# Patient Record
Sex: FEMALE | Race: WHITE | NOT HISPANIC OR LATINO | Employment: OTHER | URBAN - METROPOLITAN AREA
[De-identification: names, ages, dates, MRNs, and addresses within clinical notes are randomized per-mention and may not be internally consistent; named-entity substitution may affect disease eponyms.]

---

## 2017-01-25 ENCOUNTER — OFFICE VISIT (OUTPATIENT)
Dept: NEPHROLOGY | Facility: MEDICAL CENTER | Age: 62
End: 2017-01-25
Payer: COMMERCIAL

## 2017-01-25 VITALS
WEIGHT: 219 LBS | TEMPERATURE: 98.4 F | RESPIRATION RATE: 14 BRPM | DIASTOLIC BLOOD PRESSURE: 80 MMHG | HEART RATE: 84 BPM | SYSTOLIC BLOOD PRESSURE: 126 MMHG | HEIGHT: 66 IN | BODY MASS INDEX: 35.2 KG/M2

## 2017-01-25 DIAGNOSIS — I10 ESSENTIAL HYPERTENSION: ICD-10-CM

## 2017-01-25 DIAGNOSIS — N28.1 RENAL CYST: ICD-10-CM

## 2017-01-25 DIAGNOSIS — N18.1 CKD (CHRONIC KIDNEY DISEASE), STAGE I: ICD-10-CM

## 2017-01-25 PROCEDURE — 99213 OFFICE O/P EST LOW 20 MIN: CPT | Performed by: INTERNAL MEDICINE

## 2017-01-25 NOTE — PROGRESS NOTES
"Subjective:      Jodi Lopez is a 61 y.o. female who presents with Follow-Up and Chronic Kidney Disease       Jodi is coming today for f/u of right kidney cyst  Last time seen here 3 years ago.  No complaints  No dysuria/hematuria.  BP well controlled.  Serum creatinine level stable -WNL  Review of Systems   Constitutional: Negative for fever, chills, weight loss and malaise/fatigue.   HENT: Negative for congestion, sore throat and neck pain.    Eyes: Negative for blurred vision, double vision and pain.   Respiratory: Negative for cough, sputum production and wheezing.    Cardiovascular: Negative for chest pain, palpitations and leg swelling.   Gastrointestinal: Negative for nausea, vomiting, abdominal pain and diarrhea.   Genitourinary: Positive for frequency. Negative for dysuria, urgency and hematuria.   Musculoskeletal: Positive for back pain. Negative for myalgias.   Neurological: Negative for dizziness, sensory change, focal weakness and headaches.          Objective:     /80 mmHg  Pulse 84  Temp(Src) 36.9 °C (98.4 °F) (Temporal)  Resp 14  Ht 1.676 m (5' 6\")  Wt 99.338 kg (219 lb)  BMI 35.36 kg/m2     Physical Exam   Constitutional: She is oriented to person, place, and time. She appears well-developed and well-nourished. No distress.   HENT:   Head: Normocephalic and atraumatic.   Nose: Nose normal.   Mouth/Throat: Oropharynx is clear and moist.   Eyes: Conjunctivae normal and EOM are normal. Pupils are equal, round, and reactive to light. No scleral icterus.   Neck: Normal range of motion. Neck supple. No thyromegaly present.   Cardiovascular: Normal rate and regular rhythm.  Exam reveals no gallop and no friction rub.    Pulmonary/Chest: No respiratory distress. She has no wheezes. She has no rales.   Abdominal: Soft. Bowel sounds are normal. She exhibits no distension. There is no tenderness.   Musculoskeletal: Normal range of motion. She exhibits no edema and no tenderness. "   Lymphadenopathy:     She has no cervical adenopathy.   Neurological: She is alert and oriented to person, place, and time. No cranial nerve deficit.   Skin: Skin is warm. No rash noted. She is not diaphoretic. No erythema.   Renal US results reviewed , d/w patient.  Lab Results   Component Value Date/Time    CREATININE 0.82 11/27/2012 12:00 AM    POTASSIUM 3.9 11/27/2012 12:00 AM       Lab Results   Component Value Date/Time    CREATININE 0.67 08/03/2016 02:43 PM    POTASSIUM 3.7 08/03/2016 02:43 PM                 Assessment:     1.CKD I -stable  2.HTN: BP well controlled Pt advised to monitor BP at home  3.Electrolytes: well controlled.  4.Anemia: Hb stable WNL  5.Right renal complex cyst -slightly bigger in size per US      .    Plan:     See medications and orders placed in encounter report.  To monitor renal cyst , f/u in Urology Clinic

## 2017-01-25 NOTE — MR AVS SNAPSHOT
"        Jodi Lopez   2017 1:15 PM   Office Visit   MRN: [id number]    Department:  [facility]   Dept Phone:  [phone]    Description:  Female : 1955   Provider:  Jaci Vazquez M.D.           Reason for Visit     Follow-Up           Allergies as of 2017     Allergen Noted Reactions    Ammonia 2016       woozy     Erythromycin 2012   Rash    Food 2016   Shortness of Breath    Blue cheese feel weird  Jalapeno    Lipitor [Atorvastatin Calcium] 2013   Rash    Lisinopril 2015   Rash    rash    Other Misc 2016   Rash    OTC herbal \"Thuja\"    Penicillins 2012       Tongue swelling    Tape 2016   Rash, Itching    Rash at site (usually sensitive areas of skin)  Tape, EKG pads    Vancomycin 2012       Unsure of reaction      Vital Signs     Blood Pressure Pulse Temperature Respirations Height Weight    126/80 mmHg 84 36.9 °C (98.4 °F) (Temporal) 14 1.676 m (5' 6\") 99.338 kg (219 lb)    Body Mass Index Smoking Status                35.36 kg/m2 Passive Smoke Exposure - Never Smoker          Basic Information     Date Of Birth Sex Race Ethnicity Preferred Language    1955 Female White Non- English      Problem List              ICD-10-CM Priority Class Noted - Resolved    Left knee injury S89.92XA   2012 - Present    Internal derangement of knee    2012 - Present    Renal cyst N28.1   2013 - Present    CKD (chronic kidney disease), stage I N18.1   2013 - Present    HTN (hypertension) I10   2013 - Present    Varicose vein I86.8   2015 - Present    Hemorrhoids K64.9   2016 - Present      Health Maintenance        Date Due Completion Dates    IMM DTaP/Tdap/Td Vaccine (1 - Tdap) 1973 ---    PAP SMEAR 1975 ---    COLONOSCOPY 2004 ---    MAMMOGRAM 3/6/2007 3/6/2006, 3/1/2005    IMM ZOSTER VACCINE 2014 ---    IMM INFLUENZA (1) 2016 ---            Current Immunizations     No " "immunizations on file.      Below and/or attached are the medications your provider expects you to take. Review all of your home medications and newly ordered medications with your provider and/or pharmacist. Follow medication instructions as directed by your provider and/or pharmacist. Please keep your medication list with you and share with your provider. Update the information when medications are discontinued, doses are changed, or new medications (including over-the-counter products) are added; and carry medication information at all times in the event of emergency situations     Allergies:  AMMONIA - (reactions not documented)     ERYTHROMYCIN - Rash     FOOD - Shortness of Breath     LIPITOR - Rash     LISINOPRIL - Rash     OTHER MISC - Rash     PENICILLINS - (reactions not documented)     TAPE - Rash,Itching     VANCOMYCIN - (reactions not documented)               Medications  Valid as of: January 25, 2017 -  1:22 PM    Generic Name Brand Name Tablet Size Instructions for use    Aspirin (Tab) aspirin 81 MG Take 81 mg by mouth 1 time daily as needed.        Biotin (Cap) Biotin 5000 MCG Take 1 Cap by mouth every day.        Calcium-Magnesium-Vitamin D   Take 1 Tab by mouth as needed.        Cholecalciferol (Tab) cholecalciferol 1000 UNIT Take 1,000-2,000 Units by mouth every day.        Diclofenac Sodium (Gel) Diclofenac Sodium 1 % Apply 1 Application to skin as directed as needed.        Glucosamine HCl-MSM   Take 1 Tab by mouth as needed.        Magnesium (Tab) Magnesium 400 MG Take 1 Tab by mouth as needed.        Multiple Vitamin   Take 1 Tab by mouth every day.        NON SPECIFIED   Take 1 Tab by mouth every day. OTC  \"Nutri-Calm\"        Turmeric   Take 1 Tab by mouth every day.        .                 Medicines prescribed today were sent to:     [id number] - [place] - [address] [address]    Phone: [phone] Fax: [phone]    Open 24 Hours?: No      Medication " refill instructions:       If your prescription bottle indicates you have medication refills left, it is not necessary to call your provider’s office. Please contact your pharmacy and they will refill your medication.    If your prescription bottle indicates you do not have any refills left, you may request refills at any time through one of the following ways: The online [employer] system (except Urgent Care), by calling your provider’s office, or by asking your pharmacy to contact your provider’s office with a refill request. Medication refills are processed only during regular business hours and may not be available until the next business day. Your provider may request additional information or to have a follow-up visit with you prior to refilling your medication.   *Please Note: Medication refills are assigned a new Rx number when refilled electronically. Your pharmacy may indicate that no refills were authorized even though a new prescription for the same medication is available at the pharmacy. Please request the medicine by name with the pharmacy before contacting your provider for a refill.           Smarter Remarketer Access Code: Activation code not generated  Current Smarter Remarketer Status: Active

## 2017-03-23 ENCOUNTER — OCCUPATIONAL MEDICINE (OUTPATIENT)
Dept: URGENT CARE | Facility: CLINIC | Age: 62
End: 2017-03-23
Payer: COMMERCIAL

## 2017-03-23 VITALS
HEART RATE: 71 BPM | WEIGHT: 219 LBS | SYSTOLIC BLOOD PRESSURE: 160 MMHG | TEMPERATURE: 98.6 F | BODY MASS INDEX: 35.2 KG/M2 | HEIGHT: 66 IN | OXYGEN SATURATION: 99 % | RESPIRATION RATE: 16 BRPM | DIASTOLIC BLOOD PRESSURE: 80 MMHG

## 2017-03-23 DIAGNOSIS — S46.211A BICEPS STRAIN, RIGHT, INITIAL ENCOUNTER: ICD-10-CM

## 2017-03-23 PROCEDURE — 99214 OFFICE O/P EST MOD 30 MIN: CPT | Performed by: NURSE PRACTITIONER

## 2017-03-23 RX ORDER — NAPROXEN 500 MG/1
500 TABLET ORAL 2 TIMES DAILY WITH MEALS
Qty: 10 TAB | Refills: 0 | Status: SHIPPED | OUTPATIENT
Start: 2017-03-23 | End: 2017-03-27

## 2017-03-23 NOTE — Clinical Note
"EMPLOYEE’S CLAIM FOR COMPENSATION/ REPORT OF INITIAL TREATMENT  FORM C-4    EMPLOYEE’S CLAIM - PROVIDE ALL INFORMATION REQUESTED   First Name  Jodi Last Name  John Birthdate                    1955                Sex  female Claim Number   Home Address  [address] Age  61 y.o. Height  1.676 m (5' 5.98\") Weight  99.338 kg (219 lb) [facility]     [place] Zip  [address] Telephone  [phone] (home)    Mailing Address  [address] [place] Zip  [address] Primary Language Spoken  English    Insurer   Third Party   Kiki   Employee's Occupation (Job Title) When Injury or Occupational Disease Occurred       Employer's Name    [employer] Telephone      Employer Address   [address] [place] Zip   [address]   Date of Injury  2/23/2017               Hour of Injury  9:00 AM Date Employer Notified  2/23/2017 Last Day of Work after Injury or Occupational Disease  3/23/2017 Supervisor to Whom Injury Reported  Jodi Lopez    Address or Location of Accident (if applicable)  [[address] [place] [address] ]   What were you doing at the time of accident? (if applicable)  Walking across driveway     How did this injury or occupational disease occur? (Be specific an answer in detail. Use additional sheet if necessary)  Slipped on ice as I was walking from my car to the house    If you believe that you have an occupational disease, when did you first have knowledge of the disability and it relationship to your employment?  NA Witnesses to the Accident  Jayjay Villatoro      Nature of Injury or Occupational Disease  Sprain  Part(s) of Body Injured or Affected  Lower Arm (L), Upper Arm (L), N/A    I certify that the above is true and correct to the best of my knowledge and that I have provided this information in order to obtain the benefits of [place] " [place]).  I hereby authorize any physician, chiropractor, surgeon, practitioner, or other person, any hospital, including [facility] or [facility] hospital, any medical service organization, any insurance company, or other institution or organization to release to each other, any medical or other information, including benefits paid or payable, pertinent to this injury or disease, except information relative to diagnosis, treatment and/or counseling for AIDS, psychological conditions, alcohol or controlled substances, for which I must give specific authorization.  A Photostat of this authorization shall be as valid as the original.     Date   Place   Employee’s Signature   THIS REPORT MUST BE COMPLETED AND MAILED WITHIN 3 WORKING DAYS OF TREATMENT   Place  [facility]  Name of [facility]   Date  3/23/2017 Diagnosis  No diagnosis found. Is there evidence the injured employee was under the influence of alcohol and/or another controlled substance at the time of accident?   Hour  2:38 PM Description of Injury or Disease  There were no encounter diagnoses. No   Treatment  Naprosyn as prescribed prn pain.    Have you advised the patient to remain off work five days or more? No   X-Ray Findings      If Yes   From Date  To Date      From information given by the employee, together with medical evidence, can you directly connect this injury or occupational disease as job incurred?    Comments:?  While fall did not occur while performing job duty, she was carrying supplies in to work, which may have impeded her ability to see ice hazard or maintain balance.   If No Full Duty  No Modified Duty      Is additional medical care by a physician indicated?  Yes  Comments:Follow up in 5 days. If Modified Duty, Specify any Limitations / Restrictions  See work restrictions per D-39: limit pushing/pulling/lifting and  "carrying to 10 pounds; limit reaching above the right shoulder to less than 1 hour per day.  Restrictions apply to right arm only.   Do you know of any previous injury or disease contributing to this condition or occupational disease?                            No   Date  3/23/2017 Print Doctor’s Name ROGERIO Park certify the employer’s copy of  this form was mailed on:   Address  [address] Insurer’s Use Only     [place] Zip  [address]    Provider’s Tax ID Number  [id number]  Telephone  Dept: [phone]        clyde-MILLIE [place]   e-Signature: Dr. Juvenal Johnson, Medical Director Degree  APRN        ORIGINAL-TREATING PHYSICIAN OR CHIROPRACTOR    PAGE 2-INSURER/TPA    PAGE 3-EMPLOYER    PAGE 4-EMPLOYEE             Form C-4 (rev10/07)              BRIEF DESCRIPTION OF RIGHTS AND BENEFITS  (Pursuant to [place]RS 616C.050)    Notice of Injury or Occupational Disease (Incident Report Form C-1): If an injury or occupational disease (OD) arises out of and in the  course of employment, you must provide written notice to your employer as soon as practicable, but no later than 7 days after the accident or  OD. Your employer shall maintain a sufficient supply of the required forms.    Claim for Compensation (Form C-4): If medical treatment is sought, the form C-4 is available at the place of initial treatment. A completed  \"Claim for Compensation\" (Form C-4) must be filed within 90 days after an accident or OD. The treating physician or chiropractor must,  within 3 working days after treatment, complete and mail to the employer, the employer's insurer and third-party , the Claim for  Compensation.    Medical Treatment: If you require medical treatment for your on-the-job injury or OD, you may be required to select a physician or  chiropractor from a list provided by your workers’ compensation insurer, if it has contracted with an Organization for Managed Care (O) " or  Preferred Provider Organization (PPO) or providers of health care. If your employer has not entered into a contract with an MCO or PPO, you  may select a physician or chiropractor from the Panel of Physicians and Chiropractors. Any medical costs related to your industrial injury or  OD will be paid by your insurer.    Temporary Total Disability (TTD): If your doctor has certified that you are unable to work for a period of at least 5 consecutive days, or 5  cumulative days in a 20-day period, or places restrictions on you that your employer does not accommodate, you may be entitled to TTD  compensation.    Temporary Partial Disability (TPD): If the wage you receive upon reemployment is less than the compensation for TTD to which you are  entitled, the insurer may be required to pay you TPD compensation to make up the difference. TPD can only be paid for a maximum of 24  months.    Permanent Partial Disability (PPD): When your medical condition is stable and there is an indication of a PPD as a result of your injury or  OD, within 30 days, your insurer must arrange for an evaluation by a rating physician or chiropractor to determine the degree of your PPD. The  amount of your PPD award depends on the date of injury, the results of the PPD evaluation and your age and wage.    Permanent Total Disability (PTD): If you are medically certified by a treating physician or chiropractor as permanently and totally disabled  and have been granted a PTD status by your insurer, you are entitled to receive monthly benefits not to exceed 66 2/3% of your average  monthly wage. The amount of your PTD payments is subject to reduction if you previously received a PPD award.    Vocational Rehabilitation Services: You may be eligible for vocational rehabilitation services if you are unable to return to the job due to a  permanent physical impairment or permanent restrictions as a result of your injury or occupational  disease.    Transportation and Per Jewels Reimbursement: You may be eligible for travel expenses and per jewels associated with medical treatment.    Reopening: You may be able to reopen your claim if your condition worsens after claim closure.    Appeal Process: If you disagree with a written determination issued by the insurer or the insurer does not respond to your request, you may  appeal to the Department of Administration, , by following the instructions contained in your determination letter. You must  appeal the determination within 70 days from the date of the determination letter at [address], [address], [place], [place]  [address], or [address], [address], [place], [place] [address]. If you disagree with the  decision, you may appeal to the  Department of Administration, . You must file your appeal within 30 days from the date of the  decision  letter at [address], [address], [place], [place] [address], or [address], [place], [place] [address]. If you  disagree with a decision of an , you may file a petition for judicial review with the District Court. You must do so within 30  days of the Appeal Officer’s decision. You may be represented by an  at your own expense or you may contact the [facility] for possible  representation.    [place]  for Injured Workers (NAIW): If you disagree with a  decision, you may request that NAIW represent you  without charge at an  Hearing. For information regarding denial of benefits, you may contact the [facility] at: [address], [address], [place] [address], [phone], or [address] [facility] [address], [place] [address], [phone]    To File a Complaint with the Division: If you wish to file a complaint with the  of the Division of Industrial Relations (DIR),  please contact  the Workers’ Compensation Section, [address], [address], [place], [place] [address], telephone ([phone]) [phone], or  [address], [address], [place], [place] [address], telephone [phone]) [phone].    For assistance with Workers’ Compensation Issues: you may contact the Office of the [facility] Consumer Health Assistance, [address] [place] [address], [address], [place], [place] [address], Toll Free 1-210.548.6788, Web site: http://govcha.[facility].nv., E-mail  Trina@[facility].nv.                                                                                                                                                                                                                                   __________________________________________________________________                                                                   _________________                Employee Name / Signature                                                                                                                                                       Date                                                                                                                                                                                                     D-2 (rev. 10/07)

## 2017-03-23 NOTE — Clinical Note
[facility] [place] [id number] [address]  Phone: [phone] - Fax: [phone]        [facility] Progress Report and Disability Certifica @ 1:20PMtion  Date of Service: 3/23/2017   No Show:  No  Date / Time of Next Visit: 3/27/2017   Claim Information   Patient Name: Jodi Lopez  Claim Number:     Employer:   [employer] Date of Injury: 2/23/2017     Insurer / TPA: Kiki  ID / SSN:     Occupation:    Diagnosis: There were no encounter diagnoses.    Medical Information   Related to Industrial Injury?   Comments:Not injured while performing job duty, but carrying of work supplies may have impeded patient's ability to visualize ice risk or keep her balance as she slipped.    Subjective Complaints:  DOI 2/23/17  Patient works as a .  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  She fell to the side, sustaining a blow to the right elbow and upper arm.  She noted pain at the time of fall.  While she has had improvement in pain, she reports a persistent aching pain in the right biceps region, worse with movement.  She has been taking NSAIDs at OTC dose intermittently with minimal relief.  She also had some left over Voltaren gel that she has applied with minimal relief.  She denies any numbness, tingling, or weakness.  She denies any prior injury.  She denies any second job or recreational activity as contributing factor.     Objective Findings: Patient is alert, oriented, and in no acute distress.  Heart RRR, S1, S2.  Lungs clear to auscultation.  Right arm is warm, dry, and intact with no bruising, swelling, erythema, rash or lesion.  No shoulder joint pain on palpation.  No clavicle or scapula pain.  No palpable muscle spasms.  Right Biceps is tender to palpation.  No elbow joint tenderness.  No forearm, wrist, or hand tenderness.   Shoulder ROM intact.  Elbow ROM intact but certain movements increase biceps pain.  Pronation/supination against opposition aggravates biceps pain.  Radial pulse brisk.  Cap refill < 2 seconds.   Pre-Existing Condition(s):     Assessment:   Initial Visit    Status: Additional Care Required  Comments:Follow up in 5 days.  Permanent Disability:     Plan: Medication  Comments:Naprosyn as prescribed.    Diagnostics:      Comments:       Disability Information   Status: Released to Restricted Duty    From:  3/23/2017  Through: 3/27/2017 Restrictions are: Temporary   Physical Restrictions   Sitting:    Standing:    Stooping:    Bending:      Squatting:    Walking:    Climbing:    Pushing:    Comments:Less than 10 pounds   Pulling:    Comments:Less than 10 pounds Other:    Reaching Above Shoulder (L):   Reaching Above Shoulder (R): < or = 1 hrs/day     Reaching Below Shoulder (L):    Reaching Below Shoulder (R):      Not to exceed Weight Limits   Carrying(hrs):   Weight Limit(lb): < or = to 10 pounds Lifting(hrs):   Weight  Limit(lb): < or = to 10 pounds   Comments: Restrictions apply to right arm only.    Repetitive Actions   Hands: i.e. Fine Manipulations from Grasping:     Feet: i.e. Operating Foot Controls:     Driving / Operate Machinery:     Physician Name: ROGERIO Park Physician Signature: MILLIE Daly e-Signature: Dr. Juvenal Johnson, Medical Director   Clinic Name / Location: [address] [address] Clinic Phone Number: Dept: [phone]   Appointment Time: 2:00 Pm Visit Start Time: 2:38 PM   Check-In Time:  2:20 Pm Visit Discharge Time:  3:22 PM   Original-Treating Physician or Chiropractor    Page 2-Insurer/TPA    Page 3-Employer    Page 4-Employee

## 2017-03-23 NOTE — MR AVS SNAPSHOT
"        Jodi Jonesrington   3/23/2017 2:00 PM   Occupational Medicine   MRN: [id number]    Department:  [facility]   Dept Phone:  [phone]    Description:  Female : 1955   Provider:  ROGERIO Park           Reason for Visit     Arm Pain right forearm pain s/p GLF x 1 month ago      Allergies as of 3/23/2017     Allergen Noted Reactions    Ammonia 2016       woozy     Erythromycin 2012   Rash    Food 2016   Shortness of Breath    Blue cheese feel weird  Jalapeno    Lipitor [Atorvastatin Calcium] 2013   Rash    Lisinopril 2015   Rash    rash    Other Misc 2016   Rash    OTC herbal \"Thuja\"    Penicillins 2012       Tongue swelling    Tape 2016   Rash, Itching    Rash at site (usually sensitive areas of skin)  Tape, EKG pads    Vancomycin 2012       Unsure of reaction      Vital Signs     Blood Pressure Pulse Temperature Respirations Height Weight    160/80 mmHg 71 37 °C (98.6 °F) 16 1.676 m (5' 5.98\") 99.338 kg (219 lb)    Body Mass Index Oxygen Saturation Breastfeeding? Smoking Status          35.36 kg/m2 99% No Passive Smoke Exposure - Never Smoker        Basic Information     Date Of Birth Sex Race Ethnicity Preferred Language    1955 Female White Non- English      Problem List              ICD-10-CM Priority Class Noted - Resolved    Left knee injury S89.92XA   2012 - Present    Internal derangement of knee    2012 - Present    Renal cyst N28.1   2013 - Present    CKD (chronic kidney disease), stage I N18.1   2013 - Present    HTN (hypertension) I10   2013 - Present    Varicose vein I86.8   2015 - Present    Hemorrhoids K64.9   2016 - Present      Health Maintenance        Date Due Completion Dates    IMM DTaP/Tdap/Td Vaccine (1 - Tdap) 1973 ---    PAP SMEAR 1975 ---    COLONOSCOPY 2004 ---    MAMMOGRAM 3/6/2007 3/6/2006, 3/1/2005    IMM ZOSTER VACCINE 2014 ---   " "IMM INFLUENZA (1) 9/1/2016 ---            Current Immunizations     No immunizations on file.      Below and/or attached are the medications your provider expects you to take. Review all of your home medications and newly ordered medications with your provider and/or pharmacist. Follow medication instructions as directed by your provider and/or pharmacist. Please keep your medication list with you and share with your provider. Update the information when medications are discontinued, doses are changed, or new medications (including over-the-counter products) are added; and carry medication information at all times in the event of emergency situations     Allergies:  AMMONIA - (reactions not documented)     ERYTHROMYCIN - Rash     FOOD - Shortness of Breath     LIPITOR - Rash     LISINOPRIL - Rash     OTHER MISC - Rash     PENICILLINS - (reactions not documented)     TAPE - Rash,Itching     VANCOMYCIN - (reactions not documented)               Medications  Valid as of: March 23, 2017 -  3:25 PM    Generic Name Brand Name Tablet Size Instructions for use    Aspirin (Tab) aspirin 81 MG Take 81 mg by mouth 1 time daily as needed.        Biotin (Cap) Biotin 5000 MCG Take 1 Cap by mouth every day.        Calcium-Magnesium-Vitamin D   Take 1 Tab by mouth as needed.        Cholecalciferol (Tab) cholecalciferol 1000 UNIT Take 1,000-2,000 Units by mouth every day.        Diclofenac Sodium (Gel) Diclofenac Sodium 1 % Apply 1 Application to skin as directed as needed.        Glucosamine HCl-MSM   Take 1 Tab by mouth as needed.        Magnesium (Tab) Magnesium 400 MG Take 1 Tab by mouth as needed.        Multiple Vitamin   Take 1 Tab by mouth every day.        NON SPECIFIED   Take 1 Tab by mouth every day. OTC  \"Nutri-Calm\"        Turmeric   Take 1 Tab by mouth every day.        .                 Medicines prescribed today were sent to:     [id number] - [place], [place] - [address]    [address] [place] [address]    Phone: " [phone] Fax: [phone]    Open 24 Hours?: No      Medication refill instructions:       If your prescription bottle indicates you have medication refills left, it is not necessary to call your provider’s office. Please contact your pharmacy and they will refill your medication.    If your prescription bottle indicates you do not have any refills left, you may request refills at any time through one of the following ways: The online Gone! system (except Urgent Care), by calling your provider’s office, or by asking your pharmacy to contact your provider’s office with a refill request. Medication refills are processed only during regular business hours and may not be available until the next business day. Your provider may request additional information or to have a follow-up visit with you prior to refilling your medication.   *Please Note: Medication refills are assigned a new Rx number when refilled electronically. Your pharmacy may indicate that no refills were authorized even though a new prescription for the same medication is available at the pharmacy. Please request the medicine by name with the pharmacy before contacting your provider for a refill.           Gone! Access Code: Activation code not generated  Current Gone! Status: Active

## 2017-03-24 ASSESSMENT — ENCOUNTER SYMPTOMS
CHILLS: 0
TINGLING: 0
SENSORY CHANGE: 0
WEAKNESS: 0
FALLS: 1
DIAPHORESIS: 0
FOCAL WEAKNESS: 0
FEVER: 0

## 2017-03-24 NOTE — PROGRESS NOTES
"Subjective:      Jodi Lopez is a 61 y.o. female who presents with Arm Pain      DOI 2/23/17  Patient works as a .  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  She fell to the side, sustaining a blow to the right elbow and upper arm.  She noted pain at the time of fall.  While she has had improvement in pain, she reports a persistent aching pain in the right biceps region, worse with movement.  She has been taking NSAIDs at OTC dose intermittently with minimal relief.  She also had some left over Voltaren gel that she has applied with minimal relief.  She denies any numbness, tingling, or weakness.  She denies any prior injury.  She denies any second job or recreational activity as contributing factor.       Arm Pain   Pertinent negatives include no tingling.       Review of Systems   Constitutional: Negative for fever, chills, malaise/fatigue and diaphoresis.   Musculoskeletal: Positive for falls. Negative for joint pain.   Neurological: Negative for tingling, sensory change, focal weakness and weakness.     Medications, Allergies, and current problem list reviewed today in Epic     Objective:     /80 mmHg  Pulse 71  Temp(Src) 37 °C (98.6 °F)  Resp 16  Ht 1.676 m (5' 5.98\")  Wt 99.338 kg (219 lb)  BMI 35.36 kg/m2  SpO2 99%  Breastfeeding? No     Physical Exam    Patient is alert, oriented, and in no acute distress.  Heart RRR, S1, S2.  Lungs clear to auscultation.  Right arm is warm, dry, and intact with no bruising, swelling, erythema, rash or lesion.  No shoulder joint pain on palpation.  No clavicle or scapula pain.  No palpable muscle spasms.  Right Biceps is tender to palpation.  No elbow joint tenderness.  No forearm, wrist, or hand tenderness.  Shoulder ROM intact.  Elbow ROM intact but certain movements increase biceps pain.  Pronation/supination against opposition aggravates biceps pain.  " Radial pulse brisk.  Cap refill < 2 seconds.       Assessment/Plan:     1. Biceps strain, right, initial encounter  - naproxen (NAPROSYN) 500 MG Tab; Take 1 Tab by mouth 2 times a day, with meals for 5 days.  Dispense: 10 Tab; Refill: 0    Discussed exam findings with patient.  Naprosyn as prescribed.  Work restrictions per D-39.  Follow up in 5 days for further evaluation and care.

## 2017-03-27 ENCOUNTER — OCCUPATIONAL MEDICINE (OUTPATIENT)
Dept: OCCUPATIONAL MEDICINE | Facility: CLINIC | Age: 62
End: 2017-03-27
Payer: COMMERCIAL

## 2017-03-27 VITALS
SYSTOLIC BLOOD PRESSURE: 128 MMHG | WEIGHT: 220 LBS | BODY MASS INDEX: 35.36 KG/M2 | HEART RATE: 78 BPM | TEMPERATURE: 98.9 F | HEIGHT: 66 IN | RESPIRATION RATE: 16 BRPM | OXYGEN SATURATION: 94 % | DIASTOLIC BLOOD PRESSURE: 72 MMHG

## 2017-03-27 DIAGNOSIS — S50.01XD CONTUSION OF RIGHT ELBOW, SUBSEQUENT ENCOUNTER: ICD-10-CM

## 2017-03-27 DIAGNOSIS — S46.211D STRAIN OF RIGHT BICEPS, SUBSEQUENT ENCOUNTER: ICD-10-CM

## 2017-03-27 PROCEDURE — 99214 OFFICE O/P EST MOD 30 MIN: CPT | Performed by: PREVENTIVE MEDICINE

## 2017-03-27 RX ORDER — NAPROXEN 500 MG/1
500 TABLET ORAL 2 TIMES DAILY WITH MEALS
Qty: 60 TAB | Refills: 0 | Status: SHIPPED | OUTPATIENT
Start: 2017-03-27 | End: 2018-08-17

## 2017-03-27 ASSESSMENT — ENCOUNTER SYMPTOMS
GASTROINTESTINAL NEGATIVE: 1
NEUROLOGICAL NEGATIVE: 1

## 2017-03-27 ASSESSMENT — PAIN SCALES - GENERAL: PAINLEVEL: 7=MODERATE-SEVERE PAIN

## 2017-03-27 NOTE — Clinical Note
[address],   [address] [place] [address]  Phone: [phone] - Fax: [phone]        [facility] Progress Report and Disability Certification  Date of Service: 3/27/2017   No Show:  No  Date / Time of Next Visit: 4/11/2017   Claim Information   Patient Name: Jodi Lopez  Claim Number:     Employer:   *** Date of Injury: 2/23/2017     Insurer / TPA: Kiki *** ID / SSN:     Occupation:   *** Diagnosis: There were no encounter diagnoses.    Medical Information   Related to Industrial Injury?   Comments:indeterminate ***   Subjective Complaints:  DOI 2/23/17  Patient works as a .  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  She fell to the side, sustaining a blow to the right elbow. Patient now does not have pain at the elbow but has pain on the bicep since the injury. She states the pain is in the body of the bicep and then has minimal pain at the  forearm. She states the pain is worse with lifting and flexing. Denies any numbness or tingling or radiating pain. She has an taking naproxen for relief which has been helping.    Objective Findings: Right arm: No bruising or gross deformity. Moderate tenderness over body of bicep. Minimal tenderness over the musculature of her arm. No defect in the biceps tendon felt. Full range of motion with pain with arm flexion. Strength intact but pain with resisted flexion.   Pre-Existing Condition(s):     Assessment:   Condition Same    Status: Additional Care Required  Permanent Disability:No    Plan:      Diagnostics:      Comments:  Referral physical therapy  Prescribed more naproxen  Restricted duty  Follow-up 2 weeks    Disability Information   Status: Released to Restricted Duty    From:  3/27/2017  Through: 4/11/2017 Restrictions are: Temporary   Physical Restrictions   Sitting:   Standing:    Stooping:    Bending:      Squatting:    Walking:    Climbing:    Pushing:      Pulling:    Other:    Reaching Above Shoulder (L):   Reaching Above Shoulder (R):       Reaching Below Shoulder (L):    Reaching Below Shoulder (R):      Not to exceed Weight Limits   Carrying(hrs):   Weight Limit(lb):   Lifting(hrs):   Weight  Limit(lb):     Comments: Limit right arm to less than 10 lbs push/pull/lift    Repetitive Actions   Hands: i.e. Fine Manipulations from Grasping:     Feet: i.e. Operating Foot Controls:     Driving / Operate Machinery:     Physician Name: Kaiden Yeh D.O. Physician Signature: KAIDEN French D.O. e-Signature: Dr. Juvenal Johnson, Medical Director   Clinic Name / Location: [address],   [address] [address] Clinic Phone Number: Dept: [phone]   Appointment Time: 1:20 Pm Visit Start Time: 1:15 PM   Check-In Time:  1:07 Pm Visit Discharge Time:  ***   Original-Treating Physician or Chiropractor    Page 2-Insurer/TPA    Page 3-Employer    Page 4-Employee

## 2017-03-27 NOTE — Clinical Note
"EMPLOYEE’S CLAIM FOR COMPENSATION/ REPORT OF INITIAL TREATMENT  FORM C-4    EMPLOYEE’S CLAIM - PROVIDE ALL INFORMATION REQUESTED   First Name  Jodi Last Name  John Birthdate                    1955                Sex  female Claim Number   Home Address  [address] Age  61 y.o. Height  1.676 m (5' 6\") Weight  99.791 kg (220 lb) [facility]     [facility] [place] [facility] Zip  [address] Telephone  [phone] (home)    Mailing Address  [address] [facility] Zip  [address] Primary Language Spoken  English    Insurer  unknown Third Party   [place]   Employee's Occupation (Job Title) When Injury or Occupational Disease Occurred       Employer's Name    [employer] Telephone   [phone]   Employer Address   [address] [facility] Zip   [address]   Date of Injury  2/23/2017               Hour of Injury  9:00 AM Date Employer Notified  2/23/2017 Last Day of Work after Injury or Occupational Disease  3/23/2017 Supervisor to Whom Injury Reported  Jodi Lopez    Address or Location of Accident (if applicable)  [[address] ]   What were you doing at the time of accident? (if applicable)  Walking across driveway     How did this injury or occupational disease occur? (Be specific an answer in detail. Use additional sheet if necessary)  Slipped on ice as I was walking from my car to the house    If you believe that you have an occupational disease, when did you first have knowledge of the disability and it relationship to your employment?  NA Witnesses to the Accident  Jayjay Villatoro      Nature of Injury or Occupational Disease  Sprain  Part(s) of Body Injured or Affected  Lower Arm (R), Upper Arm (R), N/A    I certify that the above is true and correct to the best of my knowledge and that I have provided this information in order to obtain the benefits of " [place]  I hereby authorize any physician, chiropractor, surgeon, practitioner, or other person, any hospital, including [facility] or [facility], any medical service organization, any insurance company, or other institution or organization to release to each other, any medical or other information, including benefits paid or payable, pertinent to this injury or disease, except information relative to diagnosis, treatment and/or counseling for AIDS, psychological conditions, alcohol or controlled substances, for which I must give specific authorization.  A Photostat of this authorization shall be as valid as the original.     Date   Place   Employee’s Signature   THIS REPORT MUST BE COMPLETED AND MAILED WITHIN 3 WORKING DAYS OF TREATMENT   Place  [facility] [place] [place]  Name of [facility]   Date  3/27/2017 Diagnosis  (S46.111D) Strain of right biceps, subsequent encounter  (S50.01XD) Contusion of right elbow, subsequent encounter Is there evidence the injured employee was under the influence of alcohol and/or another controlled substance at the time of accident?   Hour  1:15 PM Description of Injury or Disease  Diagnoses of Strain of right biceps, subsequent encounter and Contusion of right elbow, subsequent encounter were pertinent to this visit. No   Treatment  Naprosyn as prescribed prn pain.   Have you advised the patient to remain off work five days or more? No   X-Ray Findings      If Yes   From Date  To Date      From information given by the employee, together with medical evidence, can you directly connect this injury or occupational disease as job incurred?    Comments:While fall did not occur while performing job duty, she was carrying supplies in to work, which may have impeded her ability to see ice hazard or maintain balance.    If No Full Duty  No Modified  "Duty  Yes   Is additional medical care by a physician indicated?  Yes If Modified Duty, Specify any Limitations / Restrictions      Do you know of any previous injury or disease contributing to this condition or occupational disease?                            No   Date  4/5/2017 Print Doctor’s Name Kaiden Yeh D.O. I certify the employer’s copy of  this form was mailed on:   Address  [address] [place] [address],   [address] Insurer’s Use Only     [facility]  [address]    Provider’s Tax ID Number  [id number]  Telephone  Dept: [phone]        e-SignTAYLOR, KAIDEN DELACRUZ D.O.   e-Signature: Dr. Juvenal Johnson, Medical Director Degree  DO        ORIGINAL-TREATING PHYSICIAN OR CHIROPRACTOR    PAGE 2-INSURER/TPA    PAGE 3-EMPLOYER    PAGE 4-EMPLOYEE             Form C-4 (rev10/07)              BRIEF DESCRIPTION OF RIGHTS AND BENEFITS  (Pursuant to [place]RS 616C.050)    Notice of Injury or Occupational Disease (Incident Report Form C-1): If an injury or occupational disease (OD) arises out of and in the  course of employment, you must provide written notice to your employer as soon as practicable, but no later than 7 days after the accident or  OD. Your employer shall maintain a sufficient supply of the required forms.    Claim for Compensation (Form C-4): If medical treatment is sought, the form C-4 is available at the place of initial treatment. A completed  \"Claim for Compensation\" (Form C-4) must be filed within 90 days after an accident or OD. The treating physician or chiropractor must,  within 3 working days after treatment, complete and mail to the employer, the employer's insurer and third-party , the Claim for  Compensation.    Medical Treatment: If you require medical treatment for your on-the-job injury or OD, you may be required to select a physician or  chiropractor from a list provided by your workers’ compensation insurer, if it has contracted with an Organization for Managed Care (MCO) " or  Preferred Provider Organization (PPO) or providers of health care. If your employer has not entered into a contract with an MCO or PPO, you  may select a physician or chiropractor from the Panel of Physicians and Chiropractors. Any medical costs related to your industrial injury or  OD will be paid by your insurer.    Temporary Total Disability (TTD): If your doctor has certified that you are unable to work for a period of at least 5 consecutive days, or 5  cumulative days in a 20-day period, or places restrictions on you that your employer does not accommodate, you may be entitled to TTD  compensation.    Temporary Partial Disability (TPD): If the wage you receive upon reemployment is less than the compensation for TTD to which you are  entitled, the insurer may be required to pay you TPD compensation to make up the difference. TPD can only be paid for a maximum of 24  months.    Permanent Partial Disability (PPD): When your medical condition is stable and there is an indication of a PPD as a result of your injury or  OD, within 30 days, your insurer must arrange for an evaluation by a rating physician or chiropractor to determine the degree of your PPD. The  amount of your PPD award depends on the date of injury, the results of the PPD evaluation and your age and wage.    Permanent Total Disability (PTD): If you are medically certified by a treating physician or chiropractor as permanently and totally disabled  and have been granted a PTD status by your insurer, you are entitled to receive monthly benefits not to exceed 66 2/3% of your average  monthly wage. The amount of your PTD payments is subject to reduction if you previously received a PPD award.    Vocational Rehabilitation Services: You may be eligible for vocational rehabilitation services if you are unable to return to the job due to a  permanent physical impairment or permanent restrictions as a result of your injury or occupational  disease.    Transportation and Per Jewels Reimbursement: You may be eligible for travel expenses and per jewels associated with medical treatment.    Reopening: You may be able to reopen your claim if your condition worsens after claim closure.    Appeal Process: If you disagree with a written determination issued by the insurer or the insurer does not respond to your request, you may  appeal to the Department of Administration, , by following the instructions contained in your determination letter. You must  appeal the determination within 70 days from the date of the determination letter at [address], [address], [place], [place]  [address], or [address], [address], [place], [place] [address]. If you disagree with the  decision, you may appeal to the  Department of Administration, . You must file your appeal within 30 days from the date of the  decision  letter at [address], [address], [place], [place] [address], or [address], [place], [place] [address]. If you  disagree with a decision of an , you may file a petition for judicial review with the District Court. You must do so within 30  days of the Appeal Officer’s decision. You may be represented by an  at your own expense or you may contact the [facility] for possible  representation.    [place]  for Injured Workers (NAIW): If you disagree with a  decision, you may request that NAIW represent you  without charge at an  Hearing. For information regarding denial of benefits, you may contact the [facility] at: [address], [address], [place] [address], [phone], or [address] [facility] [address], [place] [address], [phone]    To File a Complaint with the Division: If you wish to file a complaint with the  of the Division of Industrial Relations (DIR),  please contact  the Workers’ Compensation Section, [address], [address], [place], [place] [address], telephone ([phone]) [phone], or  [address], [address], [place], [place] [address], telephone [phone].    For assistance with Workers’ Compensation Issues: you may contact the Office of the [facility] Consumer Health Assistance, [address] [place] [address], [address], [place], [place] [address], Toll Free 1-934.160.2994, Web site: http://govcha.[facility].nv., E-mail  [email or web address].                                                                                                                                                                                                                                   __________________________________________________________________                                                                   _________________                Employee Name / Signature                                                                                                                                                       Date                                                                                                                                                                                                     D-2 (rev. 10/07)

## 2017-03-27 NOTE — MR AVS SNAPSHOT
"        Jodi Lopez   3/27/2017 1:20 PM   Occupational Medicine   MRN: [id number]    Department:  [facility]   Dept Phone:  [phone]    Description:  Female : 1955   Provider:  Kaiden Yeh D.O.           Reason for Visit     Work-Related Injury DOI 17 R arm same      Allergies as of 3/27/2017     Allergen Noted Reactions    Ammonia 2016       woozy     Erythromycin 2012   Rash    Food 2016   Shortness of Breath    Blue cheese feel weird  Jalapeno    Lipitor [Atorvastatin Calcium] 2013   Rash    Lisinopril 2015   Rash    rash    Other Misc 2016   Rash    OTC herbal \"Thuja\"    Penicillins 2012       Tongue swelling    Tape 2016   Rash, Itching    Rash at site (usually sensitive areas of skin)  Tape, EKG pads    Vancomycin 2012       Unsure of reaction      You were diagnosed with     Strain of right biceps, subsequent encounter   [1612990]       Contusion of right elbow, subsequent encounter   [[id number]]         Vital Signs     Blood Pressure Pulse Temperature Respirations Height Weight    128/72 mmHg 78 37.2 °C (98.9 °F) 16 1.676 m (5' 6\") 99.791 kg (220 lb)    Body Mass Index Oxygen Saturation Smoking Status             35.53 kg/m2 94% Passive Smoke Exposure - Never Smoker         Basic Information     Date Of Birth Sex Race Ethnicity Preferred Language    1955 Female White Non- English      Your appointments     2017  1:00 PM   Workers Compensation with Kaiden Yeh D.O.   [address] [address] [phone]              Problem List              ICD-10-CM Priority Class Noted - Resolved    Left knee injury S89.92XA   2012 - Present    Internal derangement of knee    2012 - Present    Renal cyst N28.1   2013 - Present    CKD (chronic kidney disease), stage I N18.1   2013 - Present    HTN (hypertension) I10   2013 - " Present    Varicose vein I86.8   6/5/2015 - Present    Hemorrhoids K64.9   8/5/2016 - Present      Health Maintenance        Date Due Completion Dates    IMM DTaP/Tdap/Td Vaccine (1 - Tdap) 4/28/1973 ---    PAP SMEAR 4/28/1975 ---    COLONOSCOPY 4/28/2004 ---    MAMMOGRAM 3/6/2007 3/6/2006, 3/1/2005    IMM ZOSTER VACCINE 4/28/2014 ---    IMM INFLUENZA (1) 9/1/2016 ---            Current Immunizations     No immunizations on file.      Below and/or attached are the medications your provider expects you to take. Review all of your home medications and newly ordered medications with your provider and/or pharmacist. Follow medication instructions as directed by your provider and/or pharmacist. Please keep your medication list with you and share with your provider. Update the information when medications are discontinued, doses are changed, or new medications (including over-the-counter products) are added; and carry medication information at all times in the event of emergency situations     Allergies:  AMMONIA - (reactions not documented)     ERYTHROMYCIN - Rash     FOOD - Shortness of Breath     LIPITOR - Rash     LISINOPRIL - Rash     OTHER MISC - Rash     PENICILLINS - (reactions not documented)     TAPE - Rash,Itching     VANCOMYCIN - (reactions not documented)               Medications  Valid as of: March 27, 2017 -  7:25 PM    Generic Name Brand Name Tablet Size Instructions for use    Aspirin (Tab) aspirin 81 MG Take 81 mg by mouth 1 time daily as needed.        Biotin (Cap) Biotin 5000 MCG Take 1 Cap by mouth every day.        Calcium-Magnesium-Vitamin D   Take 1 Tab by mouth as needed.        Cholecalciferol (Tab) cholecalciferol 1000 UNIT Take 1,000-2,000 Units by mouth every day.        Diclofenac Sodium (Gel) Diclofenac Sodium 1 % Apply 1 Application to skin as directed as needed.        Glucosamine HCl-MSM   Take 1 Tab by mouth as needed.        Magnesium (Tab) Magnesium 400 MG Take 1 Tab by mouth as  "needed.        Multiple Vitamin   Take 1 Tab by mouth every day.        Naproxen (Tab) NAPROSYN 500 MG Take 1 Tab by mouth 2 times a day, with meals.        NON SPECIFIED   Take 1 Tab by mouth every day. OTC  \"Nutri-Calm\"        Turmeric   Take 1 Tab by mouth every day.        .                 Medicines prescribed today were sent to:     [id number] - [place] - [address] [place] [address]    Phone: [phone] Fax: [phone]    Open 24 Hours?: No      Medication refill instructions:       If your prescription bottle indicates you have medication refills left, it is not necessary to call your provider’s office. Please contact your pharmacy and they will refill your medication.    If your prescription bottle indicates you do not have any refills left, you may request refills at any time through one of the following ways: The online ZenDay system (except Urgent Care), by calling your provider’s office, or by asking your pharmacy to contact your provider’s office with a refill request. Medication refills are processed only during regular business hours and may not be available until the next business day. Your provider may request additional information or to have a follow-up visit with you prior to refilling your medication.   *Please Note: Medication refills are assigned a new Rx number when refilled electronically. Your pharmacy may indicate that no refills were authorized even though a new prescription for the same medication is available at the pharmacy. Please request the medicine by name with the pharmacy before contacting your provider for a refill.        Referral     A referral request has been sent to our patient care coordination department. Please allow 3-5 business days for us to process this request and contact you either by phone or mail. If you do not hear from us by the 5th business day, please call us at [phone].           ZenDay Access Code: Activation code not " generated  Current MyChart Status: Active

## 2017-03-27 NOTE — Clinical Note
[address],   [address] [place] [address]  Phone: [phone] - Fax: [phone]        [facility] Progress Report and Disability Certification  Date of Service: 3/27/2017   No Show:  No  Date / Time of Next Visit: 4/11/2017@1:00PM   Claim Information   Patient Name: Jodi Lopez  Claim Number:     Employer:   [employer]    Date of Injury: 2/23/2017     Insurer / TPA: [employer]iki  ID / SSN:     Occupation:    Diagnosis: Diagnoses of Strain of right biceps, subsequent encounter and Contusion of right elbow, subsequent encounter were pertinent to this visit.    Medical Information   Related to Industrial Injury?   Comments:indeterminate    Subjective Complaints:  DOI 2/23/17  Patient works as a .  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  She fell to the side, sustaining a blow to the right elbow. Patient now does not have pain at the elbow but has pain on the bicep since the injury. She states the pain is in the body of the bicep and then has minimal pain at the  forearm. She states the pain is worse with lifting and flexing. Denies any numbness or tingling or radiating pain. She has an taking naproxen for relief which has been helping.    Objective Findings: Right arm: No bruising or gross deformity. Moderate tenderness over body of bicep. Minimal tenderness over the musculature of her arm. No defect in the biceps tendon felt. Full range of motion with pain with arm flexion. Strength intact but pain with resisted flexion.   Pre-Existing Condition(s):     Assessment:   Condition Same    Status: Additional Care Required  Permanent Disability:No    Plan:      Diagnostics:      Comments:  Referral physical therapy  Prescribed more naproxen  Restricted duty  Follow-up 2 weeks    Disability Information   Status: Released to  Restricted Duty    From:  3/27/2017  Through: 4/11/2017 Restrictions are: Temporary   Physical Restrictions   Sitting:    Standing:    Stooping:    Bending:      Squatting:    Walking:    Climbing:    Pushing:      Pulling:    Other:    Reaching Above Shoulder (L):   Reaching Above Shoulder (R):       Reaching Below Shoulder (L):    Reaching Below Shoulder (R):      Not to exceed Weight Limits   Carrying(hrs):   Weight Limit(lb):   Lifting(hrs):   Weight  Limit(lb):     Comments: Limit right arm to less than 10 lbs push/pull/lift    Repetitive Actions   Hands: i.e. Fine Manipulations from Grasping:     Feet: i.e. Operating Foot Controls:     Driving / Operate Machinery:     Physician Name: Kaiden Yeh D.O. Physician Signature: KAIDEN French D.O. e-Signature: Dr. Juvenal Johnson, Medical Director   Clinic Name / Location: [address],   [address] [place] [address] Clinic Phone Number: Dept: [phone]   Appointment Time: 1:20 Pm Visit Start Time: 1:15 PM   Check-In Time:  1:07 Pm Visit Discharge Time:  @2:03PM    Original-Treating Physician or Chiropractor    Page 2-Insurer/TPA    Page 3-Employer    Page 4-Employee

## 2017-03-27 NOTE — PROGRESS NOTES
"Subjective:      Jodi Lopez is a 61 y.o. female who presents with Work-Related Injury      DOI 2/23/17  Patient works as a .  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  She fell to the side, sustaining a blow to the right elbow. Patient now does not have pain at the elbow but has pain on the bicep since the injury. She states the pain is in the body of the bicep and then has minimal pain at the  forearm. She states the pain is worse with lifting and flexing. Denies any numbness or tingling or radiating pain. She has an taking naproxen for relief which has been helping.      HPI    Review of Systems   HENT: Negative.    Gastrointestinal: Negative.    Musculoskeletal:        Bicep pain, right     Skin: Negative.    Neurological: Negative.      PMH:  has a past medical history of NEGATIVE HISTORY OF; Arthritis; Arrhythmia; Cataract; Heart burn; Anesthesia; Blood clotting disorder (CMS-HCC) (1981); Snoring; TIA (transient ischemic attack) (10/2015); Cold sore (8/1/2016); Listeria meningitis (1985); and Renal disorder (2016).  MEDS:   Current outpatient prescriptions:   •  naproxen (NAPROSYN) 500 MG Tab, Take 1 Tab by mouth 2 times a day, with meals., Disp: 60 Tab, Rfl: 0  •  Glucosamine HCl-MSM (GLUCOSAMINE-MSM PO), Take 1 Tab by mouth as needed., Disp: , Rfl:   •  Biotin 5000 MCG Cap, Take 1 Cap by mouth every day., Disp: , Rfl:   •  Diclofenac Sodium 1 % Gel, Apply 1 Application to skin as directed as needed., Disp: , Rfl:   •  NON SPECIFIED, Take 1 Tab by mouth every day. OTC  \"Nutri-Calm\", Disp: , Rfl:   •  Magnesium 400 MG Tab, Take 1 Tab by mouth as needed., Disp: , Rfl:   •  aspirin 81 MG tablet, Take 81 mg by mouth 1 time daily as needed., Disp: , Rfl:   •  TURMERIC PO, Take 1 Tab by mouth every day., Disp: , Rfl:   •  Calcium-Magnesium-Vitamin D (CALCIUM MAGNESIUM PO), Take 1 Tab by mouth as needed., Disp: , " "Rfl:   •  vitamin D (CHOLECALCIFEROL) 1000 UNIT TABS, Take 1,000-2,000 Units by mouth every day., Disp: , Rfl:   •  Multiple Vitamin (MULTI-VITAMIN DAILY PO), Take 1 Tab by mouth every day., Disp: , Rfl:   ALLERGIES:   Allergies   Allergen Reactions   • Ammonia      woozy    • Erythromycin Rash   • Food Shortness of Breath     Blue cheese feel weird  Jalapeno   • Lipitor [Atorvastatin Calcium] Rash   • Lisinopril Rash     rash   • Other Misc Rash     OTC herbal \"Thuja\"   • Penicillins      Tongue swelling   • Tape Rash and Itching     Rash at site (usually sensitive areas of skin)  Tape, EKG pads   • Vancomycin      Unsure of reaction     SURGHX:   Past Surgical History   Procedure Laterality Date   • Tubal ligation     • Tonsillectomy     • Breast biopsy     • Other  2015     varicose vein right leg   • Other  2015     cataracts bilateral   • Hemorrhoidectomy  8/5/2016     Procedure: HEMORRHOIDECTOMY SINGLE QUADRANT;  Surgeon: Alpesh Gregory M.D.;  Location: SURGERY [facility];  Service:      SOCHX:  reports that she has been passively smoking.  She does not have any smokeless tobacco history on file. She reports that she drinks alcohol. She reports that she does not use illicit drugs.  FH: In accordance with [place] Act of 2008, family history is not collected for Occupational Health visits.         Objective:     /72 mmHg  Pulse 78  Temp(Src) 37.2 °C (98.9 °F)  Resp 16  Ht 1.676 m (5' 6\")  Wt 99.791 kg (220 lb)  BMI 35.53 kg/m2  SpO2 94%     Physical Exam   Constitutional: She is oriented to person, place, and time. She appears well-developed and well-nourished.   Cardiovascular: Normal rate.    Pulmonary/Chest: Effort normal.   Neurological: She is alert and oriented to person, place, and time.   Skin: Skin is warm and dry.   Psychiatric: She has a normal mood and affect. Her behavior is normal.       Right arm: No bruising or gross deformity. Moderate tenderness over body of bicep. Minimal " tenderness over the musculature of her arm. No defect in the biceps tendon felt. Full range of motion with pain with arm flexion. Strength intact but pain with resisted flexion.       Assessment/Plan:     1. Strain of right biceps, subsequent encounter  - REFERRAL TO PHYSICAL THERAPY Reason for Therapy: Eval/Treat/Report  - naproxen (NAPROSYN) 500 MG Tab; Take 1 Tab by mouth 2 times a day, with meals.  Dispense: 60 Tab; Refill: 0    2. Contusion of right elbow, subsequent encounter  - REFERRAL TO PHYSICAL THERAPY Reason for Therapy: Eval/Treat/Report  - naproxen (NAPROSYN) 500 MG Tab; Take 1 Tab by mouth 2 times a day, with meals.  Dispense: 60 Tab; Refill: 0    Referral physical therapy  Prescribed more naproxen  Restricted duty  Follow-up 2 weeks

## 2017-05-02 ENCOUNTER — OCCUPATIONAL MEDICINE (OUTPATIENT)
Dept: OCCUPATIONAL MEDICINE | Facility: CLINIC | Age: 62
End: 2017-05-02
Payer: COMMERCIAL

## 2017-05-02 VITALS
OXYGEN SATURATION: 95 % | DIASTOLIC BLOOD PRESSURE: 82 MMHG | SYSTOLIC BLOOD PRESSURE: 140 MMHG | TEMPERATURE: 98.2 F | HEART RATE: 90 BPM | WEIGHT: 219 LBS | RESPIRATION RATE: 16 BRPM | HEIGHT: 66 IN | BODY MASS INDEX: 35.2 KG/M2

## 2017-05-02 DIAGNOSIS — S46.211D STRAIN OF RIGHT BICEPS, SUBSEQUENT ENCOUNTER: ICD-10-CM

## 2017-05-02 DIAGNOSIS — S50.01XD CONTUSION OF RIGHT ELBOW, SUBSEQUENT ENCOUNTER: ICD-10-CM

## 2017-05-02 PROCEDURE — 99212 OFFICE O/P EST SF 10 MIN: CPT | Performed by: PREVENTIVE MEDICINE

## 2017-05-02 NOTE — Clinical Note
[address],   [address] [place] [address]  Phone: [phone] - Fax: [phone]        [facility] Progress Report and Disability Certification  Date of Service: 5/2/2017   No Show:  No  Date / Time of Next Visit: 6/2/2017 @ 1:30pm   Claim Information   Patient Name: Jodi Lopez  Claim Number:     Employer:   [employer] Date of Injury: 2/23/2017     Insurer / TPA: Kiki  ID / SSN:     Occupation:    Diagnosis: Diagnoses of Strain of right biceps, subsequent encounter and Contusion of right elbow, subsequent encounter were pertinent to this visit.    Medical Information   Related to Industrial Injury? Yes    Subjective Complaints:  DOI 2/23/17  Patient works as a .  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  She fell to the side, sustaining a blow to the right elbow. Patient states that elbow pain continues to be improved. She states that her right bicep pain is improving as well she states she'll has pain when trying to reach and left something behind her. She states she has completed 5 visits of physical therapy and has 5 more left. She states is helping. She states just doing normal activities does not seem to bother her arm. She takes naproxen occasionally for relief when needed. She states she's been working full duty without difficulty.   Objective Findings: Right arm: No bruising or gross deformity. Minimal tenderness over body of bicep. No defect in the biceps tendon felt. Full range of motion with minimal pain with flexion. Strength intact. Sensation intact   Pre-Existing Condition(s):     Assessment:   Condition Improved    Status: Additional Care Required  Permanent Disability:No    Plan:      Diagnostics:      Comments:  Continue physical therapy  Continue naproxen as needed  Follow-up one month  Full duty       Disability Information   Status: Released to Full Duty    From:  5/2/2017  Through: 6/2/2017 Restrictions are:     Physical Restrictions   Sitting:    Standing:    Stooping:    Bending:      Squatting:    Walking:    Climbing:    Pushing:      Pulling:    Other:    Reaching Above Shoulder (L):   Reaching Above Shoulder (R):       Reaching Below Shoulder (L):    Reaching Below Shoulder (R):      Not to exceed Weight Limits   Carrying(hrs):   Weight Limit(lb):   Lifting(hrs):   Weight  Limit(lb):     Comments:      Repetitive Actions   Hands: i.e. Fine Manipulations from Grasping:     Feet: i.e. Operating Foot Controls:     Driving / Operate Machinery:     Physician Name: Kaiden Yeh D.O. Physician Signature: KAIDEN French D.O. e-Signature: Dr. Juvenal Johnson, Medical Director   Clinic Name / Location: [address],   [address] [place] [address] Clinic Phone Number: Dept: [phone]   Appointment Time: 1:40 Pm Visit Start Time: 1:46 PM   Check-In Time:  1:39 Pm Visit Discharge Time:  2:10pm   Original-Treating Physician or Chiropractor    Page 2-Insurer/TPA    Page 3-Employer    Page 4-Employee

## 2017-05-02 NOTE — PROGRESS NOTES
"Subjective:      Jodi Lopez is a 62 y.o. female who presents with Other      DOI 2/23/17  Patient works as a .  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  She fell to the side, sustaining a blow to the right elbow. Patient states that elbow pain continues to be improved. She states that her right bicep pain is improving as well she states she'll has pain when trying to reach and left something behind her. She states she has completed 5 visits of physical therapy and has 5 more left. She states is helping. She states just doing normal activities does not seem to bother her arm. She takes naproxen occasionally for relief when needed. She states she's been working full duty without difficulty.     Other        ROS       Objective:     /82 mmHg  Pulse 90  Temp(Src) 36.8 °C (98.2 °F)  Resp 16  Ht 1.676 m (5' 5.98\")  Wt 99.338 kg (219 lb)  BMI 35.36 kg/m2  SpO2 95%     Physical Exam    Right arm: No bruising or gross deformity. Minimal tenderness over body of bicep. No defect in the biceps tendon felt. Full range of motion with minimal pain with flexion. Strength intact. Sensation intact       Assessment/Plan:     1. Strain of right biceps, subsequent encounte    2. Contusion of right elbow, subsequent encounter    ontinue physical therapy  Continue naproxen as needed  Follow-up one month  Full duty      "

## 2017-05-02 NOTE — MR AVS SNAPSHOT
"        Jodi Lopez   2017 1:40 PM   Occupational Medicine   MRN: [id number]    Department:  [facility]   Dept Phone:  [phone]    Description:  Female : 1955   Provider:  Kaiden Yeh D.O.           Reason for Visit     Other WC [facility] DOI 17 (R) arm, better, room 2      Allergies as of 2017     Allergen Noted Reactions    Ammonia 2016       woozy     Erythromycin 2012   Rash    Food 2016   Shortness of Breath    Blue cheese feel weird  Jalapeno    Lipitor [Atorvastatin Calcium] 2013   Rash    Lisinopril 2015   Rash    rash    Other Misc 2016   Rash    OTC herbal \"Thuja\"    Penicillins 2012       Tongue swelling    Tape 2016   Rash, Itching    Rash at site (usually sensitive areas of skin)  Tape, EKG pads    Vancomycin 2012       Unsure of reaction      You were diagnosed with     Strain of right biceps, subsequent encounter   [[id number]]       Contusion of right elbow, subsequent encounter   [[id number]]         Vital Signs     Blood Pressure Pulse Temperature Respirations Height Weight    140/82 mmHg 90 36.8 °C (98.2 °F) 16 1.676 m (5' 5.98\") 99.338 kg (219 lb)    Body Mass Index Oxygen Saturation Smoking Status             35.36 kg/m2 95% Passive Smoke Exposure - Never Smoker         Basic Information     Date Of Birth Sex Race Ethnicity Preferred Language    1955 Female White Non- English      Your appointments     2017  1:30 PM   Workers Compensation with Kaiden Yeh D.O.   [address] [address] [phone]              Problem List              ICD-10-CM Priority Class Noted - Resolved    Left knee injury S89.92XA   2012 - Present    Internal derangement of knee    2012 - Present    Renal cyst N28.1   2013 - Present    CKD (chronic kidney disease), stage I N18.1   2013 - Present    HTN (hypertension) I10   " 9/13/2013 - Present    Varicose vein I86.8   6/5/2015 - Present    Hemorrhoids K64.9   8/5/2016 - Present      Health Maintenance        Date Due Completion Dates    IMM DTaP/Tdap/Td Vaccine (1 - Tdap) 4/28/1973 ---    PAP SMEAR 4/28/1975 ---    COLONOSCOPY 4/28/2004 ---    MAMMOGRAM 3/6/2007 3/6/2006, 3/1/2005    IMM ZOSTER VACCINE 4/28/2014 ---            Current Immunizations     No immunizations on file.      Below and/or attached are the medications your provider expects you to take. Review all of your home medications and newly ordered medications with your provider and/or pharmacist. Follow medication instructions as directed by your provider and/or pharmacist. Please keep your medication list with you and share with your provider. Update the information when medications are discontinued, doses are changed, or new medications (including over-the-counter products) are added; and carry medication information at all times in the event of emergency situations     Allergies:  AMMONIA - (reactions not documented)     ERYTHROMYCIN - Rash     FOOD - Shortness of Breath     LIPITOR - Rash     LISINOPRIL - Rash     OTHER MISC - Rash     PENICILLINS - (reactions not documented)     TAPE - Rash,Itching     VANCOMYCIN - (reactions not documented)               Medications  Valid as of: May 02, 2017 -  2:15 PM    Generic Name Brand Name Tablet Size Instructions for use    Aspirin (Tab) aspirin 81 MG Take 81 mg by mouth 1 time daily as needed.        Biotin (Cap) Biotin 5000 MCG Take 1 Cap by mouth every day.        Calcium-Magnesium-Vitamin D   Take 1 Tab by mouth as needed.        Cholecalciferol (Tab) cholecalciferol 1000 UNIT Take 1,000-2,000 Units by mouth every day.        Diclofenac Sodium (Gel) Diclofenac Sodium 1 % Apply 1 Application to skin as directed as needed.        Glucosamine HCl-MSM   Take 1 Tab by mouth as needed.        Magnesium (Tab) Magnesium 400 MG Take 1 Tab by mouth as needed.        Multiple  "Vitamin   Take 1 Tab by mouth every day.        Naproxen (Tab) NAPROSYN 500 MG Take 1 Tab by mouth 2 times a day, with meals.        NON SPECIFIED   Take 1 Tab by mouth every day. OTC  \"Nutri-Calm\"        Turmeric   Take 1 Tab by mouth every day.        .                 Medicines prescribed today were sent to:     [facility][id number] - [place], [place] - [address]    [address] [place] [address]    Phone: [phone] Fax: [phone]    Open 24 Hours?: No      Medication refill instructions:       If your prescription bottle indicates you have medication refills left, it is not necessary to call your provider’s office. Please contact your pharmacy and they will refill your medication.    If your prescription bottle indicates you do not have any refills left, you may request refills at any time through one of the following ways: The online [employer]ility system (except Urgent Care), by calling your provider’s office, or by asking your pharmacy to contact your provider’s office with a refill request. Medication refills are processed only during regular business hours and may not be available until the next business day. Your provider may request additional information or to have a follow-up visit with you prior to refilling your medication.   *Please Note: Medication refills are assigned a new Rx number when refilled electronically. Your pharmacy may indicate that no refills were authorized even though a new prescription for the same medication is available at the pharmacy. Please request the medicine by name with the pharmacy before contacting your provider for a refill.           Adility Access Code: Activation code not generated  Current Adility Status: Active          "

## 2018-08-17 ENCOUNTER — OFFICE VISIT (OUTPATIENT)
Dept: CARDIOLOGY | Facility: MEDICAL CENTER | Age: 63
End: 2018-08-17
Payer: COMMERCIAL

## 2018-08-17 VITALS
WEIGHT: 213 LBS | DIASTOLIC BLOOD PRESSURE: 75 MMHG | HEART RATE: 62 BPM | HEIGHT: 66 IN | OXYGEN SATURATION: 96 % | SYSTOLIC BLOOD PRESSURE: 149 MMHG | BODY MASS INDEX: 34.23 KG/M2

## 2018-08-17 DIAGNOSIS — R09.89 BRUIT OF LEFT CAROTID ARTERY: ICD-10-CM

## 2018-08-17 DIAGNOSIS — I10 ESSENTIAL HYPERTENSION: ICD-10-CM

## 2018-08-17 PROCEDURE — 99214 OFFICE O/P EST MOD 30 MIN: CPT | Performed by: INTERNAL MEDICINE

## 2018-08-17 RX ORDER — IBUPROFEN 200 MG
400 TABLET ORAL EVERY 6 HOURS PRN
COMMUNITY
End: 2023-04-25

## 2018-08-17 ASSESSMENT — ENCOUNTER SYMPTOMS
PALPITATIONS: 0
INSOMNIA: 0
SHORTNESS OF BREATH: 0
DEPRESSION: 0
HEARTBURN: 0
MYALGIAS: 0
NERVOUS/ANXIOUS: 0
WEIGHT LOSS: 1
DIZZINESS: 0
BRUISES/BLEEDS EASILY: 0
COUGH: 0
EYES NEGATIVE: 1
PND: 0

## 2018-08-17 NOTE — PROGRESS NOTES
Chief Complaint   Patient presents with   • HTN (Controlled)     follow up       Subjective:   Jodi Lopez is a 63 y.o. female who presents today in follow-up in regards to her concern for arrhythmia and hypertension    Many questions about thyroid and night sweats  Nervous that her blood pressure is different in different arms  No medications, active no chest pains or limitations    Past Medical History:   Diagnosis Date   • Anesthesia     slow to wake, takes several days to clear   • Arrhythmia    • Arthritis    • Blood clotting disorder (HCC) 1981    leg   • Cataract     bilateral IOL   • Cold sore 8/1/2016   • Heart burn     back and legs   • Listeria meningitis 1985   • NEGATIVE HISTORY OF    • Renal disorder 2016    large left kidney cyst   • Snoring    • TIA (transient ischemic attack) 10/2015    possible     Past Surgical History:   Procedure Laterality Date   • HEMORRHOIDECTOMY  8/5/2016    Procedure: HEMORRHOIDECTOMY SINGLE QUADRANT;  Surgeon: Alpesh Gregory M.D.;  Location: SURGERY [facility];  Service:    • OTHER  2015    varicose vein right leg   • OTHER  2015    cataracts bilateral   • BREAST BIOPSY     • TONSILLECTOMY     • TUBAL LIGATION       Family History   Problem Relation Age of Onset   • Heart Disease Unknown    • Arthritis Neg Hx    • Cancer Neg Hx      Social History     Social History   • Marital status:      Spouse name: N/A   • Number of children: N/A   • Years of education: N/A     Occupational History   •       Social History Main Topics   • Smoking status: Passive Smoke Exposure - Never Smoker   • Smokeless tobacco: Never Used   • Alcohol use Yes      Comment: 0-1 per day   • Drug use: No   • Sexual activity: Not on file     Other Topics Concern   • Not on file     Social History Narrative   • No narrative on file     Allergies   Allergen Reactions   • Ammonia      woozy    • Erythromycin Rash   • Food Shortness of Breath     Blue cheese feel  "ange Mora   • Lipitor [Atorvastatin Calcium] Rash   • Lisinopril Rash     rash   • Other Misc Rash     OTC herbal \"Thuja\"   • Penicillins      Tongue swelling   • Tape Rash and Itching     Rash at site (usually sensitive areas of skin)  Tape, EKG pads   • Vancomycin      Unsure of reaction     Outpatient Encounter Prescriptions as of 8/17/2018   Medication Sig Dispense Refill   • ibuprofen (MOTRIN) 200 MG Tab Take 200 mg by mouth every 6 hours as needed.     • Glucosamine HCl-MSM (GLUCOSAMINE-MSM PO) Take 1 Tab by mouth as needed.     • Diclofenac Sodium 1 % Gel Apply 1 Application to skin as directed as needed.     • NON SPECIFIED Take 1 Tab by mouth every day. OTC  \"Nutri-Calm\"     • Magnesium 400 MG Tab Take 1 Tab by mouth as needed.     • aspirin 81 MG tablet Take 81 mg by mouth every 48 hours.     • TURMERIC PO Take 1 Tab by mouth every day.     • Calcium-Magnesium-Vitamin D (CALCIUM MAGNESIUM PO) Take 1 Tab by mouth as needed.     • vitamin D (CHOLECALCIFEROL) 1000 UNIT TABS Take 1,000-2,000 Units by mouth every day.     • Multiple Vitamin (MULTI-VITAMIN DAILY PO) Take 1 Tab by mouth every day.     • [DISCONTINUED] naproxen (NAPROSYN) 500 MG Tab Take 1 Tab by mouth 2 times a day, with meals. (Patient not taking: Reported on 8/17/2018) 60 Tab 0   • [DISCONTINUED] Biotin 5000 MCG Cap Take 1 Cap by mouth every day.       No facility-administered encounter medications on file as of 8/17/2018.      Review of Systems   Constitutional: Positive for weight loss. Negative for malaise/fatigue.   HENT: Negative for hearing loss.    Eyes: Negative.    Respiratory: Negative for cough and shortness of breath.    Cardiovascular: Negative for chest pain, palpitations and PND.   Gastrointestinal: Negative for heartburn.   Genitourinary: Negative for dysuria.   Musculoskeletal: Negative for joint pain and myalgias.   Neurological: Negative for dizziness.   Endo/Heme/Allergies: Negative.  Does not bruise/bleed easily. " "  Psychiatric/Behavioral: Negative for depression and suicidal ideas. The patient is not nervous/anxious and does not have insomnia.    All other systems reviewed and are negative.       Objective:   /75   Pulse 62   Ht 1.676 m (5' 6\")   Wt 96.6 kg (213 lb)   SpO2 96%   BMI 34.38 kg/m²     Physical Exam   Constitutional: She is oriented to person, place, and time. She appears well-developed.   Overweight   HENT:   Head: Normocephalic and atraumatic.   Eyes: Pupils are equal, round, and reactive to light. EOM are normal.   Neck: No JVD present. No thyromegaly present.   Cardiovascular: Normal rate, regular rhythm and intact distal pulses.  Exam reveals no gallop.    No murmur heard.  Normal carotid upstrokes, left-sided bruit   Pulmonary/Chest: Breath sounds normal. No respiratory distress. She exhibits no tenderness.   Abdominal: Bowel sounds are normal. She exhibits no distension.   Musculoskeletal: She exhibits no edema or tenderness.   Lymphadenopathy:     She has no cervical adenopathy.   Neurological: She is alert and oriented to person, place, and time. She exhibits normal muscle tone. Coordination normal.   Skin: Skin is warm and dry. No rash noted.   Psychiatric: She has a normal mood and affect. Her behavior is normal.       Assessment:     1. Essential hypertension  Carotid Duplex   2. Bruit of left carotid artery         Medical Decision Making:  Today's Assessment / Status / Plan:       Elevated blood pressure  As last time we spent more than 25 minutes going over national guidelines talking about weight and diet.  I suggested she follow with her primary care and's focus on her weight loss.  I congratulated her on losing 4 pounds  Talked about medication and home monitoring.  She voices understanding.  I requested her blood work from earlier this year    Differential blood pressure  Likely normative, I do her left-sided bruit and we will check a carotid and subclavian ultrasound    Talked " about hyperlipidemia and cardiac risk.  Again voices understanding and will follow with primary  Follow-up here as needed based on testing

## 2018-08-30 ENCOUNTER — TELEPHONE (OUTPATIENT)
Dept: CARDIOLOGY | Facility: MEDICAL CENTER | Age: 63
End: 2018-08-30

## 2018-08-30 NOTE — TELEPHONE ENCOUNTER
----- Message -----   From: Shelia Corrales M.D.   Sent: 8/30/2018   1:11 PM   To: Naa Edge R.N.     Good news   Only mild plaque noted   Follow up in 2-3y, will keep an eye on cholesterol      Called pt and notified, pt verbalizes understanding

## 2019-10-07 RX ORDER — DIPHENHYDRAMINE HCL 25 MG
25 TABLET ORAL EVERY 6 HOURS PRN
Status: ON HOLD | COMMUNITY
End: 2020-06-04

## 2019-10-07 RX ORDER — TRAMADOL HYDROCHLORIDE 50 MG/1
50 TABLET ORAL EVERY 4 HOURS PRN
Status: ON HOLD | COMMUNITY
End: 2020-06-04

## 2019-10-07 RX ORDER — FAMOTIDINE 20 MG/1
20 TABLET, FILM COATED ORAL 2 TIMES DAILY
COMMUNITY
End: 2020-06-02

## 2019-10-07 RX ORDER — CALCIUM CARBONATE 500 MG/1
500 TABLET, CHEWABLE ORAL 3 TIMES DAILY PRN
COMMUNITY

## 2019-10-07 SDOH — HEALTH STABILITY: MENTAL HEALTH: HOW OFTEN DO YOU HAVE A DRINK CONTAINING ALCOHOL?: 2-4 TIMES A MONTH

## 2019-10-09 ENCOUNTER — HOSPITAL ENCOUNTER (OUTPATIENT)
Facility: MEDICAL CENTER | Age: 64
End: 2019-10-09
Attending: SURGERY | Admitting: SURGERY
Payer: MEDICARE

## 2019-10-09 ENCOUNTER — APPOINTMENT (OUTPATIENT)
Dept: RADIOLOGY | Facility: MEDICAL CENTER | Age: 64
End: 2019-10-09
Attending: SURGERY
Payer: MEDICARE

## 2019-10-09 ENCOUNTER — ANESTHESIA EVENT (OUTPATIENT)
Dept: SURGERY | Facility: MEDICAL CENTER | Age: 64
End: 2019-10-09
Payer: MEDICARE

## 2019-10-09 ENCOUNTER — ANESTHESIA (OUTPATIENT)
Dept: SURGERY | Facility: MEDICAL CENTER | Age: 64
End: 2019-10-09
Payer: MEDICARE

## 2019-10-09 VITALS
BODY MASS INDEX: 36.07 KG/M2 | RESPIRATION RATE: 18 BRPM | OXYGEN SATURATION: 93 % | WEIGHT: 224.43 LBS | DIASTOLIC BLOOD PRESSURE: 69 MMHG | SYSTOLIC BLOOD PRESSURE: 131 MMHG | HEART RATE: 77 BPM | TEMPERATURE: 97.9 F | HEIGHT: 66 IN

## 2019-10-09 DIAGNOSIS — S52.571A OTHER CLOSED INTRA-ARTICULAR FRACTURE OF DISTAL END OF RIGHT RADIUS, INITIAL ENCOUNTER: ICD-10-CM

## 2019-10-09 PROBLEM — E66.9 OBESITY (BMI 30-39.9): Status: ACTIVE | Noted: 2019-10-09

## 2019-10-09 PROBLEM — T88.59XA DELAYED EMERGENCE FROM ANESTHESIA: Status: ACTIVE | Noted: 2019-10-09

## 2019-10-09 PROCEDURE — 160025 RECOVERY II MINUTES (STATS): Performed by: SURGERY

## 2019-10-09 PROCEDURE — 160046 HCHG PACU - 1ST 60 MINS PHASE II: Performed by: SURGERY

## 2019-10-09 PROCEDURE — 500312 HCHG CONNECTOR, BLAKE DRAIN 1-WAY: Performed by: SURGERY

## 2019-10-09 PROCEDURE — 500881 HCHG PACK, EXTREMITY: Performed by: SURGERY

## 2019-10-09 PROCEDURE — 73100 X-RAY EXAM OF WRIST: CPT | Mod: RT

## 2019-10-09 PROCEDURE — 700105 HCHG RX REV CODE 258: Performed by: SURGERY

## 2019-10-09 PROCEDURE — 160035 HCHG PACU - 1ST 60 MINS PHASE I: Performed by: SURGERY

## 2019-10-09 PROCEDURE — A9270 NON-COVERED ITEM OR SERVICE: HCPCS | Performed by: ANESTHESIOLOGY

## 2019-10-09 PROCEDURE — C1713 ANCHOR/SCREW BN/BN,TIS/BN: HCPCS | Performed by: SURGERY

## 2019-10-09 PROCEDURE — 160028 HCHG SURGERY MINUTES - 1ST 30 MINS LEVEL 3: Performed by: SURGERY

## 2019-10-09 PROCEDURE — 160009 HCHG ANES TIME/MIN: Performed by: SURGERY

## 2019-10-09 PROCEDURE — 700102 HCHG RX REV CODE 250 W/ 637 OVERRIDE(OP): Performed by: ANESTHESIOLOGY

## 2019-10-09 PROCEDURE — 501838 HCHG SUTURE GENERAL: Performed by: SURGERY

## 2019-10-09 PROCEDURE — 160039 HCHG SURGERY MINUTES - EA ADDL 1 MIN LEVEL 3: Performed by: SURGERY

## 2019-10-09 PROCEDURE — 700101 HCHG RX REV CODE 250: Performed by: ANESTHESIOLOGY

## 2019-10-09 PROCEDURE — 160036 HCHG PACU - EA ADDL 30 MINS PHASE I: Performed by: SURGERY

## 2019-10-09 PROCEDURE — 700111 HCHG RX REV CODE 636 W/ 250 OVERRIDE (IP): Performed by: ANESTHESIOLOGY

## 2019-10-09 PROCEDURE — 160048 HCHG OR STATISTICAL LEVEL 1-5: Performed by: SURGERY

## 2019-10-09 PROCEDURE — 160002 HCHG RECOVERY MINUTES (STAT): Performed by: SURGERY

## 2019-10-09 PROCEDURE — 700105 HCHG RX REV CODE 258: Performed by: ANESTHESIOLOGY

## 2019-10-09 DEVICE — SCREW HEX CORTICAL 3.2 12MM (1TCONX5=5): Type: IMPLANTABLE DEVICE | Site: WRIST | Status: FUNCTIONAL

## 2019-10-09 DEVICE — PLATE BRIDGE (1TCONX1=1): Type: IMPLANTABLE DEVICE | Site: WRIST | Status: FUNCTIONAL

## 2019-10-09 DEVICE — WIRE K- SMTH .045 4 (6TX6=36) (6EA/PK): Type: IMPLANTABLE DEVICE | Site: WRIST | Status: FUNCTIONAL

## 2019-10-09 DEVICE — WIRE K- SMTH .062 4 - (6TX6=36): Type: IMPLANTABLE DEVICE | Site: WRIST | Status: FUNCTIONAL

## 2019-10-09 DEVICE — SUTURE ANCHOR TWINFIX 2.8 WITH NEEDLES SMALL JOINT: Type: IMPLANTABLE DEVICE | Site: WRIST | Status: FUNCTIONAL

## 2019-10-09 DEVICE — SCREW HEX CORTICAL 3.2 14MM (1TCONX5=5): Type: IMPLANTABLE DEVICE | Site: WRIST | Status: FUNCTIONAL

## 2019-10-09 DEVICE — SCREW HEX CORTICAL 3.2 16MM (1TCONX4=4): Type: IMPLANTABLE DEVICE | Site: WRIST | Status: FUNCTIONAL

## 2019-10-09 RX ORDER — OXYCODONE HCL 5 MG/5 ML
SOLUTION, ORAL ORAL
Status: DISCONTINUED
Start: 2019-10-09 | End: 2019-10-09 | Stop reason: HOSPADM

## 2019-10-09 RX ORDER — HYDRALAZINE HYDROCHLORIDE 20 MG/ML
5 INJECTION INTRAMUSCULAR; INTRAVENOUS
Status: DISCONTINUED | OUTPATIENT
Start: 2019-10-09 | End: 2019-10-09 | Stop reason: HOSPADM

## 2019-10-09 RX ORDER — CEFAZOLIN SODIUM 1 G/3ML
INJECTION, POWDER, FOR SOLUTION INTRAMUSCULAR; INTRAVENOUS PRN
Status: DISCONTINUED | OUTPATIENT
Start: 2019-10-09 | End: 2019-10-09 | Stop reason: HOSPADM

## 2019-10-09 RX ORDER — CELECOXIB 200 MG/1
200 CAPSULE ORAL ONCE
Status: COMPLETED | OUTPATIENT
Start: 2019-10-09 | End: 2019-10-09

## 2019-10-09 RX ORDER — ACETAMINOPHEN 500 MG
1000 TABLET ORAL ONCE
Status: COMPLETED | OUTPATIENT
Start: 2019-10-09 | End: 2019-10-09

## 2019-10-09 RX ORDER — LIDOCAINE HYDROCHLORIDE 10 MG/ML
INJECTION, SOLUTION EPIDURAL; INFILTRATION; INTRACAUDAL; PERINEURAL
Status: COMPLETED
Start: 2019-10-09 | End: 2019-10-09

## 2019-10-09 RX ORDER — SODIUM CHLORIDE, SODIUM LACTATE, POTASSIUM CHLORIDE, CALCIUM CHLORIDE 600; 310; 30; 20 MG/100ML; MG/100ML; MG/100ML; MG/100ML
INJECTION, SOLUTION INTRAVENOUS CONTINUOUS
Status: DISCONTINUED | OUTPATIENT
Start: 2019-10-09 | End: 2019-10-09 | Stop reason: HOSPADM

## 2019-10-09 RX ORDER — LABETALOL HYDROCHLORIDE 5 MG/ML
5 INJECTION, SOLUTION INTRAVENOUS
Status: DISCONTINUED | OUTPATIENT
Start: 2019-10-09 | End: 2019-10-09 | Stop reason: HOSPADM

## 2019-10-09 RX ORDER — OXYCODONE HCL 5 MG/5 ML
5 SOLUTION, ORAL ORAL ONCE
Status: COMPLETED | OUTPATIENT
Start: 2019-10-09 | End: 2019-10-09

## 2019-10-09 RX ORDER — MEPERIDINE HYDROCHLORIDE 25 MG/ML
12.5 INJECTION INTRAMUSCULAR; INTRAVENOUS; SUBCUTANEOUS
Status: DISCONTINUED | OUTPATIENT
Start: 2019-10-09 | End: 2019-10-09 | Stop reason: HOSPADM

## 2019-10-09 RX ORDER — OXYCODONE HYDROCHLORIDE 5 MG/1
5 TABLET ORAL EVERY 4 HOURS PRN
Qty: 30 TAB | Refills: 0 | Status: SHIPPED | OUTPATIENT
Start: 2019-10-09 | End: 2019-10-16

## 2019-10-09 RX ORDER — OXYCODONE HCL 5 MG/5 ML
5 SOLUTION, ORAL ORAL
Status: COMPLETED | OUTPATIENT
Start: 2019-10-09 | End: 2019-10-09

## 2019-10-09 RX ORDER — DEXAMETHASONE SODIUM PHOSPHATE 4 MG/ML
INJECTION, SOLUTION INTRA-ARTICULAR; INTRALESIONAL; INTRAMUSCULAR; INTRAVENOUS; SOFT TISSUE PRN
Status: DISCONTINUED | OUTPATIENT
Start: 2019-10-09 | End: 2019-10-09 | Stop reason: SURG

## 2019-10-09 RX ORDER — ONDANSETRON 2 MG/ML
4 INJECTION INTRAMUSCULAR; INTRAVENOUS
Status: DISCONTINUED | OUTPATIENT
Start: 2019-10-09 | End: 2019-10-09 | Stop reason: HOSPADM

## 2019-10-09 RX ORDER — OXYCODONE HCL 5 MG/5 ML
10 SOLUTION, ORAL ORAL
Status: COMPLETED | OUTPATIENT
Start: 2019-10-09 | End: 2019-10-09

## 2019-10-09 RX ORDER — BUPIVACAINE HYDROCHLORIDE 5 MG/ML
INJECTION, SOLUTION EPIDURAL; INTRACAUDAL PRN
Status: DISCONTINUED | OUTPATIENT
Start: 2019-10-09 | End: 2019-10-09 | Stop reason: SURG

## 2019-10-09 RX ORDER — HALOPERIDOL 5 MG/ML
1 INJECTION INTRAMUSCULAR
Status: DISCONTINUED | OUTPATIENT
Start: 2019-10-09 | End: 2019-10-09 | Stop reason: HOSPADM

## 2019-10-09 RX ADMIN — FENTANYL CITRATE 25 MCG: 50 INJECTION, SOLUTION INTRAMUSCULAR; INTRAVENOUS at 11:07

## 2019-10-09 RX ADMIN — BUPIVACAINE HYDROCHLORIDE 20 ML: 5 INJECTION, SOLUTION EPIDURAL; INTRACAUDAL; PERINEURAL at 10:38

## 2019-10-09 RX ADMIN — CEFAZOLIN 2 G: 330 INJECTION, POWDER, FOR SOLUTION INTRAMUSCULAR; INTRAVENOUS at 10:48

## 2019-10-09 RX ADMIN — OXYCODONE HYDROCHLORIDE 5 MG: 5 SOLUTION ORAL at 12:50

## 2019-10-09 RX ADMIN — LIDOCAINE HYDROCHLORIDE 100 MG: 20 INJECTION, SOLUTION INTRAVENOUS at 10:48

## 2019-10-09 RX ADMIN — DEXAMETHASONE SODIUM PHOSPHATE 8 MG: 4 INJECTION, SOLUTION INTRA-ARTICULAR; INTRALESIONAL; INTRAMUSCULAR; INTRAVENOUS; SOFT TISSUE at 10:51

## 2019-10-09 RX ADMIN — MIDAZOLAM HYDROCHLORIDE 0.5 MG: 1 INJECTION, SOLUTION INTRAMUSCULAR; INTRAVENOUS at 10:42

## 2019-10-09 RX ADMIN — OXYCODONE HYDROCHLORIDE 5 MG: 5 SOLUTION ORAL at 13:26

## 2019-10-09 RX ADMIN — SODIUM CHLORIDE, POTASSIUM CHLORIDE, SODIUM LACTATE AND CALCIUM CHLORIDE: 600; 310; 30; 20 INJECTION, SOLUTION INTRAVENOUS at 09:15

## 2019-10-09 RX ADMIN — CELECOXIB 200 MG: 200 CAPSULE ORAL at 09:04

## 2019-10-09 RX ADMIN — ACETAMINOPHEN 1000 MG: 500 TABLET ORAL at 09:04

## 2019-10-09 RX ADMIN — SODIUM CHLORIDE, POTASSIUM CHLORIDE, SODIUM LACTATE AND CALCIUM CHLORIDE: 600; 310; 30; 20 INJECTION, SOLUTION INTRAVENOUS at 13:57

## 2019-10-09 RX ADMIN — PROPOFOL 150 MG: 10 INJECTION, EMULSION INTRAVENOUS at 10:48

## 2019-10-09 RX ADMIN — FENTANYL CITRATE 50 MCG: 50 INJECTION, SOLUTION INTRAMUSCULAR; INTRAVENOUS at 10:48

## 2019-10-09 RX ADMIN — MIDAZOLAM HYDROCHLORIDE 0.5 MG: 1 INJECTION, SOLUTION INTRAMUSCULAR; INTRAVENOUS at 10:35

## 2019-10-09 RX ADMIN — FENTANYL CITRATE 25 MCG: 50 INJECTION, SOLUTION INTRAMUSCULAR; INTRAVENOUS at 11:37

## 2019-10-09 ASSESSMENT — PAIN SCALES - GENERAL: PAIN_LEVEL: 5

## 2019-10-09 NOTE — ANESTHESIA PROCEDURE NOTES
Peripheral Block  Date/Time: 10/9/2019 10:35 AM  Performed by: Beulah Garrido M.D.  Authorized by: Beulah Garrido M.D.     Start Time:  10/9/2019 10:35 AM  End Time:  10/9/2019 10:39 AM  Reason for Block: at surgeon's request and post-op pain management    patient identified, IV checked, site marked, risks and benefits discussed, surgical consent, monitors and equipment checked, pre-op evaluation and timeout performed    Patient Position:  Supine  Prep: ChloraPrep    Monitoring:  Heart rate, continuous pulse ox and cardiac monitor  Block Region:  Upper Extremity  Upper Extremity - Block Type:  BRACHIAL PLEXUS block, Supraclavicular approach    Laterality:  Right  Procedures: ultrasound guided  Image captured, interpreted and electronically stored.  Local Infiltration:  Lidocaine  Strength:  2 %  Dose:  3 ml  Block Type:  Single-shot  Needle Length:  100mm  Needle Gauge:  21 G  Needle Localization:  Ultrasound guidance  Injection Assessment:  Negative aspiration for heme, no paresthesia on injection, incremental injection and local visualized surrounding nerve on ultrasound  Evidence of intravascular injection: No     US Guided Supraclavicular Brachial Plexus Block    US transducer placed cephalad and parallel to clavicle in angle to view the subclavian artery with the brachial plexus lateral and superficial to the artery. Needle inserted lateral to the transducer in-plane and advanced with the needle tip visualized continually into the perineural position. After negative aspiration, LA injected without resistance.

## 2019-10-09 NOTE — OR NURSING
Patient complaining of 8 out of 10 pain to right hand.  I spoke to Dr. Garrido (anesthesia) about severity of pain and the lack of any iv analgesics to administer.  Dr. Garrido only wants me to give 5mg of oxycodone at this time.  Dr. Garrido at bedside to see patient in pacu bay 5B when she recommended the 5mg of oxycodone.  History of prolonged recovery/slow to wake up from anesthesia.  Patient is currently awake, alert and answering questions appropriately.  She is also verbalizing that her arm feels heavy and numb while at the same time her hand and fingers are aching.  I educated patient on the common sensations and purpose of nerve blocks.  Will continue to monitor patient's pain level and sensations that she reports to the affected extremity.

## 2019-10-09 NOTE — OP REPORT
DATE OF SERVICE:  10/09/2019    SURGEON:  Kwabena Michelle MD    ASSISTANT:  None.    PREOPERATIVE DIAGNOSES:  1.  Right comminuted intraarticular unstable distal radius fracture.  2.  Right wrist scapholunate ligament disruption.    POSTOPERATIVE DIAGNOSES:  1.  Right comminuted intraarticular unstable distal radius fracture.  2.  Right wrist scapholunate ligament disruption.    PROCEDURES:  1.  Open reduction and internal fixation, right distal radius (usage of bridge   plate for high comminution unstable pattern as well as a distal radius K   wire).  2.  Right wrist brachioradialis tenotomy.  3.  Right wrist posterior interosseous neurectomy.  4.  Right wrist scapholunate ligament repair and pinning.  5.  TFCC repair.    ANESTHESIOLOGIST:  Beulah Garrido MD    ANESTHESIA:  General with upper extremity nerve block for pain control.    COMPLICATIONS:  None noted.    DRAINS:  None.    SPECIMENS:  None.    ESTIMATED BLOOD LOSS:  Minimal.    TOURNIQUET TIME:  Less than 90 minutes at 250 mmHg.    INDICATIONS:  The patient is a 64-year-old female well known to me through my   outpatient clinic for the above-mentioned diagnosis.  She had significant   amount of comminution and displacement.  The carpus was dorsally translated   over the radius and ulna by over 100% with significant radial collapse and   gross deformity as well as scapholunate widening on imaging.  We discussed the   nature of injury.  We discussed the displacement.  We discussed conservative   versus operative treatment.  Discussed advantages and disadvantages of each,   patient elected to proceed with the above-mentioned procedure.  Prior to the   procedure, she understood the risks, benefits, and alternatives to surgery.    She understood the risks to include, but not be limited to the risk of   infection requiring repeat surgery, bleeding, blood transfusion, nerve, blood   vessel, tendon injury requiring repair, chronic pain, nonunion,  malunion,   hardware failure, requiring revision surgery, posttraumatic arthritis   requiring salvage procedure, DVT, pulmonary embolism, heart attack, stroke,   and even death.  The patient states despite these risks, she wished to proceed   with surgery.    DESCRIPTION OF PROCEDURE:  The patient was met in the preoperative holding   area.  Her right wrist was initialed as the correct operative site.  She had   an opportunity to ask questions, all questions were answered and informed   consent was obtained.  The patient was brought to the operating room and laid   supine on the operating room table.  All bony and dependent prominences were   well padded.  Upper extremity nerve block and general anesthesia were induced   without any complication.  Ancef was administered for infection prophylaxis.    Right upper extremity was prepped and draped in the usual sterile fashion.    Formal time-out was performed, in which all parties agreed upon the correct   patient, procedure, and operative site.  I began the procedure by using   Esmarch to exsanguinate the extremity and inflated the tourniquet to 250 mmHg.    I then made a longitudinal extensile approach to the dorsal aspect of the   wrist, dissected down to subcutaneous tissues, identified and protected dorsal   sensory nerves.  There was significant amount of fracture hematoma emanating   from the small rent in the extensor retinaculum.  I then incised through the   third compartment, made retinacular flaps radially into the second   compartment, ulnarly into the fifth compartment.  The posterior interosseous   nerve looked traumatized from laceration of the capsule.  For pain control, I   therefore performed a posterior interosseous neurectomy and a ligament-sparing   capsulotomy in the capsule to reveal distal radius fracture site, which was   highly comminuted with significant amount of intraarticular dorsal   comminution.  I then also noted complete disruption of  the scapholunate   ligament off the scaphoid.  I reduced the scapholunate interval with a pointed   bone reduction clamp and then placed with one 0.062 Raghav wire from the   scaphoid to the lunate.  I then placed one 2.8 mm titanium Smith and Nephew   anchor with two 2.0 FiberWire sutures, used both sutures to repair the   scapholunate ligament back to its footprint on the scaphoid.  I then brought   the sutures out through the dorsal capsule for later imbrication of the   capsule through the repair.  I then copiously irrigated the joint with normal   saline.  I then visualized the rest of the wrist.  She had also of note a TFCC   avulsion from the dorsal aspect sigmoid notch.  I used 0.062 Raghav wire   to make a bone tunnel on the dorsal aspect of the sigmoid notch exiting   dorsally.  I used a 2.0 FiberWire suture to repair the TFCC in the dorsal   radial ulnar ligaments back to the footprint here tying the knot   extraarticular.     I then used a dental pick to reduce the articular comminution and held it with   a subchondral K-wire, 0.062 Raghav wire supporting the joint line and   reduced the comminution.  I then pulled longitudinal traction on the wrist was   filled.  I was able to reduce the carpus over the radius, reduced the dorsal   displacement, reduced the radial collapse.  Therefore, I elected to use a   dorsal bridge plate.  Made a small incision over the index metacarpal.  I   split the bridge plate along the second compartment on the radius where I   exposed the bone with a Simpson elevator subperiosteally elevating periosteum and   musculature off of the bone.  I then fixed it with 4 distal screws into the   index metacarpal for longitudinal traction and ulnar and volar translation of   the distal comminuted fragments, able to reduce into 20 degrees inclination,   neutral tilt, and a 2- variance with bone reduction clamp on the plate   proximally and neutralized this with multiple nonlocking  screws, which had   excellent purchase.  I removed bone reduction clamp, cut the K wires deep to   the skin and confirmed significantly overall improved alignment without   evidence of hardware complication and copiously irrigated the joint with   normal saline.  Repaired the capsule to the scapholunate ligament with the   previously used 2.0 FiberWire sutures, repaired the capsule with 2-0 Vicryl   suture, copiously irrigated the wound with normal saline, deflated the   tourniquet, used Bovie cautery to achieve hemostasis and closed the extensor   retinaculum with all tendons in their compartments with the exception of EPL   left in subcutaneous tissue.  I then closed the skin with 2-0 Monocryl suture,   skin stapler, all covered with Xeroform, 4x4s, and a well-padded splint.  The   patient awoke from anesthesia without complication and was transferred in   stable condition to PACU where there was no immediate postoperative complaint.    POSTOPERATIVE PLAN:  Patient will be discharged home per same day criteria,   sedentary use of the fingers only, no use of the hand and follow up in 10-14   days for staple removal, wound check, and cast.  Plan is for bridge plate and   K-wire removal in approximately 3 months.       ____________________________________     MD CUBA Pedroza / SVEN    DD:  10/09/2019 15:07:29  DT:  10/09/2019 16:52:29    D#:  [id number]  Job#:  [id number]

## 2019-10-09 NOTE — ANESTHESIA PREPROCEDURE EVALUATION
63 yo w/distal radius fracture and scapholunate ligament injury    Relevant Problems   ANESTHESIA   (+) Delayed emergence from anesthesia      CARDIAC   (+) Essential hypertension         (+) CKD (chronic kidney disease), stage I   (+) Renal cyst      Other   (+) Obesity (BMI 30-39.9)     Denies CAD, CP/SOB, CVA, DM, LUNG/LIVER/KIDNEY DZ, GERD, URI    Physical Exam    Airway   Mallampati: II  TM distance: >3 FB  Neck ROM: full       Cardiovascular   Rhythm: regular  Rate: normal  (-) murmur     Dental - normal exam         Pulmonary   Breath sounds clear to auscultation     Abdominal    Neurological - normal exam                 Anesthesia Plan    ASA 2       Plan - general and peripheral nerve block     Peripheral nerve block will be post-op pain control  Airway plan will be LMA        Induction: intravenous    Postoperative Plan: Postoperative administration of opioids is intended.    Pertinent diagnostic labs and testing reviewed    Informed Consent:    Anesthetic plan and risks discussed with patient.

## 2019-10-09 NOTE — OR NURSING
Assumed care of patient from PACU, see flow sheets for vital signs. Patient alert and oriented times four. Assessment completed. Surgical incision assessed, dressing clean, dry and intact. Pain is controlled and tolerable for patient. Patient updated of plan of care for discharge. Family/friend at bedside with patient. Discharge instructions reviewed and all questions answered. All needs met, patient dressed and safe to discharge home.   Patient tolerating fluids and food with no nausea.    1540: Patient up and ambulated to bathroom to void.     1606: Patient ready to go, last vital signs in flow sheet. PIV removed. Patient taken to care in wheelchair. Patient safe to discharge.

## 2019-10-09 NOTE — ANESTHESIA PROCEDURE NOTES
Airway  Date/Time: 10/9/2019 10:49 AM  Performed by: Beulah Garrido M.D.  Authorized by: Beulah Garrido M.D.     Location:  OR  Urgency:  Elective  Indications for Airway Management:  Anesthesia  Spontaneous Ventilation: absent    Sedation Level:  Deep  Preoxygenated: Yes    Mask Difficulty Assessment:  0 - not attempted  Final Airway Type:  Supraglottic airway  Final Supraglottic Airway:  Standard LMA  SGA Size:  4  Number of Attempts at Approach:  1

## 2019-10-09 NOTE — DISCHARGE INSTRUCTIONS
ACTIVITY: Rest and take it easy for the first 24 hours.  A responsible adult is recommended to remain with you during that time.  It is normal to feel sleepy.  We encourage you to not do anything that requires balance, judgment or coordination.    MILD FLU-LIKE SYMPTOMS ARE NORMAL. YOU MAY EXPERIENCE GENERALIZED MUSCLE ACHES, THROAT IRRITATION, HEADACHE AND/OR SOME NAUSEA.    FOR 24 HOURS DO NOT:  Drive, operate machinery or run household appliances.  Drink beer or alcoholic beverages.   Make important decisions or sign legal documents.    SPECIAL INSTRUCTIONS:     Keep splint on, clean, and dry until clinic follow-up.   Elevate above heart and ice frequently for at least 2 weeks.   No heavy lifting or grasping for at least 6 weeks.   No driving while on narcotic pain medications. Contact auto-insurance carrier for clearance to begin driving again.   Call MD or come to ER for any major concerns.    DIET: To avoid nausea, slowly advance diet as tolerated, avoiding spicy or greasy foods for the first day.  Add more substantial food to your diet according to your physician's instructions. INCREASE FLUIDS AND FIBER TO AVOID CONSTIPATION.    SURGICAL DRESSING/BATHING: Keep clean and dry until follow up appointment.    FOLLOW-UP APPOINTMENT:  A follow-up appointment should be arranged with your doctor; call to schedule.    You should CALL YOUR PHYSICIAN if you develop:  Fever greater than 101 degrees F.  Pain not relieved by medication, or persistent nausea or vomiting.  Excessive bleeding (blood soaking through dressing) or unexpected drainage from the wound.  Extreme redness or swelling around the incision site, drainage of pus or foul smelling drainage.  Inability to urinate or empty your bladder within 8 hours.  Problems with breathing or chest pain.    You should call 911 if you develop problems with breathing or chest pain.  If you are unable to contact your doctor or surgical center, you should go to the nearest  emergency room or urgent care center.  Physician's telephone #: [phone]    If any questions arise, call your doctor.  If your doctor is not available, please feel free to call the Surgical Center at [phone].  The Center is open Monday through Friday from 7AM to 7PM.  You can also call the HEALTH HOTLINE open 24 hours/day, 7 days/week and speak to a nurse at [phone], or toll free at [phone].    A registered nurse may call you a few days after your surgery to see how you are doing after your procedure.    MEDICATIONS: Resume taking daily medication.  Take prescribed pain medication with food.  If no medication is prescribed, you may take non-aspirin pain medication if needed.  PAIN MEDICATION CAN BE VERY CONSTIPATING.  Take a stool softener or laxative such as senokot, pericolace, or milk of magnesia if needed.    Prescription given for Roxicodone (pain).  Last pain medication given at 1:26 pm.    If your physician has prescribed pain medication that includes Acetaminophen (Tylenol), do not take additional Acetaminophen (Tylenol) while taking the prescribed medication.    Depression / Suicide Risk    As you are discharged from this [facility] facility, it is important to learn how to keep safe from harming yourself.    Recognize the warning signs:  · Abrupt changes in personality, positive or negative- including increase in energy   · Giving away possessions  · Change in eating patterns- significant weight changes-  positive or negative  · Change in sleeping patterns- unable to sleep or sleeping all the time   · Unwillingness or inability to communicate  · Depression  · Unusual sadness, discouragement and loneliness  · Talk of wanting to die  · Neglect of personal appearance   · Rebelliousness- reckless behavior  · Withdrawal from people/activities they love  · Confusion- inability to concentrate     If you or a loved one observes any of these behaviors or has concerns about self-harm,  here's what you can do:  · Talk about it- your feelings and reasons for harming yourself  · Remove any means that you might use to hurt yourself (examples: pills, rope, extension cords, firearm)  · Get professional help from the community (Mental Health, Substance Abuse, psychological counseling)  · Do not be alone:Call your Safe Contact- someone whom you trust who will be there for you.  · Call your local CRISIS HOTLINE 650-1931 or 129-110-7359  · Call your local Children's Mobile Crisis Response Team [place] (892) 483-3430 or www.[employer] Connect  · Call the toll free National Suicide Prevention Hotlines   · National Suicide Prevention Lifeline 417-332-HHGT (5833)  · National Hope Line Network 800-SUICIDE (130-1492)

## 2019-10-09 NOTE — OR NURSING
"Patient complaining that the severity of her aching pain to her right hand/wrist/fingers have increased and is now radiating up her arm just past the level of her elbow.  Patient's right hand is mildly swollen with a purple discoloration.  Her capillary refill to both hand is brisk and less than 3 seconds.  Her fingers on her right hand(operative extremity) are actually warmer than the fingers on her left hand.  She is able to wiggle her fingers slightly to command on her right hand.    She also complaining that her hand feels ice hold cold, \"as if her hand were sitting in a bucket of ice water.\"  I have wrapped her right hand and lower arm in warm blanket.  I also have her right arm elevated with 2 pillows which I had done when she arrived from the OR at 1215.    Patient verbalized that her right hand was swollen and purple prior to the her procedure today.  The purple discoloration and swelling is not new.  Patient is awake, alert and oriented with normal speech.    I called Dr. Garrido (anesthesia) to inform her of all the details above.  New orders received for an additional 5mg of oxycodone by mouth to be given in pacu.  "

## 2019-10-09 NOTE — ANESTHESIA TIME REPORT
Anesthesia Start and Stop Event Times     Date Time Event    10/9/2019 1040 Ready for Procedure     1042 Anesthesia Start     1219 Anesthesia Stop        Responsible Staff  10/09/19    Name Role Begin End    Beulah Garrido M.D. Anesth 1042 1219        Preop Diagnosis (Free Text):  Pre-op Diagnosis     OTHER FRACTURES OF LOWER RIGHT RADIUS        Preop Diagnosis (Codes):    Post op Diagnosis  Closed fracture of right distal radius  right scapholunate ligament injury    Premium Reason  Non-Premium    Comments:

## 2019-10-09 NOTE — ANESTHESIA POSTPROCEDURE EVALUATION
Patient: Jodi Lopez    Procedure Summary     Date:  10/09/19 Room / Location:  [place] [address] / SURGERY [facility]    Anesthesia Start:  1042 Anesthesia Stop:  1219    Procedures:       ORIF, WRIST- DISTAL RADIUS (Right Wrist)      REPAIR, LIGAMENT- SCAPHOLUNATE (Right Hand) Diagnosis:  (OTHER FRACTURES OF LOWER RIGHT RADIUS)    Surgeon:  Kwabena Michelle M.D. Responsible Provider:  Beulah Garrido M.D.    Anesthesia Type:  general, peripheral nerve block ASA Status:  2          Final Anesthesia Type: general, peripheral nerve block  Last vitals  BP   Blood Pressure : 117/64    Temp   36.6 °C (97.8 °F)    Pulse   Pulse: 69   Resp   18    SpO2   94 %      Anesthesia Post Evaluation    Patient location during evaluation: PACU  Patient participation: complete - patient participated  Level of consciousness: awake  Pain score: 5 (patient rates pain 5/10 but resting comfortably.  Describes pain as heaviness which is likely a feeling secondary to block but could also be a little breakthrough pain )    Airway patency: patent  Anesthetic complications: no  Cardiovascular status: adequate  Respiratory status: acceptable  Hydration status: acceptable    PONV: none           Nurse Pain Score: 6 (NPRS)

## 2019-10-25 ENCOUNTER — HOSPITAL ENCOUNTER (EMERGENCY)
Dept: HOSPITAL 8 - ED | Age: 64
Discharge: HOME | End: 2019-10-25
Payer: MEDICARE

## 2019-10-25 VITALS — SYSTOLIC BLOOD PRESSURE: 150 MMHG | DIASTOLIC BLOOD PRESSURE: 66 MMHG

## 2019-10-25 VITALS — WEIGHT: 211.64 LBS | BODY MASS INDEX: 34.01 KG/M2 | HEIGHT: 66 IN
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
## 2019-10-25 DIAGNOSIS — S39.012A: Primary | ICD-10-CM
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
## 2019-10-25 PROCEDURE — 96374 THER/PROPH/DIAG INJ IV PUSH: CPT

## 2019-10-25 PROCEDURE — 99283 EMERGENCY DEPT VISIT LOW MDM: CPT

## 2019-10-25 NOTE — NUR
pt able to ambulate steadily to bathroom, however with severe pain.  orders for 
meds placed, med given.  pt spouse here to take pt home.

## 2020-02-17 ENCOUNTER — HOSPITAL ENCOUNTER (OUTPATIENT)
Dept: HOSPITAL 8 - RAD | Age: 65
Discharge: HOME | End: 2020-02-17
Attending: FAMILY MEDICINE
Payer: MEDICARE

## 2020-02-17 DIAGNOSIS — N39.0: ICD-10-CM

## 2020-02-17 DIAGNOSIS — R31.9: ICD-10-CM

## 2020-02-17 DIAGNOSIS — N28.1: Primary | ICD-10-CM

## 2020-02-17 DIAGNOSIS — R10.9: ICD-10-CM

## 2020-02-17 PROCEDURE — 76770 US EXAM ABDO BACK WALL COMP: CPT

## 2020-05-21 ENCOUNTER — HOSPITAL ENCOUNTER (OUTPATIENT)
Dept: HOSPITAL 8 - STAR | Age: 65
Discharge: HOME | End: 2020-05-21
Attending: ORTHOPAEDIC SURGERY
Payer: MEDICARE

## 2020-05-21 DIAGNOSIS — M17.11: ICD-10-CM

## 2020-05-21 DIAGNOSIS — M25.561: ICD-10-CM

## 2020-05-21 DIAGNOSIS — I44.4: ICD-10-CM

## 2020-05-21 DIAGNOSIS — Z01.818: Primary | ICD-10-CM

## 2020-05-21 DIAGNOSIS — I45.19: ICD-10-CM

## 2020-05-21 PROCEDURE — 93005 ELECTROCARDIOGRAM TRACING: CPT

## 2020-06-01 ENCOUNTER — OFFICE VISIT (OUTPATIENT)
Dept: ADMISSIONS | Facility: MEDICAL CENTER | Age: 65
End: 2020-06-01
Attending: ORTHOPAEDIC SURGERY
Payer: MEDICARE

## 2020-06-01 DIAGNOSIS — Z01.812 PRE-OPERATIVE LABORATORY EXAMINATION: ICD-10-CM

## 2020-06-01 LAB — COVID ORDER STATUS COVID19: NORMAL

## 2020-06-01 PROCEDURE — C9803 HOPD COVID-19 SPEC COLLECT: HCPCS

## 2020-06-02 DIAGNOSIS — Z01.812 PRE-OPERATIVE LABORATORY EXAMINATION: ICD-10-CM

## 2020-06-02 LAB
ANION GAP SERPL CALC-SCNC: 13 MMOL/L (ref 7–16)
BUN SERPL-MCNC: 18 MG/DL (ref 8–22)
CALCIUM SERPL-MCNC: 9.2 MG/DL (ref 8.5–10.5)
CHLORIDE SERPL-SCNC: 103 MMOL/L (ref 96–112)
CO2 SERPL-SCNC: 24 MMOL/L (ref 20–33)
CREAT SERPL-MCNC: 0.65 MG/DL (ref 0.5–1.4)
ERYTHROCYTE [DISTWIDTH] IN BLOOD BY AUTOMATED COUNT: 45.1 FL (ref 35.9–50)
GLUCOSE SERPL-MCNC: 85 MG/DL (ref 65–99)
HCT VFR BLD AUTO: 44.4 % (ref 37–47)
HGB BLD-MCNC: 14.5 G/DL (ref 12–16)
MCH RBC QN AUTO: 30.4 PG (ref 27–33)
MCHC RBC AUTO-ENTMCNC: 32.7 G/DL (ref 33.6–35)
MCV RBC AUTO: 93.1 FL (ref 81.4–97.8)
PLATELET # BLD AUTO: 299 K/UL (ref 164–446)
PMV BLD AUTO: 11.7 FL (ref 9–12.9)
POTASSIUM SERPL-SCNC: 4 MMOL/L (ref 3.6–5.5)
RBC # BLD AUTO: 4.77 M/UL (ref 4.2–5.4)
SODIUM SERPL-SCNC: 140 MMOL/L (ref 135–145)
WBC # BLD AUTO: 7.5 K/UL (ref 4.8–10.8)

## 2020-06-02 PROCEDURE — 80048 BASIC METABOLIC PNL TOTAL CA: CPT

## 2020-06-02 PROCEDURE — 85027 COMPLETE CBC AUTOMATED: CPT

## 2020-06-02 PROCEDURE — 36415 COLL VENOUS BLD VENIPUNCTURE: CPT

## 2020-06-02 RX ORDER — KETOCONAZOLE 20 MG/ML
SHAMPOO TOPICAL
COMMUNITY

## 2020-06-02 RX ORDER — CYANOCOBALAMIN (VITAMIN B-12) 5000 MCG
TABLET,DISINTEGRATING ORAL
COMMUNITY
End: 2023-04-25

## 2020-06-02 RX ORDER — ASCORBIC ACID 500 MG
500 TABLET ORAL DAILY
COMMUNITY

## 2020-06-02 RX ORDER — UBIDECARENONE 100 MG
CAPSULE ORAL DAILY
COMMUNITY

## 2020-06-02 RX ORDER — BIOTIN 10 MG
1 TABLET ORAL DAILY
COMMUNITY

## 2020-06-04 ENCOUNTER — ANESTHESIA EVENT (OUTPATIENT)
Dept: SURGERY | Facility: MEDICAL CENTER | Age: 65
End: 2020-06-04
Payer: MEDICARE

## 2020-06-04 ENCOUNTER — HOSPITAL ENCOUNTER (OUTPATIENT)
Facility: MEDICAL CENTER | Age: 65
End: 2020-06-04
Attending: ORTHOPAEDIC SURGERY | Admitting: ORTHOPAEDIC SURGERY
Payer: MEDICARE

## 2020-06-04 ENCOUNTER — ANESTHESIA (OUTPATIENT)
Dept: SURGERY | Facility: MEDICAL CENTER | Age: 65
End: 2020-06-04
Payer: MEDICARE

## 2020-06-04 VITALS
WEIGHT: 224.65 LBS | TEMPERATURE: 96.6 F | HEART RATE: 59 BPM | SYSTOLIC BLOOD PRESSURE: 131 MMHG | BODY MASS INDEX: 36.1 KG/M2 | RESPIRATION RATE: 16 BRPM | OXYGEN SATURATION: 89 % | HEIGHT: 66 IN | DIASTOLIC BLOOD PRESSURE: 60 MMHG

## 2020-06-04 DIAGNOSIS — G89.18 POSTOPERATIVE PAIN: ICD-10-CM

## 2020-06-04 LAB
SARS-COV-2 RNA RESP QL NAA+PROBE: NOT DETECTED
SPECIMEN SOURCE: NORMAL

## 2020-06-04 PROCEDURE — 160041 HCHG SURGERY MINUTES - EA ADDL 1 MIN LEVEL 4: Performed by: ORTHOPAEDIC SURGERY

## 2020-06-04 PROCEDURE — 500878 HCHG PACK, ARTHROSCOPY: Performed by: ORTHOPAEDIC SURGERY

## 2020-06-04 PROCEDURE — 160046 HCHG PACU - 1ST 60 MINS PHASE II: Performed by: ORTHOPAEDIC SURGERY

## 2020-06-04 PROCEDURE — 160025 RECOVERY II MINUTES (STATS): Performed by: ORTHOPAEDIC SURGERY

## 2020-06-04 PROCEDURE — A9270 NON-COVERED ITEM OR SERVICE: HCPCS | Performed by: ORTHOPAEDIC SURGERY

## 2020-06-04 PROCEDURE — 160002 HCHG RECOVERY MINUTES (STAT): Performed by: ORTHOPAEDIC SURGERY

## 2020-06-04 PROCEDURE — 160048 HCHG OR STATISTICAL LEVEL 1-5: Performed by: ORTHOPAEDIC SURGERY

## 2020-06-04 PROCEDURE — 160035 HCHG PACU - 1ST 60 MINS PHASE I: Performed by: ORTHOPAEDIC SURGERY

## 2020-06-04 PROCEDURE — 700105 HCHG RX REV CODE 258: Performed by: ORTHOPAEDIC SURGERY

## 2020-06-04 PROCEDURE — 160029 HCHG SURGERY MINUTES - 1ST 30 MINS LEVEL 4: Performed by: ORTHOPAEDIC SURGERY

## 2020-06-04 PROCEDURE — 700111 HCHG RX REV CODE 636 W/ 250 OVERRIDE (IP): Performed by: ANESTHESIOLOGY

## 2020-06-04 PROCEDURE — 700111 HCHG RX REV CODE 636 W/ 250 OVERRIDE (IP): Performed by: ORTHOPAEDIC SURGERY

## 2020-06-04 PROCEDURE — 700102 HCHG RX REV CODE 250 W/ 637 OVERRIDE(OP): Performed by: ANESTHESIOLOGY

## 2020-06-04 PROCEDURE — 700102 HCHG RX REV CODE 250 W/ 637 OVERRIDE(OP): Performed by: ORTHOPAEDIC SURGERY

## 2020-06-04 PROCEDURE — A6454 SELF-ADHER BAND W>=3" <5"/YD: HCPCS | Performed by: ORTHOPAEDIC SURGERY

## 2020-06-04 PROCEDURE — A9270 NON-COVERED ITEM OR SERVICE: HCPCS | Performed by: ANESTHESIOLOGY

## 2020-06-04 PROCEDURE — 160009 HCHG ANES TIME/MIN: Performed by: ORTHOPAEDIC SURGERY

## 2020-06-04 PROCEDURE — 160036 HCHG PACU - EA ADDL 30 MINS PHASE I: Performed by: ORTHOPAEDIC SURGERY

## 2020-06-04 PROCEDURE — 501838 HCHG SUTURE GENERAL: Performed by: ORTHOPAEDIC SURGERY

## 2020-06-04 RX ORDER — METOPROLOL TARTRATE 1 MG/ML
1 INJECTION, SOLUTION INTRAVENOUS
Status: DISCONTINUED | OUTPATIENT
Start: 2020-06-04 | End: 2020-06-04 | Stop reason: HOSPADM

## 2020-06-04 RX ORDER — ROPIVACAINE HYDROCHLORIDE 5 MG/ML
INJECTION, SOLUTION EPIDURAL; INFILTRATION; PERINEURAL
Status: DISCONTINUED | OUTPATIENT
Start: 2020-06-04 | End: 2020-06-04 | Stop reason: HOSPADM

## 2020-06-04 RX ORDER — ONDANSETRON 2 MG/ML
4 INJECTION INTRAMUSCULAR; INTRAVENOUS
Status: DISCONTINUED | OUTPATIENT
Start: 2020-06-04 | End: 2020-06-04 | Stop reason: HOSPADM

## 2020-06-04 RX ORDER — OXYCODONE HCL 5 MG/5 ML
10 SOLUTION, ORAL ORAL
Status: COMPLETED | OUTPATIENT
Start: 2020-06-04 | End: 2020-06-04

## 2020-06-04 RX ORDER — OXYCODONE HCL 5 MG/5 ML
5 SOLUTION, ORAL ORAL
Status: COMPLETED | OUTPATIENT
Start: 2020-06-04 | End: 2020-06-04

## 2020-06-04 RX ORDER — HYDROMORPHONE HYDROCHLORIDE 1 MG/ML
0.6 INJECTION, SOLUTION INTRAMUSCULAR; INTRAVENOUS; SUBCUTANEOUS
Status: DISCONTINUED | OUTPATIENT
Start: 2020-06-04 | End: 2020-06-04 | Stop reason: HOSPADM

## 2020-06-04 RX ORDER — HYDRALAZINE HYDROCHLORIDE 20 MG/ML
5 INJECTION INTRAMUSCULAR; INTRAVENOUS
Status: DISCONTINUED | OUTPATIENT
Start: 2020-06-04 | End: 2020-06-04 | Stop reason: HOSPADM

## 2020-06-04 RX ORDER — MEPERIDINE HYDROCHLORIDE 25 MG/ML
6.25 INJECTION INTRAMUSCULAR; INTRAVENOUS; SUBCUTANEOUS
Status: DISCONTINUED | OUTPATIENT
Start: 2020-06-04 | End: 2020-06-04 | Stop reason: HOSPADM

## 2020-06-04 RX ORDER — DEXAMETHASONE SODIUM PHOSPHATE 4 MG/ML
INJECTION, SOLUTION INTRA-ARTICULAR; INTRALESIONAL; INTRAMUSCULAR; INTRAVENOUS; SOFT TISSUE PRN
Status: DISCONTINUED | OUTPATIENT
Start: 2020-06-04 | End: 2020-06-04 | Stop reason: SURG

## 2020-06-04 RX ORDER — HALOPERIDOL 5 MG/ML
1 INJECTION INTRAMUSCULAR
Status: DISCONTINUED | OUTPATIENT
Start: 2020-06-04 | End: 2020-06-04 | Stop reason: HOSPADM

## 2020-06-04 RX ORDER — OXYCODONE AND ACETAMINOPHEN 10; 325 MG/1; MG/1
1 TABLET ORAL EVERY 4 HOURS PRN
Qty: 15 TAB | Refills: 0 | Status: SHIPPED | OUTPATIENT
Start: 2020-06-04 | End: 2020-06-07

## 2020-06-04 RX ORDER — SODIUM CHLORIDE, SODIUM LACTATE, POTASSIUM CHLORIDE, CALCIUM CHLORIDE 600; 310; 30; 20 MG/100ML; MG/100ML; MG/100ML; MG/100ML
INJECTION, SOLUTION INTRAVENOUS CONTINUOUS
Status: DISCONTINUED | OUTPATIENT
Start: 2020-06-04 | End: 2020-06-04 | Stop reason: HOSPADM

## 2020-06-04 RX ORDER — HYDROMORPHONE HYDROCHLORIDE 1 MG/ML
0.4 INJECTION, SOLUTION INTRAMUSCULAR; INTRAVENOUS; SUBCUTANEOUS
Status: DISCONTINUED | OUTPATIENT
Start: 2020-06-04 | End: 2020-06-04 | Stop reason: HOSPADM

## 2020-06-04 RX ORDER — ESTRADIOL 0.1 MG/G
CREAM VAGINAL
COMMUNITY
Start: 2020-05-07 | End: 2023-04-25

## 2020-06-04 RX ORDER — CEFAZOLIN SODIUM 1 G/3ML
INJECTION, POWDER, FOR SOLUTION INTRAMUSCULAR; INTRAVENOUS PRN
Status: DISCONTINUED | OUTPATIENT
Start: 2020-06-04 | End: 2020-06-04 | Stop reason: SURG

## 2020-06-04 RX ORDER — DIPHENHYDRAMINE HYDROCHLORIDE 50 MG/ML
12.5 INJECTION INTRAMUSCULAR; INTRAVENOUS
Status: DISCONTINUED | OUTPATIENT
Start: 2020-06-04 | End: 2020-06-04 | Stop reason: HOSPADM

## 2020-06-04 RX ORDER — HYDROMORPHONE HYDROCHLORIDE 1 MG/ML
0.2 INJECTION, SOLUTION INTRAMUSCULAR; INTRAVENOUS; SUBCUTANEOUS
Status: DISCONTINUED | OUTPATIENT
Start: 2020-06-04 | End: 2020-06-04 | Stop reason: HOSPADM

## 2020-06-04 RX ORDER — ONDANSETRON 2 MG/ML
INJECTION INTRAMUSCULAR; INTRAVENOUS PRN
Status: DISCONTINUED | OUTPATIENT
Start: 2020-06-04 | End: 2020-06-04 | Stop reason: SURG

## 2020-06-04 RX ADMIN — POVIDONE-IODINE 15 ML: 10 SOLUTION TOPICAL at 08:50

## 2020-06-04 RX ADMIN — OXYCODONE HYDROCHLORIDE 5 MG: 5 SOLUTION ORAL at 12:01

## 2020-06-04 RX ADMIN — FENTANYL CITRATE 25 MCG: 50 INJECTION INTRAMUSCULAR; INTRAVENOUS at 12:17

## 2020-06-04 RX ADMIN — FENTANYL CITRATE 100 MCG: 50 INJECTION INTRAMUSCULAR; INTRAVENOUS at 10:41

## 2020-06-04 RX ADMIN — FENTANYL CITRATE 25 MCG: 50 INJECTION INTRAMUSCULAR; INTRAVENOUS at 12:09

## 2020-06-04 RX ADMIN — DEXAMETHASONE SODIUM PHOSPHATE 4 MG: 4 INJECTION, SOLUTION INTRA-ARTICULAR; INTRALESIONAL; INTRAMUSCULAR; INTRAVENOUS; SOFT TISSUE at 11:16

## 2020-06-04 RX ADMIN — FENTANYL CITRATE 50 MCG: 50 INJECTION INTRAMUSCULAR; INTRAVENOUS at 11:23

## 2020-06-04 RX ADMIN — FENTANYL CITRATE 50 MCG: 50 INJECTION INTRAMUSCULAR; INTRAVENOUS at 11:15

## 2020-06-04 RX ADMIN — PROPOFOL 200 MG: 10 INJECTION, EMULSION INTRAVENOUS at 10:41

## 2020-06-04 RX ADMIN — SODIUM CHLORIDE, POTASSIUM CHLORIDE, SODIUM LACTATE AND CALCIUM CHLORIDE: 600; 310; 30; 20 INJECTION, SOLUTION INTRAVENOUS at 08:38

## 2020-06-04 RX ADMIN — CEFAZOLIN 2 G: 330 INJECTION, POWDER, FOR SOLUTION INTRAMUSCULAR; INTRAVENOUS at 10:41

## 2020-06-04 RX ADMIN — ONDANSETRON 4 MG: 2 INJECTION INTRAMUSCULAR; INTRAVENOUS at 11:16

## 2020-06-04 ASSESSMENT — PAIN SCALES - GENERAL: PAIN_LEVEL: 2

## 2020-06-04 NOTE — ANESTHESIA TIME REPORT
Anesthesia Start and Stop Event Times     Date Time Event    6/4/2020 1024 Ready for Procedure     1041 Anesthesia Start     1137 Anesthesia Stop        Responsible Staff  06/04/20    Name Role Begin End    Kyle Dyson M.D. Anesth 1041 1137        Preop Diagnosis (Free Text):  Pre-op Diagnosis     OA OF RIGHT KNEE        Preop Diagnosis (Codes):    Post op Diagnosis  Medial meniscus tear  medial and lateral meniscal tear    Premium Reason  Non-Premium    Comments: Kiel

## 2020-06-04 NOTE — OR NURSING
Pt arrived from PACU at 1255. Incision site noted clean dry and intact. Pt mobilized from gurney to chair. Pt vomited once during mobilization, Pt states nausea resolved after vomiting. Pt states pain is at 5/10 which is tolerable. Vital signs stable. Pts Sunita Monte called and updated on patients situation.

## 2020-06-04 NOTE — ANESTHESIA POSTPROCEDURE EVALUATION
Patient: Jodi Lopez    Procedure Summary     Date:  06/04/20 Room / Location:  [facility] 12 / SURGERY [facility]    Anesthesia Start:  1041 Anesthesia Stop:  1137    Procedures:       ARTHROSCOPY, KNEE (Right Knee)      MENISCECTOMY, KNEE, MEDIAL,LATERAL (Right Knee) Diagnosis:  (OA OF RIGHT KNEE)    Surgeon:  Valentín Draper M.D. Responsible Provider:  Kyle Dyson M.D.    Anesthesia Type:  general ASA Status:  2          Final Anesthesia Type: general  Last vitals  BP   Blood Pressure : 137/87    Temp   36.7 °C (98.1 °F)    Pulse   Pulse: 79   Resp   15    SpO2   94 %      Anesthesia Post Evaluation    Patient location during evaluation: PACU  Patient participation: complete - patient participated  Level of consciousness: awake and alert  Pain score: 2    Airway patency: patent  Anesthetic complications: no  Cardiovascular status: hemodynamically stable  Respiratory status: acceptable  Hydration status: euvolemic    PONV: none           Nurse Pain Score: 0 (NPRS)

## 2020-06-04 NOTE — ANESTHESIA PROCEDURE NOTES
Airway    Date/Time: 6/4/2020 10:43 AM  Performed by: Kyle Dyson M.D.  Authorized by: Kyle Dyson M.D.     Location:  OR  Urgency:  Elective  Indications for Airway Management:  Anesthesia      Spontaneous Ventilation: absent    Sedation Level:  Deep  Preoxygenated: Yes    Final Airway Type:  Supraglottic airway  Final Supraglottic Airway:  Standard LMA    SGA Size:  4  Number of Attempts at Approach:  1

## 2020-06-04 NOTE — DISCHARGE INSTRUCTIONS
ACTIVITY: Rest and take it easy for the first 24 hours.  A responsible adult is recommended to remain with you during that time.  It is normal to feel sleepy.  We encourage you to not do anything that requires balance, judgment or coordination.    MILD FLU-LIKE SYMPTOMS ARE NORMAL. YOU MAY EXPERIENCE GENERALIZED MUSCLE ACHES, THROAT IRRITATION, HEADACHE AND/OR SOME NAUSEA.    FOR 24 HOURS DO NOT:  Drive, operate machinery or run household appliances.  Drink beer or alcoholic beverages.   Make important decisions or sign legal documents.    SPECIAL INSTRUCTIONS:  Ice elevation next 24 hours.    DIET: To avoid nausea, slowly advance diet as tolerated, avoiding spicy or greasy foods for the first day.  Add more substantial food to your diet according to your physician's instructions.  Babies can be fed formula or breast milk as soon as they are hungry.  INCREASE FLUIDS AND FIBER TO AVOID CONSTIPATION.    SURGICAL DRESSING/BATHING:   Keep dressing clean/dry/intact.  Ice and elevate for the next 24 hours.    FOLLOW-UP APPOINTMENT:  A follow-up appointment should be arranged with your doctor in 1-2 weeks; call to schedule.    You should CALL YOUR PHYSICIAN if you develop:  Fever greater than 101 degrees F.  Pain not relieved by medication, or persistent nausea or vomiting.  Excessive bleeding (blood soaking through dressing) or unexpected drainage from the wound.  Extreme redness or swelling around the incision site, drainage of pus or foul smelling drainage.  Inability to urinate or empty your bladder within 8 hours.  Problems with breathing or chest pain.    You should call 911 if you develop problems with breathing or chest pain.  If you are unable to contact your doctor or surgical center, you should go to the nearest emergency room or urgent care center.  Physician's telephone #: Dr. Draper [phone]    If any questions arise, call your doctor.  If your doctor is not available, please feel free to call the Surgical  Center at [phone].  The Center is open Monday through Friday from 7AM to 7PM.  You can also call the HEALTH HOTLINE open 24 hours/day, 7 days/week and speak to a nurse at [phone], or toll free at [phone].    A registered nurse may call you a few days after your surgery to see how you are doing after your procedure.    MEDICATIONS: Resume taking daily medication.  Take prescribed pain medication with food.  If no medication is prescribed, you may take non-aspirin pain medication if needed.  PAIN MEDICATION CAN BE VERY CONSTIPATING.  Take a stool softener or laxative such as senokot, pericolace, or milk of magnesia if needed.    Prescription given for Percocet.  Last pain medication given at 12:01 PM, you may take your next dose at 4:01 PM.    If your physician has prescribed pain medication that includes Acetaminophen (Tylenol), do not take additional Acetaminophen (Tylenol) while taking the prescribed medication.    Depression / Suicide Risk    As you are discharged from this [facility] facility, it is important to learn how to keep safe from harming yourself.    Recognize the warning signs:  · Abrupt changes in personality, positive or negative- including increase in energy   · Giving away possessions  · Change in eating patterns- significant weight changes-  positive or negative  · Change in sleeping patterns- unable to sleep or sleeping all the time   · Unwillingness or inability to communicate  · Depression  · Unusual sadness, discouragement and loneliness  · Talk of wanting to die  · Neglect of personal appearance   · Rebelliousness- reckless behavior  · Withdrawal from people/activities they love  · Confusion- inability to concentrate     If you or a loved one observes any of these behaviors or has concerns about self-harm, here's what you can do:  · Talk about it- your feelings and reasons for harming yourself  · Remove any means that you might use to hurt yourself (examples: pills,  rope, extension cords, firearm)  · Get professional help from the community (Mental Health, Substance Abuse, psychological counseling)  · Do not be alone:Call your Safe Contact- someone whom you trust who will be there for you.  · Call your local CRISIS HOTLINE 983-5206 or 345-567-9515  · Call your local Children's Mobile Crisis Response Team [place] (740) 901-1906 or www.[employer]  · Call the toll free National Suicide Prevention Hotlines   · National Suicide Prevention Lifeline 337-033-LQGX (5018)  · National Hope Line Network 800-SUICIDE (914-1873)

## 2020-06-04 NOTE — OR NURSING
Discharge instructions discussed with patient at bedside. Pt verbalizes understanding. Pt changed back into clothes, all belongings given to patient. Pt taken to bathroom via wheelchair. Pt able to void without issue. Pt discharged via wheelchair to home with .

## 2020-06-04 NOTE — ANESTHESIA QCDR
2019 [facility] Clinical Data Registry (for Quality Improvement)     Postoperative nausea/vomiting risk protocol (Adult = 18 yrs and Pediatric 3-17 yrs)- (430 and 463)  General inhalation anesthetic (NOT TIVA) with PONV risk factors: Yes  Provision of anti-emetic therapy with at least 2 different classes of agents: Yes   Patient DID NOT receive anti-emetic therapy and reason is documented in Medical Record:  N/A    Multimodal Pain Management- (477)  Non-emergent surgery AND patient age >= 18:   Use of Multimodal Pain Management, two or more drugs and/or interventions, NOT including systemic opioids:   Exception: Documented allergy to multiple classes of analgesics:     Smoking Abstinence (404)  Patient is current smoker (cigarette, pipe, e-cig, marijuanna):   Elective Surgery:   Abstinence instructions provided prior to day of surgery:   Patient abstained from smoking on day of surgery:     Pre-Op Beta-Blocker in Isolated CABG (44)  Isolated CABG AND patient age >= 18:   Beta-blocker admin within 24 hours of surgical incision:   Exception:of medical reason(s) for not administering beta blocker within 24 hours prior to surgical incision (e.g., not  indicated,other medical reason):     PACU assessment of acute postoperative pain prior to Anesthesia Care End- Applies to Patients Age = 18- (ABG7)  Initial PACU pain score is which of the following: < 7/10  Patient unable to report pain score: N/A    Post-anesthetic transfer of care checklist/protocol to PACU/ICU- (426 and 427)  Upon conclusion of case, patient transferred to which of the following locations: PACU/Non-ICU  Use of transfer checklist/protocol:   Exclusion: Service Performed in Patient Hospital Room (and thus did not require transfer):   Unplanned admission to ICU related to anesthesia service up through end of PACU care- (MD51)  Unplanned admission to ICU (not initially anticipated at anesthesia start time): No

## 2020-06-04 NOTE — PROGRESS NOTES
Ortho preoperative note  Patient seen and examined right knee arthroscopy  rba all explained  Mri positive for meniscus tear.   Baron

## 2020-06-04 NOTE — OP REPORT
DATE OF SERVICE:  06/04/2020    PREOPERATIVE DIAGNOSIS:  Medial meniscus tear.    POSTOPERATIVE DIAGNOSIS:  Medial meniscus tear.    OPERATIONS PERFORMED:  1.  Partial arthroscopic medial meniscectomy.  2.  Partial arthroscopic lateral meniscectomy.  3.  Patellofemoral debridement, arthroscopic debridement.    OPERATIVE FINDINGS:  Macerated tear of the posterior horn of the medial   meniscus from approximately 2 o'clock to the posterior root.  Macerated tear   of the lateral meniscus in the posterior horn and patellofemoral   chondromalacia.    SURGEON:  Valentín Draper MD    ASSISTANT:  Unassisted.    INDICATIONS FOR THE OPERATION:  Pain, failed conservative management, positive   imaging studies.    PREOPERATIVE CONSENT AND FAMILY CONFERENCE:  The patient was seen today   preoperatively.  Risks, benefits, alternatives were explained.  Patient   consented for full surgical undertaking.    COMPLICATIONS:  None.    OPERATION:  The patient was brought to the operating room, awake, alert,   placed on the operating room table in supine position.  The right lower   extremity was shaved, scrubbed, prepped and draped in normal sterile routine   fashion.  Tourniquet was utilized.  The leg was draped over the edge of the   bed with a mid femoral countertraction post.    Using routine 3-portal arthroscopy and photographs, the medial compartment was   entered.  There was found to be a macerated tear from approximately 3 o'clock   up to 12 o'clock.  Using combination of baskets and leon, we simply   trimmed the meniscus back to stable meniscal synovial junction.  There was   significant articular injury to the femoral side due to the contact with the   unstable meniscus.  The anterior horn, ACL and PCL were all identified and   found to be normal as well as the lateral gutter.    The lateral meniscus was then identified and also found to have posterior   tear, which was also cleaned and shaped to a stable meniscal synovial    junction.  The lateral gutter was normal.  Patellofemoral joint had   significant frons.  We simply used the shaver and trimmed that back.  The   suprapatellar pouch was otherwise normal.  I looked in both gutters.  Again,   there was no pathology.  No other abnormality.  There was no indication to   look posteriorly.    Now, the knee was copiously irrigated.  The portals were closed with nylon,   injected with Marcaine, sterile compression dressing, and the patient was   taken to recovery room in stable condition.  No intraoperative or immediate   postoperative complications.  Patient tolerated the procedure well.       ____________________________________     MD CONENR CRONIN / SVEN    DD:  06/04/2020 11:32:51  DT:  06/04/2020 12:49:20    D#:  [id number]  Job#:  [id number]

## 2020-06-04 NOTE — OR SURGEON
Immediate Post OP Note    PreOp Diagnosis: meniscus tear left knee    PostOp Diagnosis: same    Procedure(s):  ARTHROSCOPY, KNEE - Wound Class: Clean  MENISCECTOMY, KNEE, MEDIAL,LATERAL - Wound Class: Clean    Surgeon(s):  Valentín Draper M.D.    Anesthesiologist/Type of Anesthesia:  Anesthesiologist: Kyle Dyson M.D./General    Surgical Staff:  Circulator: Digna Chandra R.N.; Grace Mercado R.N.  Scrub Person: Asha Gao    Specimens removed if any:  * No specimens in log *    Estimated Blood Loss: none, torniquet    Findings: as described    Complications: none        6/4/2020 11:35 AM Valentín Draper M.D.

## 2020-06-04 NOTE — PROGRESS NOTES
Ortho  Patient seen and examined pre operatively  Right knee   Reviewed all, answered all questions  Sheryl Draper

## 2020-06-04 NOTE — ANESTHESIA PREPROCEDURE EVALUATION
Relevant Problems   ANESTHESIA   (+) Delayed emergence from anesthesia      CARDIAC   (+) Essential hypertension   (+) Hemorrhoids   (+) Varicose vein         (+) CKD (chronic kidney disease), stage I   (+) Renal cyst       Physical Exam    Airway   Mallampati: II  TM distance: >3 FB  Neck ROM: full       Cardiovascular - normal exam  Rhythm: regular  Rate: normal  (-) murmur     Dental - normal exam           Pulmonary - normal exam  Breath sounds clear to auscultation     Abdominal    Neurological - normal exam                 Anesthesia Plan    ASA 2       Plan - general       Airway plan will be LMA        Induction: intravenous    Postoperative Plan: Postoperative administration of opioids is intended.    Pertinent diagnostic labs and testing reviewed    Informed Consent:    Anesthetic plan and risks discussed with patient.    Use of blood products discussed with: patient whom consented to blood products.

## 2020-06-04 NOTE — OR NURSING
Patient AAOx4, calm, c/o right knee surgical pain 5-9/10 post op, down to 4/10 with oral and IV pain medication. Patient resting comfortably. VSS, afebrile. Right leg elevated, dressing CDI, CMS intact. Tolerating water and ginerale without nausea.  notified of status and plan of care.

## 2020-06-09 ENCOUNTER — HOSPITAL ENCOUNTER (EMERGENCY)
Dept: HOSPITAL 8 - ED | Age: 65
Discharge: HOME | End: 2020-06-09
Payer: MEDICARE

## 2020-06-09 VITALS — HEIGHT: 66 IN | BODY MASS INDEX: 36.99 KG/M2 | WEIGHT: 230.16 LBS

## 2020-06-09 VITALS — SYSTOLIC BLOOD PRESSURE: 145 MMHG | DIASTOLIC BLOOD PRESSURE: 81 MMHG

## 2020-06-09 DIAGNOSIS — Z86.718: ICD-10-CM

## 2020-06-09 DIAGNOSIS — S86.911A: Primary | ICD-10-CM
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
## 2020-06-09 PROCEDURE — 99284 EMERGENCY DEPT VISIT MOD MDM: CPT

## 2020-06-09 NOTE — NUR
ASSUMED CARE OF PT AT THIS FROM TRIAGE. TAKEN TO ROOM VIA WHEELCHAIR. PT ABLE 
TO STAND AND TRANSFER SELF TO BED. LORETO ARNOLD AT BEDSIDE FOR EVALUATION. 66 Y/O 
F PRESENTS STATING "RIGHT KNEE MENISCUS REPAIR ON 6/4, LAST 4 DAYS NOTICED PAIN 
WHEN I WALK AND SOME SWELLING, MAYBE A WARM SPOT THIS MORNING, I CAME IN TO 
MAKE SURE IT IS NOT A BLOOD CLOT. HAD ONE 40 YEARS." CMS INTACT. PEDAL PULSE 
NORMAL AND STRONG. SLIGHT SWELLING NOTED TO RIGHT CALF, NO WARMTH OR ERYTHEMA 
NOTED. 3 SURGICAL INCISIONS WITH SUTURES IN PLACE TO RIGHT KNEE, EDGES WELL 
APPROXIMATED, MINIMAL REDNESS AT SITES, CDI, NO OOZING OR DISCHARGE NOTED. CONT 
PULSE OX, BP MONITORS APPLIED. VSS. CALL LIGHT IN REACH. FALL PRECAUTIONS IN 
PLACE. A&OX4. SPOUSE AT BEDSIDE. DENIES ANY CP, SOB OR PAIN AT THIS TIME. "THE 
PAIN IS ONLY SOMETIMES WHEN I WALK."

## 2020-06-09 NOTE — NUR
PT RESTING COMFORTABLY. DENIES ANY PAIN AND NEED TO USE RESTROOM. US REMAINS AT 
BEDSIDE. VSS. CALL LIGHT IN REACH. FALL PRECAUTIONS IN PLACE.

## 2021-02-23 ENCOUNTER — HOSPITAL ENCOUNTER (OUTPATIENT)
Dept: HOSPITAL 8 - ED | Age: 66
Setting detail: OBSERVATION
LOS: 1 days | Discharge: HOME | End: 2021-02-24
Attending: INTERNAL MEDICINE | Admitting: INTERNAL MEDICINE
Payer: MEDICARE

## 2021-02-23 VITALS — DIASTOLIC BLOOD PRESSURE: 87 MMHG | SYSTOLIC BLOOD PRESSURE: 136 MMHG

## 2021-02-23 VITALS — SYSTOLIC BLOOD PRESSURE: 137 MMHG | DIASTOLIC BLOOD PRESSURE: 83 MMHG

## 2021-02-23 VITALS — HEIGHT: 66 IN | WEIGHT: 235.67 LBS | BODY MASS INDEX: 37.88 KG/M2

## 2021-02-23 DIAGNOSIS — R07.89: Primary | ICD-10-CM

## 2021-02-23 DIAGNOSIS — Z88.0: ICD-10-CM

## 2021-02-23 DIAGNOSIS — K21.9: ICD-10-CM

## 2021-02-23 DIAGNOSIS — I10: ICD-10-CM

## 2021-02-23 DIAGNOSIS — Z79.899: ICD-10-CM

## 2021-02-23 DIAGNOSIS — E66.9: ICD-10-CM

## 2021-02-23 LAB
ALBUMIN SERPL-MCNC: 3.6 G/DL (ref 3.4–5)
ANION GAP SERPL CALC-SCNC: 6 MMOL/L (ref 5–15)
BASOPHILS # BLD AUTO: 0.1 X10^3/UL (ref 0–0.1)
BASOPHILS NFR BLD AUTO: 1 % (ref 0–1)
CALCIUM SERPL-MCNC: 8.8 MG/DL (ref 8.5–10.1)
CHLORIDE SERPL-SCNC: 107 MMOL/L (ref 98–107)
CREAT SERPL-MCNC: 0.66 MG/DL (ref 0.55–1.02)
EOSINOPHIL # BLD AUTO: 0.2 X10^3/UL (ref 0–0.4)
EOSINOPHIL NFR BLD AUTO: 2 % (ref 1–7)
ERYTHROCYTE [DISTWIDTH] IN BLOOD BY AUTOMATED COUNT: 14.2 % (ref 9.6–15.2)
LYMPHOCYTES # BLD AUTO: 1.8 X10^3/UL (ref 1–3.4)
LYMPHOCYTES NFR BLD AUTO: 25 % (ref 22–44)
MCH RBC QN AUTO: 30.3 PG (ref 27–34.8)
MCHC RBC AUTO-ENTMCNC: 33.5 G/DL (ref 32.4–35.8)
MD: NO
MONOCYTES # BLD AUTO: 0.7 X10^3/UL (ref 0.2–0.8)
MONOCYTES NFR BLD AUTO: 10 % (ref 2–9)
NEUTROPHILS # BLD AUTO: 4.5 X10^3/UL (ref 1.8–6.8)
NEUTROPHILS NFR BLD AUTO: 62 % (ref 42–75)
PLATELET # BLD AUTO: 281 X10^3/UL (ref 130–400)
PMV BLD AUTO: 9.5 FL (ref 7.4–10.4)
RBC # BLD AUTO: 4.55 X10^6/UL (ref 3.82–5.3)
TROPONIN I SERPL-MCNC: < 0.015 NG/ML (ref 0–0.04)

## 2021-02-23 PROCEDURE — A9502 TC99M TETROFOSMIN: HCPCS

## 2021-02-23 PROCEDURE — 36415 COLL VENOUS BLD VENIPUNCTURE: CPT

## 2021-02-23 PROCEDURE — 80061 LIPID PANEL: CPT

## 2021-02-23 PROCEDURE — 99285 EMERGENCY DEPT VISIT HI MDM: CPT

## 2021-02-23 PROCEDURE — 71045 X-RAY EXAM CHEST 1 VIEW: CPT

## 2021-02-23 PROCEDURE — 84484 ASSAY OF TROPONIN QUANT: CPT

## 2021-02-23 PROCEDURE — 85379 FIBRIN DEGRADATION QUANT: CPT

## 2021-02-23 PROCEDURE — 80048 BASIC METABOLIC PNL TOTAL CA: CPT

## 2021-02-23 PROCEDURE — 93017 CV STRESS TEST TRACING ONLY: CPT

## 2021-02-23 PROCEDURE — G0378 HOSPITAL OBSERVATION PER HR: HCPCS

## 2021-02-23 PROCEDURE — 83880 ASSAY OF NATRIURETIC PEPTIDE: CPT

## 2021-02-23 PROCEDURE — 78452 HT MUSCLE IMAGE SPECT MULT: CPT

## 2021-02-23 PROCEDURE — 82040 ASSAY OF SERUM ALBUMIN: CPT

## 2021-02-23 PROCEDURE — 93005 ELECTROCARDIOGRAM TRACING: CPT

## 2021-02-23 PROCEDURE — 85025 COMPLETE CBC W/AUTO DIFF WBC: CPT

## 2021-02-23 NOTE — NUR
REPORT GIVEN TO LONG CAMACHO. PT IS READY FOR TRANSPORT AT THIS TIME. RESP EVEN AND 
UNLABORED, ANEESH. 

-------------------------------------------------------------------------------

Addendum: 02/23/21 at 1334 by CBRUCIAGA

-------------------------------------------------------------------------------

REPORT GIVEN TO KING CAMACHO. PT IS READY FOR TRANSPORT AT THIS TIME. RESP EVEN 
AND UNLABOREDANEESH.

## 2021-02-23 NOTE — NUR
THIS IS A 64 YO F W/ C/O CP STARTING AT 0300 THIS AM. PT DENIES SOB/DIZZINESS. 
PT REPORTS TAKING 325MG@ 0500 AND @0900 W/ RELIEF. 650MG TOTAL. PT DENIES 
CARDIAC HX. PT RESTING ON GURNEY W/ CALL LIGHT IN REACH AND SIDE RAILS UPX2. 
FAMILY AT BEDSIDE. CONNECTED TO ALL MONITORING. RESP EVEN AND UNLABORED, NADN.

## 2021-02-24 VITALS — SYSTOLIC BLOOD PRESSURE: 127 MMHG | DIASTOLIC BLOOD PRESSURE: 73 MMHG

## 2021-02-24 VITALS — SYSTOLIC BLOOD PRESSURE: 114 MMHG | DIASTOLIC BLOOD PRESSURE: 71 MMHG

## 2021-02-24 LAB
CHOL/HDL RATIO: 3.3
HDL CHOL %: 30 % (ref 28–40)
HDL CHOLESTEROL (DIRECT): 59 MG/DL (ref 40–60)
LDL CHOLESTEROL,CALCULATED: 121 MG/DL (ref 54–169)
LDLC/HDLC SERPL: 2.1 {RATIO} (ref 0.5–3)
TRIGL SERPL-MCNC: 70 MG/DL (ref 50–200)
VLDLC SERPL CALC-MCNC: 14 MG/DL (ref 0–25)

## 2021-03-03 DIAGNOSIS — Z23 NEED FOR VACCINATION: ICD-10-CM

## 2021-03-12 ENCOUNTER — HOSPITAL ENCOUNTER (OUTPATIENT)
Dept: HOSPITAL 8 - CVU | Age: 66
Discharge: HOME | End: 2021-03-12
Attending: INTERNAL MEDICINE
Payer: MEDICARE

## 2021-03-12 DIAGNOSIS — I08.8: Primary | ICD-10-CM

## 2021-03-12 DIAGNOSIS — I11.9: ICD-10-CM

## 2021-03-12 PROCEDURE — 93306 TTE W/DOPPLER COMPLETE: CPT

## 2023-01-06 PROBLEM — M75.101 TEAR OF RIGHT SUPRASPINATUS TENDON: Status: ACTIVE | Noted: 2023-01-06

## 2023-01-06 PROBLEM — S46.811A INFRASPINATUS TENDON TEAR, RIGHT, INITIAL ENCOUNTER: Status: ACTIVE | Noted: 2023-01-06

## 2023-01-06 PROBLEM — M25.511 RIGHT SHOULDER PAIN: Status: ACTIVE | Noted: 2023-01-06

## 2023-01-06 PROBLEM — M75.101 RIGHT ROTATOR CUFF TEAR: Status: ACTIVE | Noted: 2023-01-06

## 2023-04-17 ENCOUNTER — APPOINTMENT (OUTPATIENT)
Dept: ADMISSIONS | Facility: MEDICAL CENTER | Age: 68
End: 2023-04-17
Attending: ORTHOPAEDIC SURGERY
Payer: MEDICARE

## 2023-04-24 ENCOUNTER — HOSPITAL ENCOUNTER (OUTPATIENT)
Dept: LAB | Facility: MEDICAL CENTER | Age: 68
End: 2023-04-24
Attending: INTERNAL MEDICINE
Payer: MEDICARE

## 2023-04-24 ENCOUNTER — OFFICE VISIT (OUTPATIENT)
Dept: RHEUMATOLOGY | Facility: MEDICAL CENTER | Age: 68
End: 2023-04-24
Attending: INTERNAL MEDICINE
Payer: MEDICARE

## 2023-04-24 ENCOUNTER — HOSPITAL ENCOUNTER (OUTPATIENT)
Dept: RADIOLOGY | Facility: MEDICAL CENTER | Age: 68
End: 2023-04-24
Attending: INTERNAL MEDICINE
Payer: MEDICARE

## 2023-04-24 VITALS
OXYGEN SATURATION: 96 % | WEIGHT: 235 LBS | SYSTOLIC BLOOD PRESSURE: 144 MMHG | HEART RATE: 82 BPM | TEMPERATURE: 98.4 F | DIASTOLIC BLOOD PRESSURE: 80 MMHG | BODY MASS INDEX: 40.12 KG/M2 | HEIGHT: 64 IN

## 2023-04-24 DIAGNOSIS — M05.79 RHEUMATOID ARTHRITIS INVOLVING MULTIPLE SITES WITH POSITIVE RHEUMATOID FACTOR (HCC): ICD-10-CM

## 2023-04-24 DIAGNOSIS — Z79.899 LONG-TERM USE OF PLAQUENIL: ICD-10-CM

## 2023-04-24 DIAGNOSIS — Z79.1 NSAID LONG-TERM USE: ICD-10-CM

## 2023-04-24 DIAGNOSIS — M67.911 DYSFUNCTION OF RIGHT ROTATOR CUFF: ICD-10-CM

## 2023-04-24 LAB
BASOPHILS # BLD AUTO: 1 % (ref 0–1.8)
BASOPHILS # BLD: 0.08 K/UL (ref 0–0.12)
EOSINOPHIL # BLD AUTO: 0.19 K/UL (ref 0–0.51)
EOSINOPHIL NFR BLD: 2.4 % (ref 0–6.9)
ERYTHROCYTE [DISTWIDTH] IN BLOOD BY AUTOMATED COUNT: 50.8 FL (ref 35.9–50)
ERYTHROCYTE [SEDIMENTATION RATE] IN BLOOD BY WESTERGREN METHOD: 18 MM/HOUR (ref 0–25)
HCT VFR BLD AUTO: 37.9 % (ref 37–47)
HGB BLD-MCNC: 12.3 G/DL (ref 12–16)
IMM GRANULOCYTES # BLD AUTO: 0.02 K/UL (ref 0–0.11)
IMM GRANULOCYTES NFR BLD AUTO: 0.2 % (ref 0–0.9)
LYMPHOCYTES # BLD AUTO: 2.23 K/UL (ref 1–4.8)
LYMPHOCYTES NFR BLD: 27.7 % (ref 22–41)
MCH RBC QN AUTO: 28.7 PG (ref 27–33)
MCHC RBC AUTO-ENTMCNC: 32.5 G/DL (ref 33.6–35)
MCV RBC AUTO: 88.6 FL (ref 81.4–97.8)
MONOCYTES # BLD AUTO: 0.84 K/UL (ref 0–0.85)
MONOCYTES NFR BLD AUTO: 10.4 % (ref 0–13.4)
NEUTROPHILS # BLD AUTO: 4.69 K/UL (ref 2–7.15)
NEUTROPHILS NFR BLD: 58.3 % (ref 44–72)
NRBC # BLD AUTO: 0 K/UL
NRBC BLD-RTO: 0 /100 WBC
PLATELET # BLD AUTO: 358 K/UL (ref 164–446)
PMV BLD AUTO: 11.9 FL (ref 9–12.9)
RBC # BLD AUTO: 4.28 M/UL (ref 4.2–5.4)
WBC # BLD AUTO: 8.1 K/UL (ref 4.8–10.8)

## 2023-04-24 PROCEDURE — 77077 JOINT SURVEY SINGLE VIEW: CPT

## 2023-04-24 PROCEDURE — 85025 COMPLETE CBC W/AUTO DIFF WBC: CPT

## 2023-04-24 PROCEDURE — 80053 COMPREHEN METABOLIC PANEL: CPT

## 2023-04-24 PROCEDURE — 99212 OFFICE O/P EST SF 10 MIN: CPT | Performed by: INTERNAL MEDICINE

## 2023-04-24 PROCEDURE — 85652 RBC SED RATE AUTOMATED: CPT

## 2023-04-24 PROCEDURE — 99205 OFFICE O/P NEW HI 60 MIN: CPT | Performed by: INTERNAL MEDICINE

## 2023-04-24 PROCEDURE — 36415 COLL VENOUS BLD VENIPUNCTURE: CPT

## 2023-04-24 PROCEDURE — 82955 ASSAY OF G6PD ENZYME: CPT

## 2023-04-24 RX ORDER — AMLODIPINE BESYLATE 10 MG/1
10 TABLET ORAL EVERY EVENING
COMMUNITY
Start: 2023-02-13 | End: 2023-07-12 | Stop reason: SINTOL

## 2023-04-24 RX ORDER — NITROFURANTOIN MACROCRYSTALS 50 MG/1
50 CAPSULE ORAL PRN
COMMUNITY
End: 2023-07-12

## 2023-04-24 RX ORDER — HYDROXYCHLOROQUINE SULFATE 200 MG/1
TABLET, FILM COATED ORAL
Qty: 180 TABLET | Refills: 1 | Status: SHIPPED | OUTPATIENT
Start: 2023-04-24 | End: 2023-10-26 | Stop reason: SDUPTHER

## 2023-04-24 RX ORDER — ESTRADIOL 10 UG/1
10 INSERT VAGINAL
COMMUNITY

## 2023-04-24 RX ORDER — METHOCARBAMOL 750 MG/1
750 TABLET, FILM COATED ORAL PRN
COMMUNITY

## 2023-04-24 RX ORDER — CELECOXIB 100 MG/1
100 CAPSULE ORAL
COMMUNITY
Start: 2023-03-02

## 2023-04-24 ASSESSMENT — JOINT PAIN
TOTAL NUMBER OF TENDER JOINTS: 1
TOTAL NUMBER OF SWOLLEN JOINTS: 3

## 2023-04-24 NOTE — ASSESSMENT & PLAN NOTE
Patient states started 12/2022, with pain in shoulders, bilateral hands, no fevers, no rashes, no vision changes, no anemia,       PMHx  Right RCT pending Dr Lemon   Left Renal cyst   Tonsillectomy  Tubal ligation  Varicose veins removed  hemmroidectomy  Cataract surgery  meniscal surgery right knee  Chronic low pain DDD/DJD followed at [facility] [place] s/p eval by neurosurgery    Fam Hx  M-passed CAD  F-passed CAD  Bx2 alive and well    Soc Hx  No tobacco  ETOH 2x/week  No blood tx  No tattoos    RF 17.4 1/2023 [facility]  CCP neg 1/2023 [facility]

## 2023-04-24 NOTE — PROGRESS NOTES
Chief Complaint-joint pain      No chief complaint on file.    Jodi Lopez is a 67 y.o. female here as a new Rheumatology Consult referred by Gordon Long M.D.    This is a new patient evaluation      HPI:     This is a new patient evaluation, patient referred to rheumatology for evaluation of joint pain starting about December 2022 with pain in shoulders, bilateral hands and knees.  Patient denies any fevers or rashes associated with the joint pain, no vision changes, no anemia.  Patient is status post evaluation by her primary care doctor who found a positive rheumatoid factor subsequently referred to rheumatology for evaluation of possible rheumatoid arthritis.  Patient states that she is status post a 7-day course of prednisone with great benefit.    Comorbidities include chronic low back pain status post evaluation by neurosurgery at [facility]    PMHx  Right RCT pending Dr Lemon   Left Renal cyst   Tonsillectomy  Tubal ligation  Varicose veins removed  hemmroidectomy  Cataract surgery  meniscal surgery right knee  Chronic low pain DDD/DJD followed at [facility] ([facility] [place]) s/p eval by neurosurgery    Fam Hx  M-passed CAD  F-passed CAD  Bx2 alive and well    Soc Hx  No tobacco  ETOH 2x/week  No blood tx  No tattoos    Addendum 4/27/2023  G6PD 15.8 adequate 4/2023    RF 17.4 1/2023 [facility]  CCP neg 1/2023 [facility]    Hand X-rays 4/2023-IMPRESSION:  Mild arthritic changes. Question marginal erosions PIP joint of the left third finger.    Feet X-rays 4/2023-IMPRESSION:  Mild arthritic changes. No definite erosive changes.    Current medicines (including changes today)  Current Outpatient Medications   Medication Sig Dispense Refill    amLODIPine (NORVASC) 10 MG Tab Take 10 mg by mouth.      celecoxib (CELEBREX) 100 MG Cap Take 100 mg by mouth.      estradiol (VAGIFEM) 10 MCG Tab Insert 10 mg into the vagina.      methocarbamol (ROBAXIN) 750 MG Tab Take 750 mg by mouth as  needed.      nitrofurantoin (MACRODANTIN) 50 MG Cap Take 50 mg by mouth as needed.      hydroxychloroquine (PLAQUENIL) 200 MG Tab 1 tab po bid 180 Tablet 1    Loratadine (ALAVERT PO) Take  by mouth as needed.      ketoconazole (NIZORAL) 2 % shampoo Apply  to affected area(s) 1 time daily as needed for Itching.      calcium carbonate (TUMS) 500 MG Chew Tab Take 500 mg by mouth 3 times a day as needed.      Glucosamine HCl-MSM (GLUCOSAMINE-MSM PO) Take 1 Tab by mouth every day.      Diclofenac Sodium 1 % Gel Apply 1 Application to skin as directed 2 times a day as needed (PAIN).      meloxicam (MOBIC) 15 MG tablet Take 1 Tablet by mouth every day. 30 Tablet 0    meloxicam (MOBIC) 15 MG tablet Take 1 Tablet by mouth every day. 30 Tablet 0    estradiol (ESTRACE) 0.1 MG/GM vaginal cream USE 1/2 GRAM VAGINALLY 3 TIMES A WEEK FOR 30 DAYS      Coenzyme Q10 100 MG Cap Take  by mouth every day. (Patient not taking: Reported on 4/24/2023)      TURMERIC PO Take 1,000 mg by mouth every day.      Multiple Vitamins-Minerals (AIRBORNE GUMMIES PO) Take  by mouth every day. (Patient not taking: Reported on 4/24/2023)      ascorbic acid (ASCORBIC ACID) 500 MG Tab Take 500 mg by mouth every day. (Patient not taking: Reported on 4/24/2023)      Non Formulary Request every day. OTC Nutri Calm (Patient not taking: Reported on 4/24/2023)      Non Formulary Request every 48 hours. OTC Adrenal Support      CRANBERRY PO Take 650 mg by mouth as needed.      Bromelains 500 MG Tab Take 1 Tab by mouth every day. (Patient not taking: Reported on 4/24/2023)      Non Formulary Request as needed. OTC Papaya Mint (Patient not taking: Reported on 4/24/2023)      Cyanocobalamin (VITAMIN B-12) 5000 MCG TABLET DISPERSIBLE Take  by mouth every 48 hours.      ibuprofen (MOTRIN) 200 MG Tab Take 400 mg by mouth every 6 hours as needed (PAIN).      Magnesium 400 MG Tab Take 1 Tab by mouth every day. (Patient not taking: Reported on 4/24/2023)       Calcium-Magnesium-Vitamin D (CALCIUM MAGNESIUM PO) Take 1 Tab by mouth every day. (Patient not taking: Reported on 4/24/2023)      vitamin D (CHOLECALCIFEROL) 1000 UNIT TABS Take 1,000 Units by mouth every day. (Patient not taking: Reported on 4/24/2023)       No current facility-administered medications for this visit.     She  has a past medical history of Anesthesia, Arrhythmia, Arthritis, Blood clotting disorder ([facility]) (1981), Cataract, Chronic back pain, Cold sore (8/1/2016), Heart burn, Listeria meningitis (1985), NEGATIVE HISTORY OF, Pain, Pain, Pins and needles sensation, Renal disorder (2016), Snoring, TIA (transient ischemic attack) (10/2015), and TIA (transient ischemic attack) (2015).  She  has a past surgical history that includes tubal ligation; tonsillectomy; breast biopsy; hemorrhoidectomy (8/5/2016); other (2015); other (2015); other (2010); orif, wrist (Right, 10/9/2019); ligament repair (Right, 10/9/2019); pr knee scope,diagnostic (Right, 6/4/2020); and meniscectomy, knee, medial (Right, 6/4/2020).  Family History   Problem Relation Age of Onset    Heart Disease Other     Arthritis Neg Hx     Cancer Neg Hx      Family Status   Relation Name Status    OTHER  (Not Specified)    Neg Hx  (Not Specified)     Social History     Tobacco Use    Smoking status: Passive Smoke Exposure - Never Smoker    Smokeless tobacco: Never   Vaping Use    Vaping Use: Never used   Substance Use Topics    Alcohol use: Yes     Comment: 1-2 per week    Drug use: No     Social History     Social History Narrative    Not on file       ROS   Other than what is mentioned in HPI or physical exam, there is no history of headaches, double vision or blurred vision. No temporal tenderness or jaw claudication.  No trouble swallowing difficulties or sore throats.  No chest complaints including chest pain, cough or sputum production. No GI complaints including nausea, vomiting, change in bowel habits, or past peptic ulcer disease. No  "history of blood in the stools. No urinary complaints including dysuria or frequency. No history of alopecia, photosensitivity, oral ulcerations, Raynaud's phenomena.       Objective:     BP (!) 144/80 (BP Location: Right arm, Patient Position: Sitting, BP Cuff Size: Adult)   Pulse 82   Temp 36.9 °C (98.4 °F) (Temporal)   Ht 1.613 m (5' 3.5\")   Wt 107 kg (235 lb)   SpO2 96%  Body mass index is 40.98 kg/m².  Physical Exam:    Constitutional: Alert and oriented X3, no distress.Skin: Warm, dry, good turgor, no rashes in visible areas.  Eye: Equal, round and reactive, conjunctiva clear, lids normal EOM intactENMT: Lips without lesions, good dentition, no oropharyngeal ulcers, moist buccal mucosa, pinna without deformity, no parotid gland enlargement neck: Trachea midline, no masses, no thyromegaly.Lymph:  No cervical lymphadenopathy, no axillary lymphadenopathy, no supraclavicular lymphadenopathyRespiratory: Unlabored respiratory effort, lungs clear to auscultation, no wheezes, no ronchi.Cardiovascular: Normal S1, S2, Regular rate and rhythm, no murmurs rubs or gallops  Abdomen: Soft, non-tender, no masses, no hepatosplenomegaly, overweight.Psych: Alert and oriented x3, normal affect and mood.Neuro Cranial nerves 2-12 are grossly intact, no loss of sensation LEExt:no joint laxity noted in bilateral arms, no joint laxity noted in bilateral legs, no swan-neck or boutonniere deformities, no sausage digits, no dactylitis, shoulders full range of motion but positive painful arc, elbows without flexion contractures, toes without crossover toes and without splay toes, crepitus in knees but no synovitis      Lab Results   Component Value Date/Time    SODIUM 137 02/23/2021 11:31 AM    SODIUM 140 06/02/2020 10:21 AM    POTASSIUM 3.9 02/23/2021 11:31 AM    POTASSIUM 4.0 06/02/2020 10:21 AM    CHLORIDE 107 02/23/2021 11:31 AM    CHLORIDE 103 06/02/2020 10:21 AM    CO2 24 02/23/2021 11:31 AM    CO2 24 06/02/2020 10:21 AM    " GLUCOSE 93 02/23/2021 11:31 AM    GLUCOSE 85 06/02/2020 10:21 AM    BUN 20 (H) 02/23/2021 11:31 AM    BUN 18 06/02/2020 10:21 AM    CREATININE 0.66 02/23/2021 11:31 AM    CREATININE 0.65 06/02/2020 10:21 AM    CREATININE 0.82 11/27/2012 12:00 AM    BUNCREATRAT 24 (H) 11/27/2012 12:00 AM      Lab Results   Component Value Date/Time    WBC 7.5 06/02/2020 10:21 AM    RBC 4.55 02/23/2021 11:31 AM    RBC 4.77 06/02/2020 10:21 AM    HEMOGLOBIN 14.7 03/02/2022 08:09 AM    HEMOGLOBIN 14.5 06/02/2020 10:21 AM    HEMATOCRIT 43.7 03/02/2022 08:09 AM    HEMATOCRIT 44.4 06/02/2020 10:21 AM    MCV 91 03/02/2022 08:09 AM    MCV 93.1 06/02/2020 10:21 AM    MCH 30.8 03/02/2022 08:09 AM    MCH 30.4 06/02/2020 10:21 AM    MCHC 33.6 03/02/2022 08:09 AM    MCHC 32.7 (L) 06/02/2020 10:21 AM    MPV 9.5 02/23/2021 11:31 AM    MPV 11.7 06/02/2020 10:21 AM    NEUTSPOLYS 58 03/02/2022 08:09 AM    LYMPHOCYTES 29 03/02/2022 08:09 AM    MONOCYTES 9 03/02/2022 08:09 AM    EOSINOPHILS 3 03/02/2022 08:09 AM    BASOPHILS 1 03/02/2022 08:09 AM      Lab Results   Component Value Date/Time    CALCIUM 8.8 02/23/2021 11:31 AM    CALCIUM 9.2 06/02/2020 10:21 AM    ASTSGOT 31 11/27/2012 12:00 AM    ALTSGPT 32 11/27/2012 12:00 AM    ALKPHOSPHAT 61 11/27/2012 12:00 AM    TBILIRUBIN 0.5 11/27/2012 12:00 AM    ALBUMIN 3.6 02/23/2021 11:31 AM    ALBUMIN 4.3 11/27/2012 12:00 AM    TOTPROTEIN 6.8 11/27/2012 12:00 AM     Lab Results   Component Value Date/Time    HBA1C 5.8 (H) 03/02/2022 08:09 AM     Results for orders placed in visit on 12/27/22    DX-SHOULDER 2+    Results for orders placed in visit on 01/05/22    DX-HAND 3+ LEFT    Results for orders placed during the hospital encounter of 04/05/12    MR-KNEE-W/O    Impression  1. Grade 2 PCL, one ACL sprains    2. Complex medial and lateral meniscus tearing    3. Slight medial femoral condyle impaction fracture    4. Grade 2 popliteus strain    5. Deep MCL sprain    6. Mild medial femorotibial osteophyte  formation    7. Mild chondromalacia patella      Assessment and Plan:  1. Rheumatoid arthritis involving multiple sites with positive rheumatoid factor (HCC)  We will do a trial of Plaquenil 200 mg p.o. twice daily, reevaluate in about 10 weeks, okay to continue Celebrex and Tylenol.  - DX-JOINT SURVEY-HANDS SINGLE VIEW; Future  - DX-JOINT SURVEY-FEET SINGLE VIEW; Future  - G6PD QUANT + RBC; Future  - CBC WITH DIFFERENTIAL; Future  - Comp Metabolic Panel; Future  - Sed Rate; Future  - hydroxychloroquine (PLAQUENIL) 200 MG Tab; 1 tab po bid  Dispense: 180 Tablet; Refill: 1    2. Long-term use of Plaquenil  Quinil 200 mg p.o. twice daily, check G6PD levels, if we continue Plaquenil will need to do an eye exam every year, patient also needs monitoring labs every 6 months, labs ordered for patient today.  - DX-JOINT SURVEY-HANDS SINGLE VIEW; Future  - DX-JOINT SURVEY-FEET SINGLE VIEW; Future  - G6PD QUANT + RBC; Future  - CBC WITH DIFFERENTIAL; Future  - Comp Metabolic Panel; Future  - Sed Rate; Future  - hydroxychloroquine (PLAQUENIL) 200 MG Tab; 1 tab po bid  Dispense: 180 Tablet; Refill: 1    3. NSAID long-term use  Currently on Celebrex as needed, patient only monitoring labs every 6 months, patient is due for labs now, labs ordered for patient    4. Dysfunction of right rotator cuff  Patient scheduled for surgical intervention May 2023.    Records requested.  Followup: Return in about 10 weeks (around 7/3/2023). or sooner prn  Patient was seen 60 minutes face-to-face (excluding time for procedures)  of which more than 50% the time was spent counseling the patient regarding  rheumatological conditions and care. Therapy was discussed in detail.  Thank you for this referral.    Please note that this dictation was created using voice recognition software. I have made every reasonable attempt to correct obvious errors, but I expect that there are errors of grammar and possibly content that I did not discover before  finalizing the note.

## 2023-04-25 ENCOUNTER — PRE-ADMISSION TESTING (OUTPATIENT)
Dept: ADMISSIONS | Facility: MEDICAL CENTER | Age: 68
End: 2023-04-25
Attending: ORTHOPAEDIC SURGERY
Payer: MEDICARE

## 2023-04-25 VITALS — HEIGHT: 64 IN | BODY MASS INDEX: 40.34 KG/M2

## 2023-04-25 LAB
ALBUMIN SERPL BCP-MCNC: 3.9 G/DL (ref 3.2–4.9)
ALBUMIN/GLOB SERPL: 1.3 G/DL
ALP SERPL-CCNC: 59 U/L (ref 30–99)
ALT SERPL-CCNC: 23 U/L (ref 2–50)
ANION GAP SERPL CALC-SCNC: 12 MMOL/L (ref 7–16)
AST SERPL-CCNC: 18 U/L (ref 12–45)
BILIRUB SERPL-MCNC: 0.2 MG/DL (ref 0.1–1.5)
BUN SERPL-MCNC: 19 MG/DL (ref 8–22)
CALCIUM ALBUM COR SERPL-MCNC: 9.1 MG/DL (ref 8.5–10.5)
CALCIUM SERPL-MCNC: 9 MG/DL (ref 8.5–10.5)
CHLORIDE SERPL-SCNC: 102 MMOL/L (ref 96–112)
CO2 SERPL-SCNC: 24 MMOL/L (ref 20–33)
CREAT SERPL-MCNC: 0.66 MG/DL (ref 0.5–1.4)
GFR SERPLBLD CREATININE-BSD FMLA CKD-EPI: 95 ML/MIN/1.73 M 2
GLOBULIN SER CALC-MCNC: 3 G/DL (ref 1.9–3.5)
GLUCOSE SERPL-MCNC: 87 MG/DL (ref 65–99)
POTASSIUM SERPL-SCNC: 3.6 MMOL/L (ref 3.6–5.5)
PROT SERPL-MCNC: 6.9 G/DL (ref 6–8.2)
SODIUM SERPL-SCNC: 138 MMOL/L (ref 135–145)

## 2023-04-25 NOTE — PREPROCEDURE INSTRUCTIONS
"Preadmit Phone appointment: \" Preparing for your Procedure information\" Instructions discussed with Patient.       Patient instructed to continue prescribed medications through the day before surgery, instructed to take the following medications the day of surgery per anesthesia protocol:         Pt states, \"no issues with anesthesia\".  Fasting guidelines discussed with Patient, patient will follow MD's instructions for Pre-Surgery Diet.      All Pt's questions and concerns answered or addressed.  Emailed all instructions.     According to patient had EKG at Dr. Martino with [facility] 1/2023.     "

## 2023-04-26 LAB — G6PD RBC-CCNC: 15.8 U/G HB (ref 9.9–16.6)

## 2023-05-03 ENCOUNTER — HOSPITAL ENCOUNTER (OUTPATIENT)
Dept: LAB | Facility: MEDICAL CENTER | Age: 68
End: 2023-05-03
Attending: PHYSICIAN ASSISTANT
Payer: MEDICARE

## 2023-05-03 ENCOUNTER — ANESTHESIA EVENT (OUTPATIENT)
Dept: SURGERY | Facility: MEDICAL CENTER | Age: 68
End: 2023-05-03
Payer: MEDICARE

## 2023-05-03 ENCOUNTER — HOSPITAL ENCOUNTER (OUTPATIENT)
Dept: LAB | Facility: MEDICAL CENTER | Age: 68
End: 2023-05-03
Attending: INTERNAL MEDICINE
Payer: MEDICARE

## 2023-05-03 LAB
ALBUMIN SERPL BCP-MCNC: 4.3 G/DL (ref 3.2–4.9)
ALBUMIN/GLOB SERPL: 1.4 G/DL
ALP SERPL-CCNC: 61 U/L (ref 30–99)
ALT SERPL-CCNC: 20 U/L (ref 2–50)
ANION GAP SERPL CALC-SCNC: 13 MMOL/L (ref 7–16)
AST SERPL-CCNC: 18 U/L (ref 12–45)
BASOPHILS # BLD AUTO: 0.9 % (ref 0–1.8)
BASOPHILS # BLD: 0.06 K/UL (ref 0–0.12)
BILIRUB SERPL-MCNC: 0.3 MG/DL (ref 0.1–1.5)
BUN SERPL-MCNC: 15 MG/DL (ref 8–22)
CALCIUM ALBUM COR SERPL-MCNC: 9.1 MG/DL (ref 8.5–10.5)
CALCIUM SERPL-MCNC: 9.3 MG/DL (ref 8.5–10.5)
CHLORIDE SERPL-SCNC: 103 MMOL/L (ref 96–112)
CHOLEST SERPL-MCNC: 190 MG/DL (ref 100–199)
CO2 SERPL-SCNC: 24 MMOL/L (ref 20–33)
CREAT SERPL-MCNC: 0.7 MG/DL (ref 0.5–1.4)
EOSINOPHIL # BLD AUTO: 0.14 K/UL (ref 0–0.51)
EOSINOPHIL NFR BLD: 2.2 % (ref 0–6.9)
ERYTHROCYTE [DISTWIDTH] IN BLOOD BY AUTOMATED COUNT: 51.1 FL (ref 35.9–50)
FASTING STATUS PATIENT QL REPORTED: NORMAL
FASTING STATUS PATIENT QL REPORTED: NORMAL
GFR SERPLBLD CREATININE-BSD FMLA CKD-EPI: 94 ML/MIN/1.73 M 2
GLOBULIN SER CALC-MCNC: 3.1 G/DL (ref 1.9–3.5)
GLUCOSE SERPL-MCNC: 87 MG/DL (ref 65–99)
HCT VFR BLD AUTO: 42 % (ref 37–47)
HDLC SERPL-MCNC: 57 MG/DL
HGB BLD-MCNC: 13.2 G/DL (ref 12–16)
IMM GRANULOCYTES # BLD AUTO: 0.02 K/UL (ref 0–0.11)
IMM GRANULOCYTES NFR BLD AUTO: 0.3 % (ref 0–0.9)
LDLC SERPL CALC-MCNC: 115 MG/DL
LYMPHOCYTES # BLD AUTO: 1.76 K/UL (ref 1–4.8)
LYMPHOCYTES NFR BLD: 27.4 % (ref 22–41)
MCH RBC QN AUTO: 28.2 PG (ref 27–33)
MCHC RBC AUTO-ENTMCNC: 31.4 G/DL (ref 33.6–35)
MCV RBC AUTO: 89.7 FL (ref 81.4–97.8)
MONOCYTES # BLD AUTO: 0.64 K/UL (ref 0–0.85)
MONOCYTES NFR BLD AUTO: 10 % (ref 0–13.4)
NEUTROPHILS # BLD AUTO: 3.8 K/UL (ref 2–7.15)
NEUTROPHILS NFR BLD: 59.2 % (ref 44–72)
NRBC # BLD AUTO: 0 K/UL
NRBC BLD-RTO: 0 /100 WBC
PLATELET # BLD AUTO: 363 K/UL (ref 164–446)
PMV BLD AUTO: 10.9 FL (ref 9–12.9)
POTASSIUM SERPL-SCNC: 4 MMOL/L (ref 3.6–5.5)
PROT SERPL-MCNC: 7.4 G/DL (ref 6–8.2)
RBC # BLD AUTO: 4.68 M/UL (ref 4.2–5.4)
SODIUM SERPL-SCNC: 140 MMOL/L (ref 135–145)
TRIGL SERPL-MCNC: 89 MG/DL (ref 0–149)
URATE SERPL-MCNC: 4.9 MG/DL (ref 1.9–8.2)
WBC # BLD AUTO: 6.4 K/UL (ref 4.8–10.8)

## 2023-05-03 PROCEDURE — 82306 VITAMIN D 25 HYDROXY: CPT

## 2023-05-03 PROCEDURE — 86038 ANTINUCLEAR ANTIBODIES: CPT

## 2023-05-03 PROCEDURE — 84550 ASSAY OF BLOOD/URIC ACID: CPT

## 2023-05-03 PROCEDURE — 84443 ASSAY THYROID STIM HORMONE: CPT

## 2023-05-03 PROCEDURE — 36415 COLL VENOUS BLD VENIPUNCTURE: CPT

## 2023-05-03 PROCEDURE — 85025 COMPLETE CBC W/AUTO DIFF WBC: CPT

## 2023-05-03 PROCEDURE — 80053 COMPREHEN METABOLIC PANEL: CPT

## 2023-05-03 PROCEDURE — 84439 ASSAY OF FREE THYROXINE: CPT

## 2023-05-03 PROCEDURE — 80061 LIPID PANEL: CPT

## 2023-05-04 ENCOUNTER — HOSPITAL ENCOUNTER (OUTPATIENT)
Facility: MEDICAL CENTER | Age: 68
End: 2023-05-04
Attending: ORTHOPAEDIC SURGERY | Admitting: ORTHOPAEDIC SURGERY
Payer: MEDICARE

## 2023-05-04 ENCOUNTER — ANESTHESIA (OUTPATIENT)
Dept: SURGERY | Facility: MEDICAL CENTER | Age: 68
End: 2023-05-04
Payer: MEDICARE

## 2023-05-04 VITALS
TEMPERATURE: 96.8 F | OXYGEN SATURATION: 90 % | RESPIRATION RATE: 16 BRPM | DIASTOLIC BLOOD PRESSURE: 77 MMHG | HEART RATE: 82 BPM | BODY MASS INDEX: 39.54 KG/M2 | SYSTOLIC BLOOD PRESSURE: 139 MMHG | WEIGHT: 230.38 LBS

## 2023-05-04 DIAGNOSIS — M75.101 TEAR OF RIGHT ROTATOR CUFF, UNSPECIFIED TEAR EXTENT, UNSPECIFIED WHETHER TRAUMATIC: ICD-10-CM

## 2023-05-04 DIAGNOSIS — M75.101 TEAR OF RIGHT SUPRASPINATUS TENDON: ICD-10-CM

## 2023-05-04 DIAGNOSIS — S46.811A INFRASPINATUS TENDON TEAR, RIGHT, INITIAL ENCOUNTER: ICD-10-CM

## 2023-05-04 DIAGNOSIS — G89.18 POST-OPERATIVE PAIN: ICD-10-CM

## 2023-05-04 LAB
25(OH)D3 SERPL-MCNC: 27 NG/ML (ref 30–100)
T4 FREE SERPL-MCNC: 1.21 NG/DL (ref 0.93–1.7)
TSH SERPL DL<=0.005 MIU/L-ACNC: 1.67 UIU/ML (ref 0.38–5.33)

## 2023-05-04 PROCEDURE — 29823 SHO ARTHRS SRG XTNSV DBRDMT: CPT | Mod: RT | Performed by: ORTHOPAEDIC SURGERY

## 2023-05-04 PROCEDURE — 700111 HCHG RX REV CODE 636 W/ 250 OVERRIDE (IP): Performed by: STUDENT IN AN ORGANIZED HEALTH CARE EDUCATION/TRAINING PROGRAM

## 2023-05-04 PROCEDURE — 160041 HCHG SURGERY MINUTES - EA ADDL 1 MIN LEVEL 4: Performed by: ORTHOPAEDIC SURGERY

## 2023-05-04 PROCEDURE — 160002 HCHG RECOVERY MINUTES (STAT): Performed by: ORTHOPAEDIC SURGERY

## 2023-05-04 PROCEDURE — 160036 HCHG PACU - EA ADDL 30 MINS PHASE I: Performed by: ORTHOPAEDIC SURGERY

## 2023-05-04 PROCEDURE — 29824 SHO ARTHRS SRG DSTL CLAVICLC: CPT | Mod: RT | Performed by: ORTHOPAEDIC SURGERY

## 2023-05-04 PROCEDURE — 160025 RECOVERY II MINUTES (STATS): Performed by: ORTHOPAEDIC SURGERY

## 2023-05-04 PROCEDURE — 700105 HCHG RX REV CODE 258: Performed by: ORTHOPAEDIC SURGERY

## 2023-05-04 PROCEDURE — 64415 NJX AA&/STRD BRCH PLXS IMG: CPT | Performed by: ORTHOPAEDIC SURGERY

## 2023-05-04 PROCEDURE — 700102 HCHG RX REV CODE 250 W/ 637 OVERRIDE(OP): Performed by: ORTHOPAEDIC SURGERY

## 2023-05-04 PROCEDURE — 64415 NJX AA&/STRD BRCH PLXS IMG: CPT | Mod: 59,RT | Performed by: STUDENT IN AN ORGANIZED HEALTH CARE EDUCATION/TRAINING PROGRAM

## 2023-05-04 PROCEDURE — 160048 HCHG OR STATISTICAL LEVEL 1-5: Performed by: ORTHOPAEDIC SURGERY

## 2023-05-04 PROCEDURE — 160009 HCHG ANES TIME/MIN: Performed by: ORTHOPAEDIC SURGERY

## 2023-05-04 PROCEDURE — A9270 NON-COVERED ITEM OR SERVICE: HCPCS | Performed by: ORTHOPAEDIC SURGERY

## 2023-05-04 PROCEDURE — 700101 HCHG RX REV CODE 250: Performed by: STUDENT IN AN ORGANIZED HEALTH CARE EDUCATION/TRAINING PROGRAM

## 2023-05-04 PROCEDURE — 700111 HCHG RX REV CODE 636 W/ 250 OVERRIDE (IP): Performed by: ORTHOPAEDIC SURGERY

## 2023-05-04 PROCEDURE — 01630 ANES OPN/ARTHR PX SHO JT NOS: CPT | Performed by: STUDENT IN AN ORGANIZED HEALTH CARE EDUCATION/TRAINING PROGRAM

## 2023-05-04 PROCEDURE — 29827 SHO ARTHRS SRG RT8TR CUF RPR: CPT | Mod: RT | Performed by: ORTHOPAEDIC SURGERY

## 2023-05-04 PROCEDURE — 160035 HCHG PACU - 1ST 60 MINS PHASE I: Performed by: ORTHOPAEDIC SURGERY

## 2023-05-04 PROCEDURE — 160046 HCHG PACU - 1ST 60 MINS PHASE II: Performed by: ORTHOPAEDIC SURGERY

## 2023-05-04 PROCEDURE — C1713 ANCHOR/SCREW BN/BN,TIS/BN: HCPCS | Performed by: ORTHOPAEDIC SURGERY

## 2023-05-04 PROCEDURE — 160029 HCHG SURGERY MINUTES - 1ST 30 MINS LEVEL 4: Performed by: ORTHOPAEDIC SURGERY

## 2023-05-04 DEVICE — ANCHOR SUTURE OMEGA PEEK OD4.75 MM 1 ARM KNOTLESS STERILE LATEX FREE DISPOSABLE: Type: IMPLANTABLE DEVICE | Site: SHOULDER | Status: FUNCTIONAL

## 2023-05-04 DEVICE — ANCHOR SUTURE OMEGA PEEK OD4.75 MM 2 KNOTLESS STERILE LATEX FREE: Type: IMPLANTABLE DEVICE | Site: SHOULDER | Status: FUNCTIONAL

## 2023-05-04 RX ORDER — BACITRACIN ZINC 500 [USP'U]/G
OINTMENT TOPICAL
Status: DISCONTINUED | OUTPATIENT
Start: 2023-05-04 | End: 2023-05-04 | Stop reason: HOSPADM

## 2023-05-04 RX ORDER — CEFAZOLIN SODIUM 1 G/3ML
2 INJECTION, POWDER, FOR SOLUTION INTRAMUSCULAR; INTRAVENOUS ONCE
Status: COMPLETED | OUTPATIENT
Start: 2023-05-04 | End: 2023-05-04

## 2023-05-04 RX ORDER — SODIUM CHLORIDE, SODIUM LACTATE, POTASSIUM CHLORIDE, CALCIUM CHLORIDE 600; 310; 30; 20 MG/100ML; MG/100ML; MG/100ML; MG/100ML
INJECTION, SOLUTION INTRAVENOUS CONTINUOUS
Status: DISCONTINUED | OUTPATIENT
Start: 2023-05-04 | End: 2023-05-04 | Stop reason: HOSPADM

## 2023-05-04 RX ORDER — ONDANSETRON 2 MG/ML
4 INJECTION INTRAMUSCULAR; INTRAVENOUS
Status: DISCONTINUED | OUTPATIENT
Start: 2023-05-04 | End: 2023-05-04 | Stop reason: HOSPADM

## 2023-05-04 RX ORDER — DEXAMETHASONE SODIUM PHOSPHATE 4 MG/ML
INJECTION, SOLUTION INTRA-ARTICULAR; INTRALESIONAL; INTRAMUSCULAR; INTRAVENOUS; SOFT TISSUE PRN
Status: DISCONTINUED | OUTPATIENT
Start: 2023-05-04 | End: 2023-05-04 | Stop reason: SURG

## 2023-05-04 RX ORDER — LIDOCAINE HYDROCHLORIDE 20 MG/ML
INJECTION, SOLUTION EPIDURAL; INFILTRATION; INTRACAUDAL; PERINEURAL PRN
Status: DISCONTINUED | OUTPATIENT
Start: 2023-05-04 | End: 2023-05-04 | Stop reason: SURG

## 2023-05-04 RX ORDER — HALOPERIDOL 5 MG/ML
1 INJECTION INTRAMUSCULAR
Status: DISCONTINUED | OUTPATIENT
Start: 2023-05-04 | End: 2023-05-04 | Stop reason: HOSPADM

## 2023-05-04 RX ORDER — ACETAMINOPHEN 500 MG
1000 TABLET ORAL ONCE
Status: DISCONTINUED | OUTPATIENT
Start: 2023-05-04 | End: 2023-05-04 | Stop reason: HOSPADM

## 2023-05-04 RX ORDER — SODIUM CHLORIDE, SODIUM LACTATE, POTASSIUM CHLORIDE, CALCIUM CHLORIDE 600; 310; 30; 20 MG/100ML; MG/100ML; MG/100ML; MG/100ML
INJECTION, SOLUTION INTRAVENOUS CONTINUOUS
Status: ACTIVE | OUTPATIENT
Start: 2023-05-04 | End: 2023-05-04

## 2023-05-04 RX ORDER — TRAMADOL HYDROCHLORIDE 50 MG/1
50 TABLET ORAL EVERY 6 HOURS PRN
Status: DISCONTINUED | OUTPATIENT
Start: 2023-05-04 | End: 2023-05-04 | Stop reason: HOSPADM

## 2023-05-04 RX ORDER — MEPERIDINE HYDROCHLORIDE 25 MG/ML
12.5 INJECTION INTRAMUSCULAR; INTRAVENOUS; SUBCUTANEOUS
Status: DISCONTINUED | OUTPATIENT
Start: 2023-05-04 | End: 2023-05-04 | Stop reason: HOSPADM

## 2023-05-04 RX ORDER — MIDAZOLAM HYDROCHLORIDE 1 MG/ML
INJECTION INTRAMUSCULAR; INTRAVENOUS PRN
Status: DISCONTINUED | OUTPATIENT
Start: 2023-05-04 | End: 2023-05-04 | Stop reason: SURG

## 2023-05-04 RX ORDER — BUPIVACAINE HYDROCHLORIDE 2.5 MG/ML
INJECTION, SOLUTION EPIDURAL; INFILTRATION; INTRACAUDAL PRN
Status: DISCONTINUED | OUTPATIENT
Start: 2023-05-04 | End: 2023-05-04 | Stop reason: SURG

## 2023-05-04 RX ORDER — DEXAMETHASONE SODIUM PHOSPHATE 4 MG/ML
INJECTION, SOLUTION INTRA-ARTICULAR; INTRALESIONAL; INTRAMUSCULAR; INTRAVENOUS; SOFT TISSUE
Status: COMPLETED | OUTPATIENT
Start: 2023-05-04 | End: 2023-05-04

## 2023-05-04 RX ORDER — HYDROCODONE BITARTRATE AND ACETAMINOPHEN 5; 325 MG/1; MG/1
1 TABLET ORAL EVERY 4 HOURS PRN
Qty: 42 TABLET | Refills: 0 | Status: SHIPPED | OUTPATIENT
Start: 2023-05-04 | End: 2023-05-11

## 2023-05-04 RX ORDER — DIPHENHYDRAMINE HYDROCHLORIDE 50 MG/ML
12.5 INJECTION INTRAMUSCULAR; INTRAVENOUS
Status: DISCONTINUED | OUTPATIENT
Start: 2023-05-04 | End: 2023-05-04 | Stop reason: HOSPADM

## 2023-05-04 RX ADMIN — MIDAZOLAM HYDROCHLORIDE 1 MG: 1 INJECTION, SOLUTION INTRAMUSCULAR; INTRAVENOUS at 13:50

## 2023-05-04 RX ADMIN — FENTANYL CITRATE 50 MCG: 50 INJECTION, SOLUTION INTRAMUSCULAR; INTRAVENOUS at 14:00

## 2023-05-04 RX ADMIN — CEFAZOLIN 2 G: 330 INJECTION, POWDER, FOR SOLUTION INTRAMUSCULAR; INTRAVENOUS at 14:06

## 2023-05-04 RX ADMIN — LIDOCAINE HYDROCHLORIDE 80 MG: 20 INJECTION, SOLUTION EPIDURAL; INFILTRATION; INTRACAUDAL at 14:02

## 2023-05-04 RX ADMIN — BUPIVACAINE HYDROCHLORIDE 20 ML: 2.5 INJECTION, SOLUTION EPIDURAL; INFILTRATION; INTRACAUDAL; PERINEURAL at 13:55

## 2023-05-04 RX ADMIN — DEXAMETHASONE SODIUM PHOSPHATE 6 MG: 4 INJECTION, SOLUTION INTRA-ARTICULAR; INTRALESIONAL; INTRAMUSCULAR; INTRAVENOUS; SOFT TISSUE at 14:06

## 2023-05-04 RX ADMIN — DEXAMETHASONE SODIUM PHOSPHATE 1.3 MG: 4 INJECTION, SOLUTION INTRA-ARTICULAR; INTRALESIONAL; INTRAMUSCULAR; INTRAVENOUS; SOFT TISSUE at 13:55

## 2023-05-04 RX ADMIN — SODIUM CHLORIDE, POTASSIUM CHLORIDE, SODIUM LACTATE AND CALCIUM CHLORIDE: 600; 310; 30; 20 INJECTION, SOLUTION INTRAVENOUS at 13:56

## 2023-05-04 RX ADMIN — PROPOFOL 160 MG: 10 INJECTION, EMULSION INTRAVENOUS at 14:02

## 2023-05-04 ASSESSMENT — FIBROSIS 4 INDEX: FIB4 SCORE: 0.77

## 2023-05-04 NOTE — ANESTHESIA PROCEDURE NOTES
Airway    Date/Time: 5/4/2023 2:04 PM  Performed by: Cricket Motley M.D.  Authorized by: Cricket Motley M.D.     Location:  OR  Urgency:  Elective  Indications for Airway Management:  Anesthesia      Spontaneous Ventilation: absent    Sedation Level:  Deep  Preoxygenated: Yes    Final Airway Type:  Supraglottic airway  Final Supraglottic Airway:  Standard LMA    SGA Size:  4  Number of Attempts at Approach:  1

## 2023-05-04 NOTE — ANESTHESIA PROCEDURE NOTES
Peripheral Block    Date/Time: 5/4/2023 1:55 PM  Performed by: Cricket Motley M.D.  Authorized by: Cricket Motley M.D.     Patient Location:  Pre-op  Start Time:  5/4/2023 1:55 PM  End Time:  5/4/2023 1:58 PM  Reason for Block: at surgeon's request and post-op pain management ONLY    patient identified, IV checked, site marked, risks and benefits discussed, surgical consent, monitors and equipment checked, pre-op evaluation and timeout performed    Patient Position:  Supine  Prep: ChloraPrep    Monitoring:  Heart rate, continuous pulse ox and cardiac monitor  Block Region:  Upper Extremity  Upper Extremity - Block Type:  BRACHIAL PLEXUS block, Interscalene approach    Laterality:  Right  Procedures: ultrasound guided  Image captured, interpreted and electronically stored.  Local Infiltration:  Lidocaine  Strength:  1 %  Dose:  3 ml  Block Type:  Single-shot  Needle Length:  100mm  Needle Gauge:  21 G  Needle Localization:  Ultrasound guidance  Injection Assessment:  Negative aspiration for heme, no paresthesia on injection, incremental injection and local visualized surrounding nerve on ultrasound  Evidence of intravascular injection: No     US Guided Interscalene Brachial Plexus Block   US transducer placed on the neck in oblique plane approximately at the level of C6.  Anterior and Middle Scalene (MSM) muscles identified with nerve trunks identified between the muscles.  Needle inserted lateral to probe and advanced under direct visualization through the MSM into a perineural position.  After negative aspiration, LA injected with ease and visualized surrounding the nerve trunks.

## 2023-05-04 NOTE — ANESTHESIA TIME REPORT
Anesthesia Start and Stop Event Times     Date Time Event    5/4/2023 1356 Anesthesia Start     1357 Ready for Procedure     1511 Anesthesia Stop        Responsible Staff  05/04/23    Name Role Begin End    Cricket Motley M.D. Anesth 1356 1511        Overtime Reason:  no overtime (within assigned shift)    Comments:

## 2023-05-04 NOTE — ANESTHESIA PREPROCEDURE EVALUATION
Case: [id number] Date/Time: 05/04/23 5795    Procedure: Right shoulder arthroscopic rotator cuff repair, subacromial decompression, distal clavicle, excision, labral debridement with repairs as indicated (Right)    Anesthesia type: General    Diagnosis:       Right rotator cuff tear [M75.101]      Right shoulder pain [M25.511]      Tear of right supraspinatus tendon [M75.101]      Infraspinatus tendon tear, right, initial encounter [S46.451B]    Pre-op diagnosis: Right rotator cuff tear [M75.101]    Location:  OR  / SURGERY [facility]    Surgeons: Devante Lemon M.D.          Relevant Problems   ANESTHESIA   (positive) Delayed emergence from anesthesia      CARDIAC   (positive) Essential hypertension   (positive) Hemorrhoids   (positive) Varicose vein         (positive) CKD (chronic kidney disease), stage I   (positive) Renal cyst       Physical Exam    Airway   Mallampati: II  TM distance: >3 FB  Neck ROM: full       Cardiovascular - normal exam  Rhythm: regular  Rate: normal     Dental - normal exam           Pulmonary - normal exam     Abdominal    Neurological - normal exam                 Anesthesia Plan    ASA 3   ASA physical status 3 criteria: CVA or TIA - history (> 3 months)    Plan - general and peripheral nerve block     Peripheral nerve block will be post-op pain control  Airway plan will be LMA          Induction: intravenous    Postoperative Plan: Postoperative administration of opioids is intended.    Pertinent diagnostic labs and testing reviewed    Informed Consent:    Anesthetic plan and risks discussed with patient.    Use of blood products discussed with: patient whom consented to blood products.          Pauline Mayo is a 51 y. o.female presents with   Chief Complaint   Patient presents with    Follow-up     65-year-old female presents today in office for follow-up related to complain of increasing anxiety back in August 2021. Patient states that she is yet to make an appointment despite the referral for counseling. She denies any homicidal or suicidal ideation. Subjective:           Past Medical History:   Diagnosis Date    Fibromyalgia     IBS (irritable bowel syndrome)     Psoriasis     Psoriatic arthritis (HCC)     Right hand pain      Past Surgical History:   Procedure Laterality Date    HX ORTHOPAEDIC      ankle    HX WISDOM TEETH EXTRACTION       Social History     Socioeconomic History    Marital status:      Spouse name: Not on file    Number of children: Not on file    Years of education: Not on file    Highest education level: Not on file   Tobacco Use    Smoking status: Never Smoker    Smokeless tobacco: Never Used   Vaping Use    Vaping Use: Never used   Substance and Sexual Activity    Alcohol use: Yes     Comment: social    Drug use: Never     Social Determinants of Health     Financial Resource Strain:     Difficulty of Paying Living Expenses:    Food Insecurity:     Worried About Running Out of Food in the Last Year:     Ran Out of Food in the Last Year:    Transportation Needs:     Lack of Transportation (Medical):      Lack of Transportation (Non-Medical):    Physical Activity:     Days of Exercise per Week:     Minutes of Exercise per Session:    Stress:     Feeling of Stress :    Social Connections:     Frequency of Communication with Friends and Family:     Frequency of Social Gatherings with Friends and Family:     Attends Bahai Services:     Active Member of Clubs or Organizations:     Attends Club or Organization Meetings:     Marital Status:      Current Outpatient Medications   Medication Sig Dispense Refill    gabapentin (NEURONTIN) 300 mg capsule 2 cap po tid 180 Capsule 1    pregabalin (LYRICA) 100 mg capsule TAKE 1 CAPSULE BY MOUTH TWICE DAILY. MAX DAILY AMOUNT: 200  Capsule 1    ergocalciferol (ERGOCALCIFEROL) 1,250 mcg (50,000 unit) capsule Take 1 Capsule by mouth every seven (7) days. 12 Capsule 1    Xeljanz XR 11 mg Tb24       Bifidobacterium Infantis (Align) 4 mg cap 1 cap po once daily 90 Cap 1    albuterol (PROVENTIL HFA, VENTOLIN HFA, PROAIR HFA) 90 mcg/actuation inhaler 2 puffs q4h prn sob 1 Inhaler 2     Allergies   Allergen Reactions    Penicillins Hives     The patient has a family history of    REVIEW OF SYSTEMS  Review of Systems   Musculoskeletal: Positive for back pain and joint pain. Negative for myalgias and neck pain. Psychiatric/Behavioral: Negative for depression and suicidal ideas. The patient is nervous/anxious. The patient does not have insomnia. Objective:     Visit Vitals  /86 (BP 1 Location: Left upper arm, BP Patient Position: Sitting, BP Cuff Size: Adult)   Pulse 96   Temp 98.2 °F (36.8 °C) (Temporal)   Ht 5' 1\" (1.549 m)   Wt 200 lb (90.7 kg)   SpO2 97%   BMI 37.79 kg/m²       Current Outpatient Medications   Medication Instructions    albuterol (PROVENTIL HFA, VENTOLIN HFA, PROAIR HFA) 90 mcg/actuation inhaler 2 puffs q4h prn sob    Bifidobacterium Infantis (Align) 4 mg cap 1 cap po once daily    ergocalciferol (ERGOCALCIFEROL) 50,000 Units, Oral, EVERY 7 DAYS    gabapentin (NEURONTIN) 300 mg capsule 2 cap po tid    pregabalin (LYRICA) 100 mg capsule TAKE 1 CAPSULE BY MOUTH TWICE DAILY. MAX DAILY AMOUNT: 200 MG    Xeljanz XR 11 mg Tb24 No dose, route, or frequency recorded. PHYSICAL EXAM  Physical Exam  Constitutional:       Appearance: Normal appearance. Neurological:      Mental Status: She is alert and oriented to person, place, and time. Psychiatric:         Mood and Affect: Mood normal.         Behavior: Behavior normal.         Thought Content:  Thought content normal.         Judgment: Judgment normal.         Assessment/Plan:     Diagnoses and all orders for this visit:    1. Anxiety    I requested that the patient contact the counseling office today that she was referred to and schedule an appointment ASAP advise the patient     If at any time you feel unsafe and may hurt yourself or others, either call '[phone]' or go to the nearest emergency room. Contact the nearest Hospital Emergency Room in cases that result in a medical emergency. Contact a friend / family member to discuss what you are feeling and solicit support. Disclaimer:    I have discussed the diagnosis with the patient and the intended plan as seen above. The patient understands our medical plan. The risks, benefits and significant side effects of all medications have been reviewed. Anticipated time course and progression of condition reviewed. All questions have been addressed. She received an after visit summary, with information reviewed, and questions answered. Where appropriate, she is instructed to call the clinic if she has not been notified either by phone or through [address] with the results of her tests or with an appointment plan for any referrals within 1 week(s). The patient  is to call if her condition worsens or fails to improve or if significant side effects are experienced.        Thu Priest NP

## 2023-05-04 NOTE — OP REPORT
DATE OF SERVICE: 5/4/23    PREOPERATIVE DIAGNOSIS: right shoulder rotator cuff tear, type 2 acromion, SLAP tear, AC DJD     POSTOPERATIVE DIAGNOSIS: right shoulder rotator cuff tear, type 2 acromion, biceps tendinopathy, SLAP tear, AC DJD, unstable cartilage flap     PROCEDURE PERFORMED:  right shoulder arthroscopic rotator cuff repair, subacromioal decompression, biceps tenotomy, distal clavicle excision, chondroplasty, with labral debridement.      SURGEON: Devante Lemon MD    ASSISTANT: Ivett MARCOS    ANESTHESIA: General with interscalene block for posterior pain control.     ANESTHESIOLOGIST: MD Tolu    ANTIBIOTICS: Ancef    COMPLICATIONS: None.     IMPLANTS: [employer] Omega PEEK knotless x 3    INDICATIONS: The patient presents with right shoulder pain recalcitrant to conservative treatment. The patient has evidence of a right shoulder rotator cuff tear, type 2 acromion, labral tear, and biceps tendinitis on imaging. Options were discussed, including both non operative and operative treatments. Risks of surgery were discussed at length. All questions were answered. No guarantees were given.     PROCEDURE IN DETAIL: The patient was properly identified in the preoperative holding area, and the right shoulder was marked as the correct surgical site. The patient was then taken back to the operative room, and placed supine on the operating room table and underwent general anesthesia. The patient was placed in a beach chair position. The right upper extremity was sterilely prepped and draped in standard fashion.     A standard posterior portal was created. The scope was placed up the glenohumeral joint. An anterior portal was created through the rotator cuff interval just below the biceps. The was evidence of a SLAP tear, degenerative type. There was no evidence of biceps tendinosis. There was evidence of a full-thickness rotator cuff tear. An extensive debridement was performed including a labral debridement  back to a stable smooth edge as well as a chondroplasty of unstable cartilage flap on humeral head and biceps tenotomy. The rest of the joint was inspected. There was moderate chondromalacia both in the humeral head and mild on the glenoid. No loose bodies were seen.    The scope was then placed in the subacromial space. A lateral portal was created. A bursectomy was performed, exposing the anterior and lateral acromion. A 5/5 resector was used to perform a subacromial decompression removing several millimeters of bone off the anterior and lateral acromion.  A distal clavicle excision was performed greater than the width of the 5.5 resector.  At this point, the rotator cuff was mobilized. A small boy trough was created for the bony footprint. 3 horizontal tapes were used followed by 3 [place] Omega PEEK anchors. This achieved good fixation, was probed and stable, checked in range of motion and stable. Excess fluid was drained. Incision were closed with 2-0 vicryl and 3-0 nylon. A total of 20ml 1% marcaine with epinephrine was injected. Ivett MARCOS  was present throughout the operation as an essential part of the success of there operation assisting with patient position, instrumentation, retraction and closure.     The patient was extubated and transferred to the recovery room in stable condition.

## 2023-05-04 NOTE — H&P
Surgery Orthopedic History & Physical Note    Date  5/4/2023    Primary Care Physician  Gordon Long M.D.      Pre-Op Diagnosis Codes:     * Right rotator cuff tear [M75.101]     * Right shoulder pain [M25.511]     * Tear of right supraspinatus tendon [M75.101]     * Infraspinatus tendon tear, right, initial encounter [S46.811A]    HPI  This is a 68 y.o. female who presented with right shoulder pain and weakness secondary to rotator cuff tear.  She elects to undergo right shoulder arthroscopy with repairs as indicated.    Past Medical History:   Diagnosis Date    Anesthesia     slow to wake, takes several days to clear, fentanly slow wake up    Arrhythmia     Arthritis     Blood clotting disorder (HCC) 1981    leg    Cataract     bilateral IOL    Chronic back pain     Cold sore 8/1/2016    Heart burn     Listeria meningitis 1985    NEGATIVE HISTORY OF     Pain     wrist    Pain     right knee    Pins and needles sensation     top of head  relieved with  tums    Renal disorder 2016    large left kidney cyst    Snoring     TIA (transient ischemic attack) 10/2015    possible    TIA (transient ischemic attack) 2015    possible?        Past Surgical History:   Procedure Laterality Date    PB KNEE SCOPE,DIAGNOSTIC Right 6/4/2020    Procedure: ARTHROSCOPY, KNEE;  Surgeon: Valentín Draper M.D.;  Location: [facility];  Service: Orthopedics    MENISCECTOMY, KNEE, MEDIAL Right 6/4/2020    Procedure: MENISCECTOMY, KNEE, MEDIAL,LATERAL;  Surgeon: Valentín Draper M.D.;  Location: [facility];  Service: Orthopedics    ORIF, WRIST Right 10/9/2019    Procedure: ORIF, WRIST- DISTAL RADIUS;  Surgeon: Kwabena Michelle M.D.;  Location: [facility];  Service: Orthopedics    LIGAMENT REPAIR Right 10/9/2019    Procedure: REPAIR, LIGAMENT- SCAPHOLUNATE;  Surgeon: Kwabena Michelle M.D.;  Location: [facility];  Service: Orthopedics    HEMORRHOIDECTOMY  8/5/2016    Procedure:  HEMORRHOIDECTOMY SINGLE QUADRANT;  Surgeon: Alpesh Gregory M.D.;  Location: SURGERY [facility];  Service:     OTHER  2015    varicose vein right leg    OTHER  2015    cataracts bilateral    OTHER  2010    breast biopsy    BREAST BIOPSY      TONSILLECTOMY      TUBAL LIGATION         Current Facility-Administered Medications   Medication Dose Route Frequency Provider Last Rate Last Admin    acetaminophen (TYLENOL) tablet 1,000 mg  1,000 mg Oral Once Cricket Motley M.D.        ceFAZolin (ANCEF) injection 2 g  2 g Intravenous Once Devante Lemon M.D.        lidocaine (XYLOCAINE) 1 % injection 0.5 mL  0.5 mL Intradermal Once PRN Devante Lemon M.D.        lactated ringers infusion   Intravenous Continuous Devante Lemon M.D.           Social History     Socioeconomic History    Marital status:      Spouse name: Not on file    Number of children: Not on file    Years of education: Not on file    Highest education level: Not on file   Occupational History    Occupation:    Tobacco Use    Smoking status: Passive Smoke Exposure - Never Smoker    Smokeless tobacco: Never   Vaping Use    Vaping Use: Never used   Substance and Sexual Activity    Alcohol use: Yes     Comment: 1-2 per week    Drug use: No    Sexual activity: Not on file   Other Topics Concern    Not on file   Social History Narrative    Not on file     Social Determinants of Health     Financial Resource Strain: Not on file   Food Insecurity: Not on file   Transportation Needs: Not on file   Physical Activity: Not on file   Stress: Not on file   Social Connections: Not on file   Intimate Partner Violence: Not on file   Housing Stability: Not on file       Family History   Problem Relation Age of Onset    Heart Disease Other     Arthritis Neg Hx     Cancer Neg Hx        Allergies  Penicillins, Percocet [apap-fd&c yellow #6 [place]-oxycodone], Ammonia, Arnica, Erythromycin, Food, Lipitor [atorvastatin calcium], Lisinopril, Other environmental,  Other misc, Tape, and Vancomycin    Review of Systems  Negative    Physical Exam  Lungs:  CTA bilat  CV:  RRR  Right shoulder:  pain and weakness with range of motion  Vital Signs  Blood Pressure : 139/66   Temperature: 36.5 °C (97.7 °F)   Pulse: 72   Respiration: 20   Pulse Oximetry: 96 %       Labs:  Recent Labs     05/03/23 0923   WBC 6.4   RBC 4.68   HEMOGLOBIN 13.2   HEMATOCRIT 42.0   MCV 89.7   MCH 28.2   MCHC 31.4*   RDW 51.1*   PLATELETCT 363   MPV 10.9     Recent Labs     05/03/23 0923   SODIUM 140   POTASSIUM 4.0   CHLORIDE 103   CO2 24   GLUCOSE 87   BUN 15   CREATININE 0.70   CALCIUM 9.3         Recent Labs     05/03/23 0923   ASTSGOT 18   ALTSGPT 20   TBILIRUBIN 0.3   ALKPHOSPHAT 61   GLOBULIN 3.1       Radiology:  No orders to display         Assessment/Plan:  Pre-Op Diagnosis Codes:     * Right rotator cuff tear [M75.101]     * Right shoulder pain [M25.511]     * Tear of right supraspinatus tendon [M75.101]     * Infraspinatus tendon tear, right, initial encounter [S46.811A]  Procedure(s):  Right shoulder arthroscopic rotator cuff repair, subacromial decompression, distal clavicle, excision, labral debridement with repairs as indicated

## 2023-05-04 NOTE — OR NURSING
1509- Patient arrived to [facility] from OR via [facility]. Report received from anesthesia and RN. Respirations are spontaneous and unlabored VSS on 6 L via mask. Dressing is CDI. RUE elevated. Cold pack applied. RUE: radial pulse 2+ cap refill less than 3 seconds, warm, pink.     1524-Pt given water.     1539-Room air trial. Pt oxygen 89-91% Given IS and instructed on use with goal of 2000, pt currently inhaling volumes of approx 1200.  Updated the family.     1554-Encouraging IS. Oxygen decreased to 87%. Placed the patient on 2L via NC.     1609-No change. Pt continues to use IS and work on deep breathing and coughing.     1624-No change. Pt continues to use IS and work on deep breathing and coughing. Pt given ginger ale.     1639- Pt dressed and ambulated to the bathroom on oxygen with full tank.     1654-Settled in recliner after ambulation to the bathroom. Re-updated the .     1709- No change in surgical site assessment.     1717-Patient met criteria for transfer to stage II via [facility] with CNA. assist.  Patient states good pain control. Denies N/V, tolerating PO well. Surgical dressing CDI. Ice pack sent with pt. Report called to Ilene

## 2023-05-04 NOTE — OR NURSING
Patient allergies and NPO status verified, home medication reconciliation completed and belongings secured. Pt verbalizes understanding of pain scale, expected course of stay, and plan of care. Surgical site verified w/ pt. IV access established. Sequentials placed on legs.

## 2023-05-04 NOTE — ANESTHESIA POSTPROCEDURE EVALUATION
Patient: Jodi Lopez    Procedure Summary     Date: 05/04/23 Room / Location:  OR 06 / SURGERY [facility]    Anesthesia Start: 1356 Anesthesia Stop: 1511    Procedure: Right shoulder arthroscopic rotator cuff repair, subacromial decompression, distal clavicle, excision, labral debridement (Right: Shoulder) Diagnosis:       Right rotator cuff tear      Right shoulder pain      Tear of right supraspinatus tendon      Infraspinatus tendon tear, right, initial encounter      (Right rotator cuff tear [M75.101])    Surgeons: Devante Lemon M.D. Responsible Provider: Cricket Motley M.D.    Anesthesia Type: general, peripheral nerve block ASA Status: 3          Final Anesthesia Type: general, peripheral nerve block  Last vitals  BP   Blood Pressure : 118/67    Temp   36 °C (96.8 °F)    Pulse   72   Resp   18    SpO2   91 %      Anesthesia Post Evaluation    Patient location during evaluation: PACU  Patient participation: complete - patient participated  Level of consciousness: awake and alert    Airway patency: patent  Anesthetic complications: no  Cardiovascular status: hemodynamically stable  Respiratory status: acceptable  Hydration status: euvolemic    PONV: none          No notable events documented.     Nurse Pain Score: 0 (NPRS)

## 2023-05-04 NOTE — LETTER
February 9, 2023    Patient Name: Jodi Lopez  Surgeon Name: Devante Lemon M.D.  Surgery Facility: [facility] ([address] [facility])  Surgery Date: 5/4/2023    The time of your surgery is not final and may change up to and until the day of your surgery. You will be contacted 24-48 hours prior to your surgery date with your check-in and surgery time.    If you will not be at one of the below numbers please call the surgery scheduler at [phone]  Preferred Phone: [phone]    BEFORE YOUR SURGERY   Pre Registration and/or Lab Work must be done within and no earlier than 28 days prior to your surgery date. Please call [facility] at [phone] for an appointment as soon as possible.    DAY OF YOUR SURGERY  Nothing to eat or drink after midnight     Refrain from smoking any substance after midnight prior to surgery. Smoking may interfere with the anesthetic and frequently produces nausea during the recovery period.    Continue taking all lifesaving medications. Including the morning of your surgery with small sip of water.    Please do NOT take on the day of surgery:  Diuretics: examples- furosemide (Lasix), spironolactone, hydrochlorothiazide  ACE-inhibitors: examples- lisinopril, ramipril, enalapril  “ARBs”: examples- losartan, Olmesartan, valsartan    Please arrive at the hospital/surgery center at the check-in time provided.     An adult will need to bring you and take you home after your surgery.     AFTER YOUR SURGERY  Post op Appointment:   Date: 05/17/2023   Time: 09:30am   With: Devante Lemon M.D.   Location: [address] [place] [address]    - Therapy- Your first appointment should be 3  day(s) after your surgery. For your convenience we have 4 Physical Therapy locations: [facility], [place], and [facility]. Call our office ASAP to schedule an appointment at [phone] or take the enclosed Therapy  Prescription to a facility of your choice.  - Post Surgery - You will need someone to drive you home  - Post Surgery - You will need someone to stay with you for 24 hours  - You must have someone provide transportation post surgery and someone to monitor you for at least 24 hours post-surgery. If you don't have either of these your appointment will be canceled.    TIME OFF WORK  FMLA or Disability forms can be faxed directly to: [phone] or you may drop them off at [address] [place] [address]. Our office charges a $35.00 fee per form. Forms will be completed within 10 business days of drop off and payment received. For the status of your forms you may contact our disability office directly [phone].    MEDICATION INSTRUCTIONS **Please read section completely**    The following medications should be stopped a minimum of 10 days prior to surgery:  All over the counter, Supplements & Herbal medications    Anorectics: Phentermine (Adipex-P, Lomaira and Suprenza), Phentermine-topiramate (Qsymia), Bupropion-naltrexone (Contrave)    Opiod Partial Agonists/Opioid Antagonists: Buprenorphine (Subocone, Belbuca, Butrans, Probuphine Implant, Sublocade), Naltrexone (ReVia, Vivitrol), Naloxone    Amphetamines: Dextroamphetamine/Amphetamine (Adderall, Mydayis), Methylphenidate Hydrochloride (Concerta, Metadate, Methylin, Ritalin)    The following medications should be stopped 5 days prior to surgery:  Blood Thinners: Any Aspirin, Aspirin products, anti-inflammatories such as ibuprofen and any blood thinners such as Coumadin and Plavix. Please consult your prescribing physician if you are on life saving blood thinners, in regards to when to stop medications prior to surgery.     The following medications should be stopped a minimum of 3 days prior to surgery:  PDE-5 inhibitors: Sildenafil (Viagra), Tadalafil (Cialis), Vardenafil (Levitra), Avanafil (Stendra)    MAO Inhibitors: Rasagiline (Azilect), Selegiline  (Eldepryl, Emsam, Selapar), Isocarboxazid (Marplan), Phenelzine (Nardil)

## 2023-05-04 NOTE — DISCHARGE INSTRUCTIONS
-Ice as needed.     -Keep arm in Ultrasling except for showering, physical therapy, getting dressed, and daily Comans exercises.  Must sleep in Ultrasling.    -Ok to shower.   Keep original dressings on and dry for 3 days.    -May change to band-aids after 3 days.    -Follow up as scheduled.   -Start physical therapy within 5 days.  No active Range of Motion, reaching, grabbing, pushing, pulling with the operative arm x 6 weeks.      What to Expect Post Anesthesia    Rest and take it easy for the first 24 hours.  A responsible adult is recommended to remain with you during that time.  It is normal to feel sleepy.  We encourage you to not do anything that requires balance, judgment or coordination.    FOR 24 HOURS DO NOT:  Drive, operate machinery or run household appliances.  Drink beer or alcoholic beverages.  Make important decisions or sign legal documents.    To avoid nausea, slowly advance diet as tolerated, avoiding spicy or greasy foods for the first day.  Add more substantial food to your diet according to your provider's instructions.  INCREASE FLUIDS AND FIBER TO AVOID CONSTIPATION.    MILD FLU-LIKE SYMPTOMS ARE NORMAL.  YOU MAY EXPERIENCE GENERALIZED MUSCLE ACHES, THROAT IRRITATION, HEADACHE AND/OR SOME NAUSEA.    If any questions arise, call your provider.  If your provider is not available, please feel free to call the Surgical Center at [phone].    MEDICATIONS: Resume taking daily medication.  Take prescribed pain medication with food.  If no medication is prescribed, you may take non-aspirin pain medication if needed.  PAIN MEDICATION CAN BE VERY CONSTIPATING.  Take a stool softener or laxative such as senokot, pericolace, or milk of magnesia if needed.    Last pain medication given at     Diet    Resume your normal diet as tolerated.  A diet low in cholesterol, fat, and sodium is recommended for good health.     GENERAL ORTHO DISCHARGE INSTRUCTIONS:    ACTIVITY: RIGHT ARM:  NON-WEIGHT  BEARING    ASSISTED DEVICES: You are being discharged with the following special equipment:  Ultrasling    ICE: Apply ice packs to the affected area (15 minutes on the arm, 15 minutes off the arm) for the first week, as you feel necessary to help with the pain and swelling.    ELEVATION: Keep your extremity elevated as much as possible, above your heart, to help control pain and swelling for at least 2 weeks. If the sensation in your fingers of your operative extremity changes, or the fingers change colors, or should your pain become too difficult to control, you should immediately elevate your operative extremity.  If these symptoms do not resolve after 30 minutes to 1 hour, you should seek medical care immediately.      Peripheral Nerve Block Discharge Instructions from Same Day Surgery and Inpatient :    What to Expect - Upper Extremity  You may experience numbness and weakness in shoulder, arm, and hand  on the same side as your surgery  This is normal. For some people, this may be an unpleasant sensation. Be very careful with your numb limb  Ask for help when you need it  Shoulder Surgery Side Effects  In addition to numbness and weakness you may experience other symptoms  Other nerves that are close to those nerves injected can also be affected by local anesthesia  You may experience a hoarseness in your voice  Your breathing may feel different  You may also notice drooping of your eyelid, pupil constriction, and decreased sweating, on the side of your surgery  All of these side effects are normal and will resolve when the local anesthetic wears off   Prevent Injury  Protect the limb like a baby  Beware of exposing your limb to extreme heat or cold or trauma  The limb may be injured without you noticing because it is numb  Keep the limb elevated whenever possible  Do not sleep on the limb  Change the position of the limb regularly  Avoid putting pressure on your surgical limb  Pain Control  The initial block  "on the day of surgery will make your extremity feel \"numb\"  Any consecutive injection including prior to discharge from the hospital will make your extremity feel \"numb\"  You may feel an aching or burning when the local anesthesia starts to wear off  Take pain pills as prescribed by your surgeon  Call your surgeon or anesthesiologist if you do not have adequate pain control    "

## 2023-05-05 LAB — NUCLEAR IGG SER QL IA: NORMAL

## 2023-05-05 NOTE — OR NURSING
1723: Pt arrive to stage 2 via [place]. Dressed and up to chair. Denies pain and nausea. Stable on RA. Family present.    1800: Tolu in stage 2 to see pt. New order received, pt ok to DC home with O2 sat 88% or higher.    1816: DC education provided to pt and pt family, both verbalized understanding. D/Balwinder to care of family post uneventful stay in PACU 2. Assisted out to car in .

## 2023-07-12 ENCOUNTER — OFFICE VISIT (OUTPATIENT)
Dept: RHEUMATOLOGY | Facility: MEDICAL CENTER | Age: 68
End: 2023-07-12
Attending: INTERNAL MEDICINE
Payer: MEDICARE

## 2023-07-12 VITALS
DIASTOLIC BLOOD PRESSURE: 82 MMHG | HEIGHT: 64 IN | TEMPERATURE: 95.9 F | HEART RATE: 82 BPM | SYSTOLIC BLOOD PRESSURE: 136 MMHG | BODY MASS INDEX: 38.93 KG/M2 | OXYGEN SATURATION: 95 % | WEIGHT: 228 LBS

## 2023-07-12 DIAGNOSIS — Z79.899 LONG-TERM USE OF PLAQUENIL: ICD-10-CM

## 2023-07-12 DIAGNOSIS — M05.79 RHEUMATOID ARTHRITIS INVOLVING MULTIPLE SITES WITH POSITIVE RHEUMATOID FACTOR (HCC): ICD-10-CM

## 2023-07-12 DIAGNOSIS — Z79.1 NSAID LONG-TERM USE: ICD-10-CM

## 2023-07-12 PROCEDURE — 3075F SYST BP GE 130 - 139MM HG: CPT | Performed by: INTERNAL MEDICINE

## 2023-07-12 PROCEDURE — 99214 OFFICE O/P EST MOD 30 MIN: CPT | Performed by: INTERNAL MEDICINE

## 2023-07-12 PROCEDURE — 99212 OFFICE O/P EST SF 10 MIN: CPT | Performed by: INTERNAL MEDICINE

## 2023-07-12 PROCEDURE — 3079F DIAST BP 80-89 MM HG: CPT | Performed by: INTERNAL MEDICINE

## 2023-07-12 ASSESSMENT — FIBROSIS 4 INDEX: FIB4 SCORE: 0.77

## 2023-07-12 ASSESSMENT — PATIENT HEALTH QUESTIONNAIRE - PHQ9: CLINICAL INTERPRETATION OF PHQ2 SCORE: 0

## 2023-07-12 NOTE — PROGRESS NOTES
Chief Complaint- joint pain     Subjective:   Jodi Lopez is a 68 y.o. female here today for follow up of rheumatological issues      This is a follow-up visit, second visit with us for this patient who was referred to rheumatology for polyarthralgias and a low positive rheumatoid factor.  At last visit patient was started on Plaquenil at 200 mg p.o. twice daily, patient comes in today stating that she has had some medication adjustments i.e. her amlodipine has been adjusted and she no longer gets hands or feet swelling, patient wonders if maybe she can taper down the Plaquenil to see if she still needs the hydroxychloroquine.  Patient does have an eye exam pending at [facility].    Since last visit patient status post right TSA     Comorbidities include chronic low back pain status post evaluation by neurosurgery at [facility]    Right TSA     PMHx  Right RCT pending Dr Lemon   Left Renal cyst   Tonsillectomy  Tubal ligation  Varicose veins removed  hemmroidectomy  Cataract surgery  meniscal surgery right knee  Chronic low pain DDD/DJD followed at [facility] ([facility] [place]) s/p eval by neurosurgery     Fam Hx  M-passed CAD  F-passed CAD  Bx2 alive and well     Soc Hx  No tobacco  ETOH 2x/week  No blood tx  No tattoos     G6PD 15.8 adequate 4/2023  RF 17.4 1/2023 [facility]  CCP neg 1/2023 [facility]     Hand X-rays 4/2023-IMPRESSION:  Mild arthritic changes. Question marginal erosions PIP joint of the left third finger.     Feet X-rays 4/2023-IMPRESSION:  Mild arthritic changes. No definite erosive changes.    Current Outpatient Medications   Medication Sig Dispense Refill    Acetaminophen (TYLENOL ARTHRITIS PAIN PO) Take 650 mg by mouth.      celecoxib (CELEBREX) 100 MG Cap Take 100 mg by mouth.      estradiol (VAGIFEM) 10 MCG Tab Insert 10 mg into the vagina.      methocarbamol (ROBAXIN) 750 MG Tab Take 750 mg by mouth as needed.      hydroxychloroquine (PLAQUENIL) 200 MG Tab 1  tab po bid 180 Tablet 1    Loratadine (ALAVERT PO) Take  by mouth as needed.      ketoconazole (NIZORAL) 2 % shampoo Apply  to affected area(s) 1 time daily as needed for Itching.      Multiple Vitamins-Minerals (AIRBORNE GUMMIES PO) Take  by mouth every day.      ascorbic acid (ASCORBIC ACID) 500 MG Tab Take 500 mg by mouth every day.      Non Formulary Request every day. OTC Nutri Calm      Non Formulary Request as needed. OTC Papaya Mint      Glucosamine HCl-MSM (GLUCOSAMINE-MSM PO) Take 1 Tab by mouth every day.      Diclofenac Sodium 1 % Gel Apply 1 Application to skin as directed 2 times a day as needed (PAIN).      Magnesium 400 MG Tab Take 1 Tablet by mouth every day.      vitamin D (CHOLECALCIFEROL) 1000 UNIT TABS Take 1,000 Units by mouth every day.        COVID-19 Test (ID NOW COVID-19 2.0 TEST) Kit ID NOW COVID-19 Test Kit   TEST AS DIRECTED TODAY      Calcium Carbonate Antacid (TUMS ULTRA 1000) 1000 MG Chew Tab  (Patient not taking: Reported on 7/12/2023)      Coenzyme Q10 100 MG Cap Take  by mouth every day. (Patient not taking: Reported on 4/24/2023)      Bromelains 500 MG Tab Take 1 Tab by mouth every day. (Patient not taking: Reported on 4/24/2023)      calcium carbonate (TUMS) 500 MG Chew Tab Take 500 mg by mouth 3 times a day as needed.      Calcium-Magnesium-Vitamin D (CALCIUM MAGNESIUM PO) Take 1 Tab by mouth every day. (Patient not taking: Reported on 4/24/2023)       No current facility-administered medications for this visit.     She  has a past medical history of Anesthesia, Arrhythmia, Arthritis, Blood clotting disorder (HCC) (1981), Cataract, Chronic back pain, Cold sore (8/1/2016), Heart burn, Listeria meningitis (1985), NEGATIVE HISTORY OF, Pain, Pain, Pins and needles sensation, Renal disorder (2016), Snoring, TIA (transient ischemic attack) (10/2015), and TIA (transient ischemic attack) (2015).    ROS   Other than what is mentioned in HPI or physical exam, there is no history of  "headaches, double vision or blurred vision.  No trouble swallowing difficulties .  No chest complaints including chest pain, cough or sputum production. No GI complaints including nausea, vomiting, change in bowel habits, or past peptic ulcer disease. No history of blood in the stools. No urinary complaints including dysuria or frequency. No history of alopecia, photosensitivity     Objective:     /82 (BP Location: Left arm, Patient Position: Sitting, BP Cuff Size: Adult)   Pulse 82   Temp (!) 35.5 °C (95.9 °F) (Temporal)   Ht 1.613 m (5' 3.5\")   Wt 103 kg (228 lb)   SpO2 95%  Body mass index is 39.75 kg/m².   Physical Exam:    Constitutional: Alert and oriented X3, patient is talkative with good eye contact.Skin: Warm, dry, good turgor, no rashes in visible areas.Eye: Equal, round and reactive, conjunctiva clear, lids normal EOM intactENMT: Lips without lesions,  pinna without deformityNeck: Trachea midline, no masses, no thyromegaly.Lymph:  No cervical lymphadenopathy, no axillary lymphadenopathy, no supraclavicular lymphadenopathyRespiratory: Unlabored respiratory effort, lungs clear to auscultation, no wheezes, no ronchi.Cardiovascular: Normal S1, S2, Regular rate and rhythm, no murmurs rubs or gallops  .Abdomen: Soft, non-distended, patient overweight.Psych: Alert and oriented x3, normal affect and mood.Neuro: Cranial nerves 2-12 are grossly intact Ext:no joint laxity noted in bilateral arms, no joint laxity noted in bilateral legs, no swan-neck or boutonniere deformities, limited range of motion especially of the right shoulder status post TSA, toes without crossover toes and without splay toes, crepitus in knees but no synovitis, there is a Ej's nodes right third finger but no synovitis appreciated.    Lab Results   Component Value Date/Time    SODIUM 140 05/03/2023 09:23 AM    POTASSIUM 4.0 05/03/2023 09:23 AM    CHLORIDE 103 05/03/2023 09:23 AM    CO2 24 05/03/2023 09:23 AM    GLUCOSE 87 " 05/03/2023 09:23 AM    BUN 15 05/03/2023 09:23 AM    CREATININE 0.70 05/03/2023 09:23 AM    CREATININE 0.82 11/27/2012 12:00 AM    BUNCREATRAT 24 (H) 11/27/2012 12:00 AM      Lab Results   Component Value Date/Time    WBC 6.4 05/03/2023 09:23 AM    RBC 4.68 05/03/2023 09:23 AM    HEMOGLOBIN 13.2 05/03/2023 09:23 AM    HEMATOCRIT 42.0 05/03/2023 09:23 AM    MCV 89.7 05/03/2023 09:23 AM    MCH 28.2 05/03/2023 09:23 AM    MCHC 31.4 (L) 05/03/2023 09:23 AM    MPV 10.9 05/03/2023 09:23 AM    NEUTSPOLYS 59.20 05/03/2023 09:23 AM    LYMPHOCYTES 27.40 05/03/2023 09:23 AM    MONOCYTES 10.00 05/03/2023 09:23 AM    EOSINOPHILS 2.20 05/03/2023 09:23 AM    BASOPHILS 0.90 05/03/2023 09:23 AM      Lab Results   Component Value Date/Time    CALCIUM 9.3 05/03/2023 09:23 AM    ASTSGOT 18 05/03/2023 09:23 AM    ALTSGPT 20 05/03/2023 09:23 AM    ALKPHOSPHAT 61 05/03/2023 09:23 AM    TBILIRUBIN 0.3 05/03/2023 09:23 AM    ALBUMIN 4.3 05/03/2023 09:23 AM    TOTPROTEIN 7.4 05/03/2023 09:23 AM     Lab Results   Component Value Date/Time    URICACID 4.9 05/03/2023 09:23 AM    ANTINUCAB None Detected 05/03/2023 09:23 AM     Lab Results   Component Value Date/Time    SEDRATEWES 18 04/24/2023 04:00 PM     Lab Results   Component Value Date/Time    HBA1C 5.8 (H) 03/02/2022 08:09 AM     Lab Results   Component Value Date/Time    G6PD 15.8 04/24/2023 04:00 PM     Assessment and Plan:     1. Rheumatoid arthritis involving multiple sites with positive rheumatoid factor (HCC)  Symptomatically improved with medication adjustment, patient wants to try cutting down her hydroxychloroquine we will decrease hydroxychloroquine from 200 mg a day to 20 mg p.o. daily, and reevaluate in about 3 months.    2. Long-term use of Plaquenil  Decrease Plaquenil from 200 mg p.o. twice daily to 200 mg p.o. daily, of note G6PD levels are adequate, patient does have an eye exam pending.  Patient will need monitoring labs about every 6 months.  Will order at next  visit.    3. NSAID long-term use  Patient uses Celebrex as needed, patient does need monitoring labs about every 6 months, will order at next visit.    Followup: Return in about 3 months (around 10/12/2023). or sooner aida Fajardo John  was seen 30 minutes face-to-face of which more than 50% of the time was spent counseling the patient (excluding time for procedures)  regarding  rheumatological condition and care. Therapy was discussed in detail.      Please note that this dictation was created using voice recognition software. I have made every reasonable attempt to correct obvious errors, but I expect that there are errors of grammar and possibly content that I did not discover before finalizing the note.

## 2023-07-21 ENCOUNTER — HOSPITAL ENCOUNTER (OUTPATIENT)
Dept: RADIOLOGY | Facility: MEDICAL CENTER | Age: 68
End: 2023-07-21
Payer: MEDICARE

## 2023-07-25 ENCOUNTER — HOSPITAL ENCOUNTER (OUTPATIENT)
Dept: RADIOLOGY | Facility: MEDICAL CENTER | Age: 68
End: 2023-07-25
Attending: INTERNAL MEDICINE
Payer: MEDICARE

## 2023-07-25 DIAGNOSIS — Z12.31 VISIT FOR SCREENING MAMMOGRAM: ICD-10-CM

## 2023-07-25 PROCEDURE — 77063 BREAST TOMOSYNTHESIS BI: CPT

## 2023-10-26 ENCOUNTER — OFFICE VISIT (OUTPATIENT)
Dept: RHEUMATOLOGY | Facility: MEDICAL CENTER | Age: 68
End: 2023-10-26
Attending: INTERNAL MEDICINE
Payer: MEDICARE

## 2023-10-26 VITALS
RESPIRATION RATE: 14 BRPM | OXYGEN SATURATION: 96 % | WEIGHT: 238 LBS | TEMPERATURE: 97.8 F | BODY MASS INDEX: 41.5 KG/M2 | HEART RATE: 72 BPM | SYSTOLIC BLOOD PRESSURE: 140 MMHG | DIASTOLIC BLOOD PRESSURE: 90 MMHG

## 2023-10-26 DIAGNOSIS — M05.79 RHEUMATOID ARTHRITIS INVOLVING MULTIPLE SITES WITH POSITIVE RHEUMATOID FACTOR (HCC): ICD-10-CM

## 2023-10-26 DIAGNOSIS — Z79.899 LONG-TERM USE OF PLAQUENIL: ICD-10-CM

## 2023-10-26 DIAGNOSIS — Z79.1 NSAID LONG-TERM USE: ICD-10-CM

## 2023-10-26 PROCEDURE — 3077F SYST BP >= 140 MM HG: CPT | Performed by: INTERNAL MEDICINE

## 2023-10-26 PROCEDURE — 99212 OFFICE O/P EST SF 10 MIN: CPT | Performed by: INTERNAL MEDICINE

## 2023-10-26 PROCEDURE — 99214 OFFICE O/P EST MOD 30 MIN: CPT | Performed by: INTERNAL MEDICINE

## 2023-10-26 PROCEDURE — 3080F DIAST BP >= 90 MM HG: CPT | Performed by: INTERNAL MEDICINE

## 2023-10-26 RX ORDER — HYDROXYCHLOROQUINE SULFATE 200 MG/1
TABLET, FILM COATED ORAL
Qty: 90 TABLET | Refills: 1 | Status: SHIPPED | OUTPATIENT
Start: 2023-10-26

## 2023-10-26 ASSESSMENT — FIBROSIS 4 INDEX: FIB4 SCORE: 0.77

## 2023-10-26 NOTE — PROGRESS NOTES
Chief Complaint- joint pain     Subjective:   Jodi Lopez is a 68 y.o. female here today for follow up of rheumatological issues    This is a follow-up visit, third visit with us for this patient who was referred to rheumatology for polyarthralgias and a low positive rheumatoid factor.  Patient states that since that time she has had an adjustment of her medications specifically her amlodipine which has significantly cut down the swelling in her hands and feet, at last visit we had decreased patient's Plaquenil from twice daily to daily, patient continues to feel fine and wonders about decreasing Plaquenil even further or possibly stopping.  Patient denies any side effects from the medication, denies any unexplained weight loss, denies any fevers of unknown etiology, denies any GI upset, denies any rashes, denies any new joint swelling, denies recurrent infections.  Patient states she did have an eye exam at [facility] summer 2023.    Since last visit patient status post right TSA     Comorbidities include chronic low back pain status post evaluation by neurosurgery at [facility]     Right TSA     PMHx  Right RCT pending Dr Leomn   Left Renal cyst   Tonsillectomy  Tubal ligation  Varicose veins removed  hemmroidectomy  Cataract surgery  meniscal surgery right knee  Chronic low pain DDD/DJD followed at [facility] ([facility] [place]) s/p eval by neurosurgery     Fam Hx  M-passed CAD  F-passed CAD  Bx2 alive and well     Soc Hx  No tobacco  ETOH 2x/week  No blood tx  No tattoos    Uric acid 4.9 nl 5/2023  LEONARDO neg 5/2023   G6PD 15.8 adequate 4/2023  RF 17.4 1/2023 [facility]  CCP neg 1/2023 [facility]     Hand X-rays 4/2023-IMPRESSION:  Mild arthritic changes. Question marginal erosions PIP joint of the left third finger.     Feet X-rays 4/2023-IMPRESSION:  Mild arthritic changes. No definite erosive changes.      Current Outpatient Medications   Medication Sig Dispense Refill     hydroxychloroquine (PLAQUENIL) 200 MG Tab 1 tab po qday 90 Tablet 1    Acetaminophen (TYLENOL ARTHRITIS PAIN PO) Take 650 mg by mouth.      COVID-19 Test (ID NOW COVID-19 2.0 TEST) Kit ID NOW COVID-19 Test Kit   TEST AS DIRECTED TODAY      Calcium Carbonate Antacid (TUMS ULTRA 1000) 1000 MG Chew Tab       celecoxib (CELEBREX) 100 MG Cap Take 100 mg by mouth.      estradiol (VAGIFEM) 10 MCG Tab Insert 10 mg into the vagina.      methocarbamol (ROBAXIN) 750 MG Tab Take 750 mg by mouth as needed.      Loratadine (ALAVERT PO) Take  by mouth as needed.      ketoconazole (NIZORAL) 2 % shampoo Apply  to affected area(s) 1 time daily as needed for Itching.      Coenzyme Q10 100 MG Cap Take  by mouth every day.      Multiple Vitamins-Minerals (AIRBORNE GUMMIES PO) Take  by mouth every day.      ascorbic acid (ASCORBIC ACID) 500 MG Tab Take 500 mg by mouth every day.      Non Formulary Request every day. OTC Nutri Calm      Non Formulary Request as needed. OTC Papaya Mint      Glucosamine HCl-MSM (GLUCOSAMINE-MSM PO) Take 1 Tab by mouth every day.      Diclofenac Sodium 1 % Gel Apply 1 Application to skin as directed 2 times a day as needed (PAIN).      Magnesium 400 MG Tab Take 1 Tablet by mouth every day.      Calcium-Magnesium-Vitamin D (CALCIUM MAGNESIUM PO) Take 1 Tablet by mouth every day.      vitamin D (CHOLECALCIFEROL) 1000 UNIT TABS Take 1,000 Units by mouth every day.        Bromelains 500 MG Tab Take 1 Tab by mouth every day. (Patient not taking: Reported on 4/24/2023)      calcium carbonate (TUMS) 500 MG Chew Tab Take 500 mg by mouth 3 times a day as needed.       No current facility-administered medications for this visit.     She  has a past medical history of Anesthesia, Arrhythmia, Arthritis, Blood clotting disorder ([facility]) (1981), Cataract, Chronic back pain, Cold sore (8/1/2016), Heart burn, Listeria meningitis (1985), NEGATIVE HISTORY OF, Pain, Pain, Pins and needles sensation, Renal disorder (2016),  Snoring, TIA (transient ischemic attack) (10/2015), and TIA (transient ischemic attack) (2015).    ROS   Other than what is mentioned in HPI or physical exam, there is no history of headaches, double vision or blurred vision.  No trouble swallowing difficulties .  No chest complaints including chest pain, cough or sputum production. No GI complaints including nausea, vomiting, change in bowel habits, or past peptic ulcer disease. No history of blood in the stools. No urinary complaints including dysuria or frequency. No history of alopecia, photosensitivity     Objective:     BP (!) 140/90   Pulse 72   Temp 36.6 °C (97.8 °F) (Temporal)   Resp 14   Wt 108 kg (238 lb)   SpO2 96%  Body mass index is 41.5 kg/m².   Physical Exam:    Constitutional: Alert and oriented X3, patient is talkative with good eye contact.Skin: Warm, dry, good turgor, no rashes in visible areas.Eye: Equal, round and reactive, conjunctiva clear, lids normal EOM intactENMT: Lips without lesions,  pinna without deformityNeck: Trachea midline, no masses, no thyromegaly.Lymph:  No cervical lymphadenopathy, no axillary lymphadenopathy, no supraclavicular lymphadenopathyRespiratory: Unlabored respiratory effort, lungs clear to auscultation, no wheezes, no ronchi.Cardiovascular: Normal S1, S2, Regular rate and rhythm, no murmurs rubs or gallops  .Abdomen: Soft, non-distended., overweightPsych: Alert and oriented x3, normal affect and mood.Neuro: Cranial nerves 2-12 are grossly intact Ext:no joint laxity noted in bilateral arms, no joint laxity noted in bilateral legs, joints look quite good, there is some mild bony hypertrophy of the second and third PIP joint left hand, but no swan-neck or boutonniere deformities, no sausage digits, shoulders full range of motion without limitations, elbows without flexion contractures, toes without crossover toes and without splay toes, gait without antalgia and without foot drop    Lab Results   Component Value  Date/Time    SODIUM 140 05/03/2023 09:23 AM    POTASSIUM 4.0 05/03/2023 09:23 AM    CHLORIDE 103 05/03/2023 09:23 AM    CO2 24 05/03/2023 09:23 AM    GLUCOSE 87 05/03/2023 09:23 AM    BUN 15 05/03/2023 09:23 AM    CREATININE 0.70 05/03/2023 09:23 AM    CREATININE 0.82 11/27/2012 12:00 AM    BUNCREATRAT 24 (H) 11/27/2012 12:00 AM      Lab Results   Component Value Date/Time    WBC 6.4 05/03/2023 09:23 AM    RBC 4.68 05/03/2023 09:23 AM    HEMOGLOBIN 13.2 05/03/2023 09:23 AM    HEMATOCRIT 42.0 05/03/2023 09:23 AM    MCV 89.7 05/03/2023 09:23 AM    MCH 28.2 05/03/2023 09:23 AM    MCHC 31.4 (L) 05/03/2023 09:23 AM    MPV 10.9 05/03/2023 09:23 AM    NEUTSPOLYS 59.20 05/03/2023 09:23 AM    LYMPHOCYTES 27.40 05/03/2023 09:23 AM    MONOCYTES 10.00 05/03/2023 09:23 AM    EOSINOPHILS 2.20 05/03/2023 09:23 AM    BASOPHILS 0.90 05/03/2023 09:23 AM      Lab Results   Component Value Date/Time    CALCIUM 9.3 05/03/2023 09:23 AM    ASTSGOT 18 05/03/2023 09:23 AM    ALTSGPT 20 05/03/2023 09:23 AM    ALKPHOSPHAT 61 05/03/2023 09:23 AM    TBILIRUBIN 0.3 05/03/2023 09:23 AM    ALBUMIN 4.3 05/03/2023 09:23 AM    TOTPROTEIN 7.4 05/03/2023 09:23 AM     Lab Results   Component Value Date/Time    URICACID 4.9 05/03/2023 09:23 AM    ANTINUCAB None Detected 05/03/2023 09:23 AM     Lab Results   Component Value Date/Time    SEDRATEWES 18 04/24/2023 04:00 PM     Lab Results   Component Value Date/Time    HBA1C 5.8 (H) 03/02/2022 08:09 AM     Lab Results   Component Value Date/Time    G6PD 15.8 04/24/2023 04:00 PM     Assessment and Plan:     1. Rheumatoid arthritis involving multiple sites with positive rheumatoid factor (HCC)  Patient feels that she may not have rheumatoid arthritis especially with the resolution of symptoms in her hands and feet with the adjustment of her amlodipine medication.  Today we will recheck rheumatoid factor and CCP, discussed with patient about possibly decreasing Plaquenil from daily to every other day or  possibly every third day to see if any symptoms arise, patient is interested and will be doing so.  - hydroxychloroquine (PLAQUENIL) 200 MG Tab; 1 tab po qday  Dispense: 90 Tablet; Refill: 1  - Comp Metabolic Panel; Future  - CBC WITH DIFFERENTIAL; Future  - Sed Rate; Future    2. Long-term use of Plaquenil  Currently on Plaquenil at 200 mg p.o. daily, patient is interested in trying to decrease the dose of Plaquenil we will decrease Plaquenil to 200 mg p.o. every other day to every third day, patient to evaluate for any symptoms.  Of note G6PD levels are adequate, patient does need monitoring labs every 6 months, patient due for labs November 2023, labs ordered for patient patient also needs eye exam every year, next eye exam due about summer 2024.  - hydroxychloroquine (PLAQUENIL) 200 MG Tab; 1 tab po qday  Dispense: 90 Tablet; Refill: 1  - Comp Metabolic Panel; Future  - CBC WITH DIFFERENTIAL; Future  - Sed Rate; Future    3. NSAID long-term use  Patient uses NSAIDs as needed, patient needs monitoring labs every 6 months, next labs due about November 2023, labs ordered for patient  - Comp Metabolic Panel; Future  - CBC WITH DIFFERENTIAL; Future  - Sed Rate; Future    Followup: Return in about 7 months (around 5/26/2024). or sooner aida Lopez  was seen 30 minutes face-to-face of which more than 50% of the time was spent counseling the patient (excluding time for procedures)  regarding  rheumatological condition and care. Therapy was discussed in detail.      Please note that this dictation was created using voice recognition software. I have made every reasonable attempt to correct obvious errors, but I expect that there are errors of grammar and possibly content that I did not discover before finalizing the note.

## 2023-11-14 ENCOUNTER — HOSPITAL ENCOUNTER (OUTPATIENT)
Dept: LAB | Facility: MEDICAL CENTER | Age: 68
End: 2023-11-14
Attending: INTERNAL MEDICINE
Payer: MEDICARE

## 2023-11-14 DIAGNOSIS — M05.79 RHEUMATOID ARTHRITIS INVOLVING MULTIPLE SITES WITH POSITIVE RHEUMATOID FACTOR (HCC): ICD-10-CM

## 2023-11-14 DIAGNOSIS — Z79.1 NSAID LONG-TERM USE: ICD-10-CM

## 2023-11-14 DIAGNOSIS — Z79.899 LONG-TERM USE OF PLAQUENIL: ICD-10-CM

## 2023-11-14 LAB
ALBUMIN SERPL BCP-MCNC: 4.1 G/DL (ref 3.2–4.9)
ALBUMIN/GLOB SERPL: 1.5 G/DL
ALP SERPL-CCNC: 64 U/L (ref 30–99)
ALT SERPL-CCNC: 17 U/L (ref 2–50)
ANION GAP SERPL CALC-SCNC: 10 MMOL/L (ref 7–16)
AST SERPL-CCNC: 22 U/L (ref 12–45)
BASOPHILS # BLD AUTO: 0.8 % (ref 0–1.8)
BASOPHILS # BLD: 0.05 K/UL (ref 0–0.12)
BILIRUB SERPL-MCNC: 0.3 MG/DL (ref 0.1–1.5)
BUN SERPL-MCNC: 13 MG/DL (ref 8–22)
CALCIUM ALBUM COR SERPL-MCNC: 8.8 MG/DL (ref 8.5–10.5)
CALCIUM SERPL-MCNC: 8.9 MG/DL (ref 8.5–10.5)
CHLORIDE SERPL-SCNC: 99 MMOL/L (ref 96–112)
CO2 SERPL-SCNC: 27 MMOL/L (ref 20–33)
CREAT SERPL-MCNC: 0.74 MG/DL (ref 0.5–1.4)
EOSINOPHIL # BLD AUTO: 0.13 K/UL (ref 0–0.51)
EOSINOPHIL NFR BLD: 2.2 % (ref 0–6.9)
ERYTHROCYTE [DISTWIDTH] IN BLOOD BY AUTOMATED COUNT: 46.8 FL (ref 35.9–50)
ERYTHROCYTE [SEDIMENTATION RATE] IN BLOOD BY WESTERGREN METHOD: 10 MM/HOUR (ref 0–25)
GFR SERPLBLD CREATININE-BSD FMLA CKD-EPI: 87 ML/MIN/1.73 M 2
GLOBULIN SER CALC-MCNC: 2.8 G/DL (ref 1.9–3.5)
GLUCOSE SERPL-MCNC: 116 MG/DL (ref 65–99)
HCT VFR BLD AUTO: 42.5 % (ref 37–47)
HGB BLD-MCNC: 14.1 G/DL (ref 12–16)
IMM GRANULOCYTES # BLD AUTO: 0.01 K/UL (ref 0–0.11)
IMM GRANULOCYTES NFR BLD AUTO: 0.2 % (ref 0–0.9)
LYMPHOCYTES # BLD AUTO: 1.67 K/UL (ref 1–4.8)
LYMPHOCYTES NFR BLD: 28.1 % (ref 22–41)
MCH RBC QN AUTO: 29.7 PG (ref 27–33)
MCHC RBC AUTO-ENTMCNC: 33.2 G/DL (ref 32.2–35.5)
MCV RBC AUTO: 89.5 FL (ref 81.4–97.8)
MONOCYTES # BLD AUTO: 0.47 K/UL (ref 0–0.85)
MONOCYTES NFR BLD AUTO: 7.9 % (ref 0–13.4)
NEUTROPHILS # BLD AUTO: 3.61 K/UL (ref 1.82–7.42)
NEUTROPHILS NFR BLD: 60.8 % (ref 44–72)
NRBC # BLD AUTO: 0 K/UL
NRBC BLD-RTO: 0 /100 WBC (ref 0–0.2)
PLATELET # BLD AUTO: 279 K/UL (ref 164–446)
PMV BLD AUTO: 12 FL (ref 9–12.9)
POTASSIUM SERPL-SCNC: 3.7 MMOL/L (ref 3.6–5.5)
PROT SERPL-MCNC: 6.9 G/DL (ref 6–8.2)
RBC # BLD AUTO: 4.75 M/UL (ref 4.2–5.4)
RHEUMATOID FACT SER IA-ACNC: 11 IU/ML (ref 0–14)
SODIUM SERPL-SCNC: 136 MMOL/L (ref 135–145)
WBC # BLD AUTO: 5.9 K/UL (ref 4.8–10.8)

## 2023-11-14 PROCEDURE — 36415 COLL VENOUS BLD VENIPUNCTURE: CPT

## 2023-11-14 PROCEDURE — 86200 CCP ANTIBODY: CPT

## 2023-11-14 PROCEDURE — 85025 COMPLETE CBC W/AUTO DIFF WBC: CPT

## 2023-11-14 PROCEDURE — 85652 RBC SED RATE AUTOMATED: CPT

## 2023-11-14 PROCEDURE — 86431 RHEUMATOID FACTOR QUANT: CPT

## 2023-11-14 PROCEDURE — 80053 COMPREHEN METABOLIC PANEL: CPT

## 2023-11-16 LAB — CCP IGA+IGG SERPL IA-ACNC: 6 UNITS (ref 0–19)

## 2024-05-20 ENCOUNTER — HOSPITAL ENCOUNTER (OUTPATIENT)
Dept: LAB | Facility: MEDICAL CENTER | Age: 69
End: 2024-05-20
Attending: OPHTHALMOLOGY
Payer: MEDICARE

## 2024-05-21 LAB
ANION GAP SERPL CALC-SCNC: 13 MMOL/L (ref 7–16)
BUN SERPL-MCNC: 16 MG/DL (ref 8–22)
CALCIUM SERPL-MCNC: 8.7 MG/DL (ref 8.5–10.5)
CHLORIDE SERPL-SCNC: 106 MMOL/L (ref 96–112)
CO2 SERPL-SCNC: 23 MMOL/L (ref 20–33)
CREAT SERPL-MCNC: 0.65 MG/DL (ref 0.5–1.4)
GFR SERPLBLD CREATININE-BSD FMLA CKD-EPI: 95 ML/MIN/1.73 M 2
GLUCOSE SERPL-MCNC: 141 MG/DL (ref 65–99)
POTASSIUM SERPL-SCNC: 3.9 MMOL/L (ref 3.6–5.5)
SODIUM SERPL-SCNC: 142 MMOL/L (ref 135–145)

## 2024-11-02 SDOH — HEALTH STABILITY: PHYSICAL HEALTH: ON AVERAGE, HOW MANY DAYS PER WEEK DO YOU ENGAGE IN MODERATE TO STRENUOUS EXERCISE (LIKE A BRISK WALK)?: 2 DAYS

## 2024-11-02 SDOH — ECONOMIC STABILITY: TRANSPORTATION INSECURITY
IN THE PAST 12 MONTHS, HAS THE LACK OF TRANSPORTATION KEPT YOU FROM MEDICAL APPOINTMENTS OR FROM GETTING MEDICATIONS?: NO

## 2024-11-02 SDOH — ECONOMIC STABILITY: INCOME INSECURITY: IN THE LAST 12 MONTHS, WAS THERE A TIME WHEN YOU WERE NOT ABLE TO PAY THE MORTGAGE OR RENT ON TIME?: NO

## 2024-11-02 SDOH — ECONOMIC STABILITY: FOOD INSECURITY: WITHIN THE PAST 12 MONTHS, YOU WORRIED THAT YOUR FOOD WOULD RUN OUT BEFORE YOU GOT MONEY TO BUY MORE.: NEVER TRUE

## 2024-11-02 SDOH — ECONOMIC STABILITY: FOOD INSECURITY: WITHIN THE PAST 12 MONTHS, THE FOOD YOU BOUGHT JUST DIDN'T LAST AND YOU DIDN'T HAVE MONEY TO GET MORE.: NEVER TRUE

## 2024-11-02 SDOH — HEALTH STABILITY: PHYSICAL HEALTH: ON AVERAGE, HOW MANY MINUTES DO YOU ENGAGE IN EXERCISE AT THIS LEVEL?: 60 MIN

## 2024-11-02 SDOH — HEALTH STABILITY: MENTAL HEALTH
STRESS IS WHEN SOMEONE FEELS TENSE, NERVOUS, ANXIOUS, OR CAN'T SLEEP AT NIGHT BECAUSE THEIR MIND IS TROUBLED. HOW STRESSED ARE YOU?: NOT AT ALL

## 2024-11-02 SDOH — ECONOMIC STABILITY: TRANSPORTATION INSECURITY
IN THE PAST 12 MONTHS, HAS LACK OF RELIABLE TRANSPORTATION KEPT YOU FROM MEDICAL APPOINTMENTS, MEETINGS, WORK OR FROM GETTING THINGS NEEDED FOR DAILY LIVING?: NO

## 2024-11-02 SDOH — ECONOMIC STABILITY: TRANSPORTATION INSECURITY
IN THE PAST 12 MONTHS, HAS LACK OF TRANSPORTATION KEPT YOU FROM MEETINGS, WORK, OR FROM GETTING THINGS NEEDED FOR DAILY LIVING?: NO

## 2024-11-02 SDOH — ECONOMIC STABILITY: HOUSING INSECURITY
IN THE LAST 12 MONTHS, WAS THERE A TIME WHEN YOU DID NOT HAVE A STEADY PLACE TO SLEEP OR SLEPT IN A SHELTER (INCLUDING NOW)?: NO

## 2024-11-02 SDOH — ECONOMIC STABILITY: INCOME INSECURITY: HOW HARD IS IT FOR YOU TO PAY FOR THE VERY BASICS LIKE FOOD, HOUSING, MEDICAL CARE, AND HEATING?: NOT HARD AT ALL

## 2024-11-02 ASSESSMENT — SOCIAL DETERMINANTS OF HEALTH (SDOH)
DO YOU BELONG TO ANY CLUBS OR ORGANIZATIONS SUCH AS CHURCH GROUPS UNIONS, FRATERNAL OR ATHLETIC GROUPS, OR SCHOOL GROUPS?: YES
IN A TYPICAL WEEK, HOW MANY TIMES DO YOU TALK ON THE PHONE WITH FAMILY, FRIENDS, OR NEIGHBORS?: ONCE A WEEK
HOW OFTEN DO YOU HAVE A DRINK CONTAINING ALCOHOL: 2-3 TIMES A WEEK
HOW OFTEN DO YOU ATTEND CHURCH OR RELIGIOUS SERVICES?: PATIENT DECLINED
HOW OFTEN DO YOU GET TOGETHER WITH FRIENDS OR RELATIVES?: MORE THAN THREE TIMES A WEEK
IN A TYPICAL WEEK, HOW MANY TIMES DO YOU TALK ON THE PHONE WITH FAMILY, FRIENDS, OR NEIGHBORS?: ONCE A WEEK
WITHIN THE PAST 12 MONTHS, YOU WORRIED THAT YOUR FOOD WOULD RUN OUT BEFORE YOU GOT THE MONEY TO BUY MORE: NEVER TRUE
HOW OFTEN DO YOU HAVE SIX OR MORE DRINKS ON ONE OCCASION: NEVER
DO YOU BELONG TO ANY CLUBS OR ORGANIZATIONS SUCH AS CHURCH GROUPS UNIONS, FRATERNAL OR ATHLETIC GROUPS, OR SCHOOL GROUPS?: YES
IN THE PAST 12 MONTHS, HAS THE ELECTRIC, GAS, OIL, OR WATER COMPANY THREATENED TO SHUT OFF SERVICE IN YOUR HOME?: NO
HOW OFTEN DO YOU ATTEND CHURCH OR RELIGIOUS SERVICES?: PATIENT DECLINED
HOW OFTEN DO YOU ATTENT MEETINGS OF THE CLUB OR ORGANIZATION YOU BELONG TO?: MORE THAN 4 TIMES PER YEAR
HOW MANY DRINKS CONTAINING ALCOHOL DO YOU HAVE ON A TYPICAL DAY WHEN YOU ARE DRINKING: 1 OR 2
HOW HARD IS IT FOR YOU TO PAY FOR THE VERY BASICS LIKE FOOD, HOUSING, MEDICAL CARE, AND HEATING?: NOT HARD AT ALL
HOW OFTEN DO YOU ATTENT MEETINGS OF THE CLUB OR ORGANIZATION YOU BELONG TO?: MORE THAN 4 TIMES PER YEAR
HOW OFTEN DO YOU GET TOGETHER WITH FRIENDS OR RELATIVES?: MORE THAN THREE TIMES A WEEK

## 2024-11-02 ASSESSMENT — LIFESTYLE VARIABLES
HOW OFTEN DO YOU HAVE A DRINK CONTAINING ALCOHOL: 2-3 TIMES A WEEK
HOW OFTEN DO YOU HAVE SIX OR MORE DRINKS ON ONE OCCASION: NEVER
HOW MANY STANDARD DRINKS CONTAINING ALCOHOL DO YOU HAVE ON A TYPICAL DAY: 1 OR 2
SKIP TO QUESTIONS 9-10: 1
AUDIT-C TOTAL SCORE: 3

## 2024-11-04 ENCOUNTER — APPOINTMENT (OUTPATIENT)
Dept: MEDICAL GROUP | Facility: PHYSICIAN GROUP | Age: 69
End: 2024-11-04
Payer: MEDICARE

## 2024-11-04 VITALS
HEIGHT: 64 IN | OXYGEN SATURATION: 96 % | HEART RATE: 78 BPM | RESPIRATION RATE: 16 BRPM | WEIGHT: 241 LBS | DIASTOLIC BLOOD PRESSURE: 86 MMHG | SYSTOLIC BLOOD PRESSURE: 138 MMHG | BODY MASS INDEX: 41.15 KG/M2 | TEMPERATURE: 96.4 F

## 2024-11-04 DIAGNOSIS — Z78.0 POSTMENOPAUSAL: ICD-10-CM

## 2024-11-04 DIAGNOSIS — R73.03 PREDIABETES: Chronic | ICD-10-CM

## 2024-11-04 DIAGNOSIS — Z13.29 SCREENING FOR THYROID DISORDER: ICD-10-CM

## 2024-11-04 DIAGNOSIS — I65.23 CAROTID ARTERY PLAQUE, BILATERAL: ICD-10-CM

## 2024-11-04 DIAGNOSIS — Z13.1 SCREENING FOR DIABETES MELLITUS: ICD-10-CM

## 2024-11-04 DIAGNOSIS — I10 ESSENTIAL HYPERTENSION: ICD-10-CM

## 2024-11-04 DIAGNOSIS — E78.00 PURE HYPERCHOLESTEROLEMIA: Chronic | ICD-10-CM

## 2024-11-04 DIAGNOSIS — Z13.220 SCREENING FOR HYPERLIPIDEMIA: ICD-10-CM

## 2024-11-04 DIAGNOSIS — M43.16 SPONDYLOLISTHESIS OF LUMBAR REGION: ICD-10-CM

## 2024-11-04 DIAGNOSIS — Z11.59 NEED FOR HEPATITIS C SCREENING TEST: ICD-10-CM

## 2024-11-04 PROBLEM — N95.1 VAGINAL DRYNESS, MENOPAUSAL: Chronic | Status: ACTIVE | Noted: 2022-02-17

## 2024-11-04 PROBLEM — E66.01 MORBID OBESITY (HCC): Chronic | Status: ACTIVE | Noted: 2022-03-09

## 2024-11-04 PROBLEM — R03.0 ELEVATED BLOOD PRESSURE READING WITHOUT DIAGNOSIS OF HYPERTENSION: Status: ACTIVE | Noted: 2022-03-07

## 2024-11-04 PROBLEM — R51.9 FREQUENT HEADACHES: Status: ACTIVE | Noted: 2022-02-17

## 2024-11-04 PROBLEM — R41.3 MEMORY CHANGES: Status: ACTIVE | Noted: 2022-02-17

## 2024-11-04 PROCEDURE — 3079F DIAST BP 80-89 MM HG: CPT | Performed by: FAMILY MEDICINE

## 2024-11-04 PROCEDURE — 99204 OFFICE O/P NEW MOD 45 MIN: CPT | Performed by: FAMILY MEDICINE

## 2024-11-04 PROCEDURE — 3075F SYST BP GE 130 - 139MM HG: CPT | Performed by: FAMILY MEDICINE

## 2024-11-04 RX ORDER — FOLIC ACID/MULTIVIT,IRON,MINER 0.4MG-18MG
TABLET ORAL
COMMUNITY

## 2024-11-04 ASSESSMENT — PATIENT HEALTH QUESTIONNAIRE - PHQ9: CLINICAL INTERPRETATION OF PHQ2 SCORE: 0

## 2024-11-04 ASSESSMENT — FIBROSIS 4 INDEX: FIB4 SCORE: 1.34

## 2024-11-04 NOTE — LETTER
[facility]  Larisa Eason M.D.  [address]  [place] [address]  Fax: [phone]   Authorization for Release/Disclosure of   Protected Health Information   Name: AURELIO NINA : 1955 SSN: [id number]   Address: [address] [place] [address] [place] [address] Phone:    [phone] (home) [phone] (work)   I authorize the entity listed below to release/disclose the PHI below to:   [facility]/Larisa Eason M.D. and Larisa Eason M.D.   Provider or Entity Name:     Address   City, State, Zip   Phone:      Fax:     Reason for request: continuity of care   Information to be released:    [  ] LAST COLONOSCOPY,  including any PATH REPORT and follow-up  [  ] LAST FIT/COLOGUARD RESULT [  ] LAST DEXA  [  ] LAST MAMMOGRAM  [  ] LAST PAP  [  ] LAST LABS [  ] RETINA EXAM REPORT  [  ] IMMUNIZATION RECORDS  [  ] Release all info      [  ] Check here and initial the line next to each item to release ALL health information INCLUDING  _____ Care and treatment for drug and / or alcohol abuse  _____ HIV testing, infection status, or AIDS  _____ Genetic Testing    DATES OF SERVICE OR TIME PERIOD TO BE DISCLOSED: _____________  I understand and acknowledge that:  * This Authorization may be revoked at any time by you in writing, except if your health information has already been used or disclosed.  * Your health information that will be used or disclosed as a result of you signing this authorization could be re-disclosed by the recipient. If this occurs, your re-disclosed health information may no longer be protected by State or Federal laws.  * You may refuse to sign this Authorization. Your refusal will not affect your ability to obtain treatment.  * This Authorization becomes effective upon signing and will  on (date) __________.      If no date is indicated, this Authorization will  one (1) year from the signature date.    Name: Aurelio Nina  Signature: Date:   2024      PLEASE FAX REQUESTED RECORDS BACK TO: [phone]

## 2024-11-04 NOTE — LETTER
[facility]  Larisa Eason M.D.  [address]  [place] [address]  Fax: [phone]   Authorization for Release/Disclosure of   Protected Health Information   Name: AURELIO NINA : 1955 SSN: [id number]   Address: [address] [place] [address] [address] [place] [address] Phone:    [phone] (home) [phone] (work)   I authorize the entity listed below to release/disclose the PHI below to:   [facility]/Larisa Eason M.D. and Larisa Eason M.D.   Provider or Entity Name:     Address   City, State, Zip   Phone:      Fax:     Reason for request: continuity of care   Information to be released:    [  ] LAST COLONOSCOPY,  including any PATH REPORT and follow-up  [  ] LAST FIT/COLOGUARD RESULT [  ] LAST DEXA  [  ] LAST MAMMOGRAM  [  ] LAST PAP  [  ] LAST LABS [  ] RETINA EXAM REPORT  [  ] IMMUNIZATION RECORDS  [  ] Release all info      [  ] Check here and initial the line next to each item to release ALL health information INCLUDING  _____ Care and treatment for drug and / or alcohol abuse  _____ HIV testing, infection status, or AIDS  _____ Genetic Testing    DATES OF SERVICE OR TIME PERIOD TO BE DISCLOSED: _____________  I understand and acknowledge that:  * This Authorization may be revoked at any time by you in writing, except if your health information has already been used or disclosed.  * Your health information that will be used or disclosed as a result of you signing this authorization could be re-disclosed by the recipient. If this occurs, your re-disclosed health information may no longer be protected by State or Federal laws.  * You may refuse to sign this Authorization. Your refusal will not affect your ability to obtain treatment.  * This Authorization becomes effective upon signing and will  on (date) __________.      If no date is indicated, this Authorization will  one (1) year from the signature date.    Name: Aurelio Nina  Signature: Date:   2024      PLEASE FAX REQUESTED RECORDS BACK TO: [phone]

## 2024-11-04 NOTE — LETTER
[facility]  Larisa Eason M.D.  [address]  [place] [address]  Fax: [phone]   Authorization for Release/Disclosure of   Protected Health Information   Name: AURELIO NINA : 1955 SSN: [id number]   Address: [address] [place] [address] [place] [address] Phone:    [phone] (home) [phone] (work)   I authorize the entity listed below to release/disclose the PHI below to:   [facility]/Larisa Eason M.D. and Larisa Eason M.D.   Provider or Entity Name:     Address   City, State, Zip   Phone:      Fax:     Reason for request: continuity of care   Information to be released:    [  ] LAST COLONOSCOPY,  including any PATH REPORT and follow-up  [  ] LAST FIT/COLOGUARD RESULT [  ] LAST DEXA  [  ] LAST MAMMOGRAM  [  ] LAST PAP  [  ] LAST LABS [  ] RETINA EXAM REPORT  [  ] IMMUNIZATION RECORDS  [  ] Release all info      [  ] Check here and initial the line next to each item to release ALL health information INCLUDING  _____ Care and treatment for drug and / or alcohol abuse  _____ HIV testing, infection status, or AIDS  _____ Genetic Testing    DATES OF SERVICE OR TIME PERIOD TO BE DISCLOSED: _____________  I understand and acknowledge that:  * This Authorization may be revoked at any time by you in writing, except if your health information has already been used or disclosed.  * Your health information that will be used or disclosed as a result of you signing this authorization could be re-disclosed by the recipient. If this occurs, your re-disclosed health information may no longer be protected by State or Federal laws.  * You may refuse to sign this Authorization. Your refusal will not affect your ability to obtain treatment.  * This Authorization becomes effective upon signing and will  on (date) __________.      If no date is indicated, this Authorization will  one (1) year from the signature date.    Name: Aurelio Nina  Signature: Date:   2024      PLEASE FAX REQUESTED RECORDS BACK TO: [phone]

## 2024-11-25 ENCOUNTER — HOSPITAL ENCOUNTER (OUTPATIENT)
Dept: RADIOLOGY | Facility: MEDICAL CENTER | Age: 69
End: 2024-11-25
Attending: FAMILY MEDICINE
Payer: MEDICARE

## 2024-11-25 DIAGNOSIS — I65.23 CAROTID ARTERY PLAQUE, BILATERAL: ICD-10-CM

## 2024-11-25 PROCEDURE — 93880 EXTRACRANIAL BILAT STUDY: CPT

## 2024-12-02 ENCOUNTER — HOSPITAL ENCOUNTER (OUTPATIENT)
Dept: RADIOLOGY | Facility: MEDICAL CENTER | Age: 69
End: 2024-12-02
Attending: FAMILY MEDICINE
Payer: MEDICARE

## 2024-12-02 DIAGNOSIS — Z78.0 POSTMENOPAUSAL: ICD-10-CM

## 2024-12-02 PROCEDURE — 77080 DXA BONE DENSITY AXIAL: CPT

## 2024-12-07 ENCOUNTER — HOSPITAL ENCOUNTER (OUTPATIENT)
Dept: LAB | Facility: MEDICAL CENTER | Age: 69
End: 2024-12-07
Attending: FAMILY MEDICINE
Payer: MEDICARE

## 2024-12-07 DIAGNOSIS — Z13.220 SCREENING FOR HYPERLIPIDEMIA: ICD-10-CM

## 2024-12-07 DIAGNOSIS — E78.00 PURE HYPERCHOLESTEROLEMIA: Chronic | ICD-10-CM

## 2024-12-07 DIAGNOSIS — Z13.29 SCREENING FOR THYROID DISORDER: ICD-10-CM

## 2024-12-07 DIAGNOSIS — Z13.1 SCREENING FOR DIABETES MELLITUS: ICD-10-CM

## 2024-12-07 DIAGNOSIS — Z11.59 NEED FOR HEPATITIS C SCREENING TEST: ICD-10-CM

## 2024-12-07 LAB
CHOLEST SERPL-MCNC: 196 MG/DL (ref 100–199)
HCV AB SER QL: NORMAL
HDLC SERPL-MCNC: 54 MG/DL
LDLC SERPL CALC-MCNC: 123 MG/DL
TRIGL SERPL-MCNC: 96 MG/DL (ref 0–149)
TSH SERPL DL<=0.005 MIU/L-ACNC: 1.88 UIU/ML (ref 0.38–5.33)

## 2024-12-07 PROCEDURE — 86803 HEPATITIS C AB TEST: CPT

## 2024-12-07 PROCEDURE — 80061 LIPID PANEL: CPT

## 2024-12-07 PROCEDURE — 36415 COLL VENOUS BLD VENIPUNCTURE: CPT

## 2024-12-07 PROCEDURE — 83036 HEMOGLOBIN GLYCOSYLATED A1C: CPT | Mod: GA

## 2024-12-07 PROCEDURE — 84443 ASSAY THYROID STIM HORMONE: CPT

## 2024-12-08 LAB
EST. AVERAGE GLUCOSE BLD GHB EST-MCNC: 123 MG/DL
HBA1C MFR BLD: 5.9 % (ref 4–5.6)

## 2024-12-11 ENCOUNTER — OFFICE VISIT (OUTPATIENT)
Dept: MEDICAL GROUP | Facility: PHYSICIAN GROUP | Age: 69
End: 2024-12-11
Payer: MEDICARE

## 2024-12-11 VITALS
BODY MASS INDEX: 40.97 KG/M2 | WEIGHT: 240 LBS | DIASTOLIC BLOOD PRESSURE: 80 MMHG | HEART RATE: 85 BPM | HEIGHT: 64 IN | SYSTOLIC BLOOD PRESSURE: 130 MMHG | OXYGEN SATURATION: 95 % | TEMPERATURE: 98.5 F

## 2024-12-11 DIAGNOSIS — R91.1 LUNG NODULE: ICD-10-CM

## 2024-12-11 DIAGNOSIS — E78.00 PURE HYPERCHOLESTEROLEMIA: ICD-10-CM

## 2024-12-11 PROCEDURE — 3079F DIAST BP 80-89 MM HG: CPT | Performed by: FAMILY MEDICINE

## 2024-12-11 PROCEDURE — 3075F SYST BP GE 130 - 139MM HG: CPT | Performed by: FAMILY MEDICINE

## 2024-12-11 PROCEDURE — 99214 OFFICE O/P EST MOD 30 MIN: CPT | Performed by: FAMILY MEDICINE

## 2024-12-11 RX ORDER — CELECOXIB 100 MG/1
100 CAPSULE ORAL DAILY
Qty: 90 CAPSULE | Refills: 0 | Status: SHIPPED | OUTPATIENT
Start: 2024-12-11

## 2024-12-11 ASSESSMENT — FIBROSIS 4 INDEX: FIB4 SCORE: 1.34

## 2024-12-11 NOTE — PROGRESS NOTES
Verbal consent was acquired by the patient to use [facility] ambient listening note generation during this visit.    Subjective:     HPI:   History of Present Illness  The patient is a 69-year-old female presenting as an established patient for evaluation of travel fitness, hyperlipidemia, a pulmonary nodule, and weight management.    The patient has a history of right hip arthroplasty and is planning a month-long trip to [place] and [place], necessitating a refill of her Celecoxib prescription and a travel fitness certification for insurance purposes.    She reports experiencing sharp foot pain primarily in the mornings. Despite a normal physical examination of the foot, she was referred to an orthopedic surgeon due to a suspected spinal etiology. The patient expresses reluctance to undergo spinal surgery and has been referred to pain management. She is uncertain whether her pain is osseous, discogenic, or due to muscle spasms. She has a history of scoliosis, with progression from 10 to 17 degrees over the past few years. She has previously received two epidural steroid injections; the first provided significant and prolonged relief, while the second was less effective. She reports numbness and paresthesia in her foot but denies any radicular symptoms.    The patient has a long-standing history of weight management challenges and is considering pharmacotherapy with Semaglutide (Ozempic) or another GLP-1. However, she expresses concerns regarding the potential adverse effects of these medications.    She has been diagnosed with a pulmonary nodule and is seeking further evaluation. The patient has a history of tobacco use.    The patient has hyperlipidemia and is currently not on statin therapy due to a documented allergy. Her LDL cholesterol levels are elevated, and she has been attempting dietary modifications, including increased fish consumption.    Supplemental Information  The patient has a diagnosis of  "eczema and has been trialing various treatments. She received a sample of a medication from her dermatologist, which was effective. However, the prescribed medication costs $1900 at [facility]. There is a specialized dermatology pharmacy in [place] that can order the medication, but they do not offer shipping due to the high cost. Fortunately, her dermatologist provides her with samples. The eczema is localized to a small area and is not widespread.    SOCIAL HISTORY  She does not smoke.    ALLERGIES  The patient is allergic to STATINS.    MEDICATIONS  [employer]        Health Maintenance: Completed    Objective:     Exam:  /80 (BP Location: Left arm, Patient Position: Sitting, BP Cuff Size: Large adult)   Pulse 85   Temp 36.9 °C (98.5 °F) (Temporal)   Ht 1.626 m (5' 4\")   Wt 109 kg (240 lb)   SpO2 95%   BMI 41.20 kg/m²  Body mass index is 41.2 kg/m².    Physical Exam  Vitals reviewed.   Constitutional:       Appearance: Normal appearance.   HENT:      Head: Normocephalic and atraumatic.      Right Ear: External ear normal.      Left Ear: External ear normal.      Nose: Nose normal.   Pulmonary:      Effort: Pulmonary effort is normal. No respiratory distress.   Abdominal:      General: Abdomen is flat.      Palpations: Abdomen is soft.   Skin:     General: Skin is warm and dry.   Neurological:      Mental Status: She is alert and oriented to person, place, and time.   Psychiatric:         Mood and Affect: Mood normal.         Behavior: Behavior normal.             Results  Laboratory Studies  LDL is mildly high.    Imaging  Bone density scan showed a little bit of osteopenia, but no osteoporosis. Spine looked good. CT scan without contrast of the chest showed pulmonary nodules in the left lobe measuring up to 4 mm.    Assessment & Plan:     1. Pure hypercholesterolemia  CT-CARDIAC SCORING      2. Lung nodule  CT-CHEST (THORAX) W/O          Assessment & Plan  1. Travel fitness.  She is deemed fit for her " upcoming travel plans. A note confirming her fitness for travel will be provided.    2. Hyperlipidemia.  Her LDL levels are slightly elevated. She is not currently on a statin due to an allergy. Her coronary artery calcium score was previously zero, indicating no clots. However, this score can sometimes be overestimated. Her risk factors, including age and blood pressure, suggest a high enough risk to warrant medication. A repeat coronary artery calcium score will be ordered to confirm the previous result. If the score remains zero, statin therapy will not be necessary. Alternatively, a referral to a lipid specialist can be made for consideration of Zetia or other cholesterol medications.    3. Lung nodule.  She has a solitary pulmonary nodule in the left lobe measuring up to 4 mm, as identified on a CT scan without contrast dated 11/14/2023. A follow-up CT scan of the chest will be ordered to monitor any changes in the nodule.    4. Foot pain.  She experiences sharp pain in her foot, which may be related to her back. She has a history of scoliosis, which has increased from 10% to 17%. She has had two epidurals in the past, with varying degrees of success. An epidural injection is recommended for both diagnostic and therapeutic purposes. It can help determine if the pain is originating from the spine and provide relief. Surgery is not recommended at this time.    5. Weight management.  She has been struggling with weight management throughout her life and is considering the use of Ozempic or Victoza. However, she is concerned about the potential side effects of these medications. Discussed with her that she can certainly try one of those, but it is going to be out of pocket.    PROCEDURE  The patient has a history of right hip replacement.      Return in about 3 months (around 3/11/2025).    Please note that this dictation was created using voice recognition software. I have made every reasonable attempt to correct  obvious errors, but I expect that there are errors of grammar and possibly content that I did not discover before finalizing the note.    Larisa Eason MD  Family Medicine and Non - Operative Sports Medicine   [facility]

## 2024-12-11 NOTE — LETTER
December 11, 2024        Jodi Jonesrington      [place] is safe to travel via cruise line as planned.    Thank you   Dr. Jenaro Eason M.D.

## 2024-12-11 NOTE — LETTER
December 11, 2024        Jodi Jonesrington    Patient is safe to travel as planned. Last visit was 11/4/2024.                                 Larisa Eason M.D.

## 2024-12-13 ENCOUNTER — HOSPITAL ENCOUNTER (OUTPATIENT)
Dept: RADIOLOGY | Facility: MEDICAL CENTER | Age: 69
End: 2024-12-13
Attending: FAMILY MEDICINE
Payer: COMMERCIAL

## 2024-12-13 ENCOUNTER — HOSPITAL ENCOUNTER (OUTPATIENT)
Dept: RADIOLOGY | Facility: MEDICAL CENTER | Age: 69
End: 2024-12-13
Attending: FAMILY MEDICINE
Payer: MEDICARE

## 2024-12-13 DIAGNOSIS — E78.00 PURE HYPERCHOLESTEROLEMIA: ICD-10-CM

## 2024-12-13 DIAGNOSIS — R91.1 LUNG NODULE: ICD-10-CM

## 2024-12-13 PROCEDURE — 71250 CT THORAX DX C-: CPT

## 2024-12-13 PROCEDURE — 4410556 CT-CARDIAC SCORING (SELF PAY ONLY)

## 2024-12-19 ENCOUNTER — APPOINTMENT (OUTPATIENT)
Dept: MEDICAL GROUP | Facility: PHYSICIAN GROUP | Age: 69
End: 2024-12-19
Payer: MEDICARE

## 2025-02-04 ENCOUNTER — PHYSICAL THERAPY (OUTPATIENT)
Dept: PHYSICAL THERAPY | Facility: REHABILITATION | Age: 70
End: 2025-02-04
Attending: FAMILY MEDICINE
Payer: MEDICARE

## 2025-02-04 DIAGNOSIS — M43.16 SPONDYLOLISTHESIS OF LUMBAR REGION: ICD-10-CM

## 2025-02-04 PROCEDURE — 97163 PT EVAL HIGH COMPLEX 45 MIN: CPT

## 2025-02-04 PROCEDURE — 97110 THERAPEUTIC EXERCISES: CPT

## 2025-02-04 NOTE — OP THERAPY EVALUATION
Outpatient Physical Therapy  INITIAL EVALUATION    [facility]  [address], [address]  [place] [address]  Phone:  [phone]    Date of Evaluation: 02/04/2025    Patient: Jodi Lopez  YOB: 1955  MRN: [id number]     Referring Provider: Larisa Eason M.D.  [address]  [place] [address]   Referring Diagnosis Spondylolisthesis of lumbar region [M43.16]     Time Calculation  Start time: 0130  Stop time: 0220 Time Calculation (min): 50 minutes         Chief Complaint: No chief complaint on file.    Visit Diagnoses     ICD-10-CM   1. Spondylolisthesis of lumbar region  M43.16       Date of onset of impairment: 2/4/2024    Subjective:   History of Present Illness:     Mechanism of injury:  CMHx: Pt with chronic central LBP; evaluated by Dr Hare who told her she is a potential surgical candidate but that she is not interested in surgery at this time. She notes she has an epidural scheduled mid February. She notes her function has greatly reduced with her walking. She also leaves on a trip in about one month.     Pain Behavior  Sxs: 10//10 pain at worst, mild to moerate other times. P1 B back ache across both sides; P2 nauseated/achy pain up to middle     Aggs: bending over (P1), cleaning cabinets/chores/packing a suitcase (has to take breaks due to the pain), walking prolonged, sitting unsupported P1-2    Eas: chiropracter adjustments, prior injections, massage, pilates (was helpful but stopped due to insurance)    24 hour:  AM feels very stiff (feels slightly better after doing these exercises)    Irritability: mod    Yellow flags: explore more in future    Imaging: multi-level spondy (see imaging)    PMHx: R arcr (still notes issues here and crepitus), R wrist ORIF, R knee surgery hx (meniscus per pt)    Profession/Recreation: some pilates exercises (LTR, bridging, pelvic tilts every AM),     Goals: improve walking/strength for travel, improve pain  with chores    Nu-step, bands (doesn't like, okay with tubing with handles)                            Past Medical History:   Diagnosis Date    Anesthesia     slow to wake, takes several days to clear, fentanly slow wake up    Arrhythmia     Arthritis     Blood clotting disorder (HCC) 1981    leg    Cataract     bilateral IOL    Chronic back pain     Cold sore 8/1/2016    Heart burn     Listeria meningitis 1985    NEGATIVE HISTORY OF     Pain     wrist    Pain     right knee    Pins and needles sensation     top of head  relieved with  tums    Renal disorder 2016    large left kidney cyst    Snoring     TIA (transient ischemic attack) 10/2015    possible    TIA (transient ischemic attack) 2015    possible?      Past Surgical History:   Procedure Laterality Date    PB SHLDR ARTHROSCOP,SURG,W/ROTAT CUFF REPB Right 5/4/2023    Procedure: Right shoulder arthroscopic rotator cuff repair, subacromial decompression, distal clavicle, excision, labral debridement;  Surgeon: Devante Lemon M.D.;  Location: [facility];  Service: Orthopedics    PB KNEE SCOPE,DIAGNOSTIC Right 6/4/2020    Procedure: ARTHROSCOPY, KNEE;  Surgeon: Valentín Draper M.D.;  Location: [facility];  Service: Orthopedics    MENISCECTOMY, KNEE, MEDIAL Right 6/4/2020    Procedure: MENISCECTOMY, KNEE, MEDIAL,LATERAL;  Surgeon: Valentín Draper M.D.;  Location: [facility];  Service: Orthopedics    ORIF, WRIST Right 10/9/2019    Procedure: ORIF, WRIST- DISTAL RADIUS;  Surgeon: Kwabena Michelle M.D.;  Location: [facility];  Service: Orthopedics    LIGAMENT REPAIR Right 10/9/2019    Procedure: REPAIR, LIGAMENT- SCAPHOLUNATE;  Surgeon: Kwabena Michelle M.D.;  Location: [facility];  Service: Orthopedics    HEMORRHOIDECTOMY  8/5/2016    Procedure: HEMORRHOIDECTOMY SINGLE QUADRANT;  Surgeon: Alpesh Gregory M.D.;  Location: [facility];  Service:     OTHER  2015    varicose vein right  "leg    OTHER      cataracts bilateral    OTHER      breast biopsy    BREAST BIOPSY      TONSILLECTOMY      TUBAL LIGATION       Social History     Tobacco Use    Smoking status: Never     Passive exposure: Yes    Smokeless tobacco: Never   Substance Use Topics    Alcohol use: Yes     Comment: 1-2 per week     Family and Occupational History     Socioeconomic History    Marital status:      Spouse name: Not on file    Number of children: Not on file    Years of education: Not on file    Highest education level: Associate degree: occupational, technical, or vocational program   Occupational History    Occupation:        Objective     General Comments     Spine Comments   Standin sec STS chair normal height no hands 7 reps no sxs    AROM all WFL ROM  Flexion WFL  Extension slight central pinch  RSB WFL  LSB WFL    Hip Screen all WFL normal mobility no sxs  CYRIL  FADIR/Quadrant  Flexion  IR  ER    SIJ [place]lett Cluster deferred  Compression in S/L  Distraction  POSH/sacrotuberous stress test  Prone sacral P-A      Hip strength   Extension deferred  Abduction R 3+/5, L 4-/5  ER 4+/5 ea      piriformis/soft tissues/P-As WFL no sxs in supine          Therapeutic Exercises (CPT 21272):     1. UPOC 3/4/25      Therapeutic Exercise Summary: Home Exercise Program Created and Reviewed with patient  S/L hip ABD 3x10  Bridges DL 2x60\"H  Brace with  3x5    Next---> Nu-step (intervals), doesn't like bands, progress posterolateral hip and trunk, weights scap/lumbar/fxl(?), functional strengthening,           Time-based treatments/modalities:    Physical Therapy Timed Treatment Charges  Therapeutic exercise minutes (CPT 56005): 15 minutes      Assessment, Response and Plan:   Impairments: impaired functional mobility, lacks appropriate home exercise program and pain with function    Assessment details:  PT finds s/sx consistent with LBP with referral with motor coordination/power deficits. " This is evident with  subjective pattern/location of pain with sxs along with contributing factors of  decreased hip and trunk muscle function. She can benefit from skilled PT care to address her trunk and hip strength to improve pain with prolonged activity. She is also below her peers with a 30 sec STS transfer for her LE strength.  Barriers to therapy:  Comorbidities  Prognosis: fair    Goals:   Short Term Goals:   -pt meets MCID for RMQ (eval score 50)  -pt walks >15 min on avg with mild local LBP  -pt without issues doing chores <5 min >90% of the time  -pt with indep with HEP >50% of the time  Short term goal time span:  2-4 weeks      Long Term Goals:    -pt meets MCID for RMQ  -pt walks >20 min on avg with mild local LBP to improve travel  -pt without issues doing chores <30 min >90% of the time  -pt with indep with HEP >90% of the time  -pt meets MCID of 2-3 reps for 30 sec STS (eval 7 reps)  Long term goal time span:  6-8 weeks    Plan:   Therapy options:  Physical therapy treatment to continue  Planned therapy interventions:  E Stim Unattended (CPT 33187), E Stim Attended (CPT 15883), Manual Therapy (CPT 87013), Mechanical Traction (CPT 65562), Neuromuscular Re-education (CPT 78156), Hot or Cold Pack Therapy (CPT 49180), Therapeutic Activities (CPT 14933) and Therapeutic Exercise (CPT 72329)  Frequency:  2x week  Duration in weeks:  8  Duration in visits:  16  Discussed with:  Patient      Functional Assessment Used  [employer] Low Back Pain and Disability Score: 50     Referring provider co-signature:  I have reviewed this plan of care and my co-signature certifies the need for services.    Certification Period: 02/04/2025 to  04/08/25    Physician Signature: ________________________________ Date: ______________

## 2025-02-06 ENCOUNTER — APPOINTMENT (OUTPATIENT)
Dept: MEDICAL GROUP | Facility: PHYSICIAN GROUP | Age: 70
End: 2025-02-06
Payer: MEDICARE

## 2025-02-06 ENCOUNTER — PHYSICAL THERAPY (OUTPATIENT)
Dept: PHYSICAL THERAPY | Facility: REHABILITATION | Age: 70
End: 2025-02-06
Attending: FAMILY MEDICINE
Payer: MEDICARE

## 2025-02-06 VITALS
HEART RATE: 70 BPM | WEIGHT: 242.8 LBS | TEMPERATURE: 97.9 F | SYSTOLIC BLOOD PRESSURE: 120 MMHG | BODY MASS INDEX: 41.45 KG/M2 | HEIGHT: 64 IN | DIASTOLIC BLOOD PRESSURE: 70 MMHG | OXYGEN SATURATION: 96 %

## 2025-02-06 DIAGNOSIS — E66.9 OBESITY (BMI 30-39.9): ICD-10-CM

## 2025-02-06 DIAGNOSIS — E78.00 PURE HYPERCHOLESTEROLEMIA: ICD-10-CM

## 2025-02-06 DIAGNOSIS — I10 ESSENTIAL HYPERTENSION: ICD-10-CM

## 2025-02-06 DIAGNOSIS — R91.1 LUNG NODULE: ICD-10-CM

## 2025-02-06 DIAGNOSIS — M43.16 SPONDYLOLISTHESIS OF LUMBAR REGION: ICD-10-CM

## 2025-02-06 DIAGNOSIS — R73.03 PREDIABETES: Chronic | ICD-10-CM

## 2025-02-06 PROBLEM — N39.3 FEMALE STRESS INCONTINENCE: Status: ACTIVE | Noted: 2024-11-18

## 2025-02-06 PROBLEM — N39.0 URINARY TRACT INFECTIOUS DISEASE: Status: ACTIVE | Noted: 2024-11-18

## 2025-02-06 PROCEDURE — 3078F DIAST BP <80 MM HG: CPT | Performed by: FAMILY MEDICINE

## 2025-02-06 PROCEDURE — 99214 OFFICE O/P EST MOD 30 MIN: CPT | Performed by: FAMILY MEDICINE

## 2025-02-06 PROCEDURE — 97110 THERAPEUTIC EXERCISES: CPT

## 2025-02-06 PROCEDURE — 3074F SYST BP LT 130 MM HG: CPT | Performed by: FAMILY MEDICINE

## 2025-02-06 RX ORDER — EZETIMIBE 10 MG/1
10 TABLET ORAL DAILY
Qty: 90 TABLET | Refills: 0 | Status: SHIPPED | OUTPATIENT
Start: 2025-02-06

## 2025-02-06 RX ORDER — EZETIMIBE 10 MG/1
10 TABLET ORAL DAILY
Qty: 90 TABLET | Refills: 0 | Status: SHIPPED
Start: 2025-02-06 | End: 2025-02-06

## 2025-02-06 ASSESSMENT — ENCOUNTER SYMPTOMS: GENERAL WELL-BEING: FAIR

## 2025-02-06 ASSESSMENT — ACTIVITIES OF DAILY LIVING (ADL): BATHING_REQUIRES_ASSISTANCE: 0

## 2025-02-06 ASSESSMENT — PATIENT HEALTH QUESTIONNAIRE - PHQ9: CLINICAL INTERPRETATION OF PHQ2 SCORE: 0

## 2025-02-06 ASSESSMENT — FIBROSIS 4 INDEX: FIB4 SCORE: 1.34

## 2025-02-06 NOTE — PROGRESS NOTES
Chief Complaint   Patient presents with    Annual Exam     Medicare annual       HPI:  Jodi Lopez is a 69 y.o. here for Medicare Annual Wellness Visit     Patient Active Problem List    Diagnosis Date Noted    Lung nodule 02/06/2025    Urinary tract infectious disease 11/18/2024    Female stress incontinence 11/18/2024    Right rotator cuff tear 01/06/2023    Right shoulder pain 01/06/2023    Tear of right supraspinatus tendon 01/06/2023    Infraspinatus tendon tear, right, initial encounter 01/06/2023    Morbid obesity (HCC) 03/09/2022    Elevated blood pressure reading without diagnosis of hypertension 03/07/2022    Prediabetes 02/17/2022    Pure hypercholesterolemia 02/17/2022    Vaginal dryness, menopausal 02/17/2022    Memory changes 02/17/2022    Frequent headaches 02/17/2022    Carotid artery plaque, bilateral 02/17/2022    Delayed emergence from anesthesia 10/09/2019    Obesity (BMI 30-39.9) 10/09/2019    Bruit of left carotid artery 08/17/2018    Hemorrhoids 08/05/2016    Varicose vein 06/05/2015    Renal mass 09/13/2013    CKD (chronic kidney disease), stage I 09/13/2013    Essential hypertension 09/13/2013       Current Outpatient Medications   Medication Sig Dispense Refill    ezetimibe (ZETIA) 10 MG Tab Take 1 Tablet by mouth every day. 90 Tablet 0    celecoxib (CELEBREX) 100 MG Cap Take 1 Capsule by mouth every day. 90 Capsule 0    Omega-3 Fatty Acids (OMEGA-3 FISH OIL) 1200 MG Cap Take  by mouth.      Calcium Carbonate Antacid (TUMS ULTRA 1000) 1000 MG Chew Tab       estradiol (VAGIFEM) 10 MCG Tab Insert 10 mg into the vagina.      methocarbamol (ROBAXIN) 750 MG Tab Take 750 mg by mouth as needed.      Loratadine (ALAVERT PO) Take  by mouth as needed.      Coenzyme Q10 100 MG Cap Take  by mouth every day.      Multiple Vitamins-Minerals (AIRBORNE GUMMIES PO) Take  by mouth every day.      Non Formulary Request every day. OTC Nutri Calm      Non Formulary Request as needed. OTC Papaya  Mint      Diclofenac Sodium 1 % Gel Apply 1 Application to skin as directed 2 times a day as needed (PAIN).      Magnesium 400 MG Tab Take 1 Tablet by mouth every day.      vitamin D (CHOLECALCIFEROL) 1000 UNIT TABS Take 1,000 Units by mouth every day.         No current facility-administered medications for this visit.          Current supplements as per medication list.     Allergies: Penicillins, Percocet [apap-fd&c yellow #6 [place]-oxycodone], Amlodipine, Ammonia, Arnica, Atorvastatin calcium, Benzamycin, Erythromycin, Food, Lisinopril, Other environmental, Other misc, Oxycodone-acetaminophen, Penicillin g, Skin adhesives, Tape, and Vancomycin    Current social contact/activities: , vacations, motorcycle friends, breakfast meeting, dinner social     She  reports that she has never smoked. She has been exposed to tobacco smoke. She has never used smokeless tobacco. She reports current alcohol use. She reports that she does not use drugs.  Counseling given: Not Answered      ROS:    Gait: Uses a walker for long distances, wheelchair for travel   Ostomy: No  Other tubes: No  Amputations: No  Chronic oxygen use: No  Last eye exam: within 1 year  Wears hearing aids: No   : Denies any urinary leakage during the last 6 months    Screening:  Depression Screening  Little interest or pleasure in doing things?  0 - not at all  Feeling down, depressed , or hopeless? 0 - not at all  Patient Health Questionnaire Score: 0     If depressive symptoms identified deferred to follow up visit unless specifically addressed in assessment and plan.    Interpretation of PHQ-9 Total Score   Score Severity   1-4 No Depression   5-9 Mild Depression   10-14 Moderate Depression   15-19 Moderately Severe Depression   20-27 Severe Depression    Screening for Cognitive Impairment  Do you or any of your friends or family members have any concern about your memory?No No  Three Minute Recall (Leader, Season, Table) 33/3    [place] clock  face with all 12 numbers and set the hands to show 10 minutes after 11.  YesYes   Cognitive concerns identified deferred for follow up unless specifically addressed in assessment and plan.    Fall Risk Assessment  Has the patient had two or more falls in the last year or any fall with injury in the last year?  No    Safety Assessment  Do you always wear your seatbelt?  YesYes  Any changes to home needed to function safely? No No  Difficulty hearing.  NoNo  Patient counseled about all safety risks that were identified.    Functional Assessment ADLs  Are there any barriers preventing you from cooking for yourself or meeting nutritional needs?  NoNo.    Are there any barriers preventing you from driving safely or obtaining transportation?  NoNo.    Are there any barriers preventing you from using a telephone or calling for help? No No    Are there any barriers preventing you from shopping?  NoNo.    Are there any barriers preventing you from taking care of your own finances?  NoNo    Are there any barriers preventing you from managing your medications?  NoNo    Are there any barriers preventing you from showering, bathing or dressing yourself? NoNo    Are there any barriers preventing you from doing housework or laundry? NoNo  Are there any barriers preventing you from using the toilet?NoNo  Are you currently engaging in any exercise or physical activity?  YesYes      Self-Assessment of Health  What is your perception of your health? FairFair    Do you sleep more than six hours a night? Yes Yes   In the past 7 days, how much did pain keep you from doing your normal work? SomeYes    Do you spend quality time with family or friends (virtually or in person)? Yes Yes   Do you usually eat a heart healthy diet that constists of a variety of fruits, vegetables, whole grains and fiber? YesYes    Do you eat foods high in fat and/or Fast Food more than three times per week? No No   How concerned are you that your medical  conditions are not being well managed? Feeling good. Not at all    Are you worried that in the next 2 months, you may not have stable housing that you own, rent, or stay in as part of a household? No No     Advance Care Planning  Do you have an Advance Directive, Living Will, Durable Power of , or POLST? Yes    Living Will      Yes      Health Maintenance Summary            Overdue - Annual Wellness Visit (Yearly) Never done      No completion history exists for this topic.              Overdue - Colorectal Cancer Screening (View Topic Details) Never done      No completion history exists for this topic.              Overdue - Mammogram (Yearly) Overdue since 7/25/2024 07/25/2023  MA-SCREENING MAMMO BILAT W/TOMOSYNTHESIS W/CAD    05/12/2022  MA-SCREENING MAMMO BILAT W/TOMOSYNTHESIS W/CAD    05/25/2021  Outside Procedure: [facility] MA-SCREEN MAMMO UNI W/CAD    05/20/2020  MA-SCREENING MAMMO BILAT W/TOMOSYNTHESIS W/CAD    02/21/2019  MA-SCREENING MAMMO BILAT W/TOMOSYNTHESIS W/CAD    Only the first 5 history entries have been loaded, but more history exists.              IMM DTaP/Tdap/Td Vaccine (4 - Td or Tdap) Next due on 6/11/2029 06/11/2019  Imm Admin: Tdap Vaccine    06/11/2019  Imm Admin: Tdap Vaccine    12/01/2015  Imm Admin: Tdap Vaccine              Bone Density Scan (Every 5 Years) Next due on 12/2/2029 12/02/2024  DS-BONE DENSITY STUDY (DEXA)    11/28/2022  DS-BONE DENSITY STUDY (DEXA)    11/10/2020  DS-BONE DENSITY STUDY (DEXA)    06/22/2015  DS-BONE DENSITY STUDY (DEXA)    06/14/2013  DS-BONE DENSITY STUDY (DEXA)    Only the first 5 history entries have been loaded, but more history exists.              Zoster (Shingles) Vaccines (Series Information) Completed      07/20/2020  Imm Admin: Zoster Vaccine Recombinant (RZV) (SHINGRIX)    07/20/2020  Imm Admin: Zoster Vaccine Recombinant (RZV) (SHINGRIX)    03/03/2020  Imm Admin: Zoster Vaccine Recombinant (RZV) (SHINGRIX)    03/03/2020  Imm  Admin: Zoster Vaccine Recombinant (RZV) (SHINGRIX)    06/11/2014  Imm Admin: Zoster Vaccine Live (ZVL) (Zostavax) - HISTORICAL DATA    Only the first 5 history entries have been loaded, but more history exists.              Pneumococcal Vaccine: 65+ Years (Series Information) Completed      11/17/2020  Imm Admin: Pneumococcal polysaccharide vaccine (PPSV-23)    11/17/2020  Imm Admin: Pneumococcal polysaccharide vaccine (PPSV-23)    11/26/2019  Imm Admin: Pneumococcal Conjugate Vaccine (Prevnar/PCV-13)    11/26/2019  Imm Admin: Pneumococcal Conjugate Vaccine (Prevnar/PCV-13)              Influenza Vaccine (Series Information) Completed      09/10/2024  Imm Admin: Influenza, unspecified formulation    10/23/2023  Imm Admin: Influenza Vaccine, Quadrivalent, Adjuvanted (Pf)    11/18/2022  Imm Admin: Influenza Vaccine, Quadrivalent, Adjuvanted (Pf)    10/12/2021  Imm Admin: Influenza high-dose trivalent (PF)    10/12/2021  Imm Admin: Influenza Vaccine Adult HD    Only the first 5 history entries have been loaded, but more history exists.              COVID-19 Vaccine (Series Information) Completed      09/10/2024  Imm Admin: COVID-19, mRNA, LNP-S, PF, sathish-sucrose, 30 mcg/0.3 mL    01/23/2024  Imm Admin: Covid-19 Mrna (Spikevax) Moderna 12+ Years    11/21/2022  Imm Admin: PFIZER BIVALENT SARS-COV-2 VACCINE (12+)    06/03/2022  Imm Admin: PFIZER HAGAN CAP SARS-COV-2 VACCINATION (12+)    11/04/2021  Imm Admin: DataSphere SARS-CoV-2 Vaccine    Only the first 5 history entries have been loaded, but more history exists.              Hepatitis C Screening  Completed      12/07/2024  Hepatitis C Antibody component of HEP C VIRUS ANTIBODY              Hepatitis A Vaccine (Hep A) (Series Information) Aged Out      No completion history exists for this topic.              Hepatitis B Vaccine (Hep B) (Series Information) Aged Out      No completion history exists for this topic.              HPV Vaccines (Series Information) Aged Out       No completion history exists for this topic.              Polio Vaccine (Inactivated Polio) (Series Information) Aged Out      No completion history exists for this topic.              Meningococcal Immunization (Series Information) Aged Out      No completion history exists for this topic.                    Patient Care Team:  Larisa Eason M.D. as PCP - General (Family Medicine)  Beka Ritchie, PT as Physical Therapist (Physical Therapy)        Social History     Tobacco Use    Smoking status: Never     Passive exposure: Yes    Smokeless tobacco: Never   Vaping Use    Vaping status: Never Used   Substance Use Topics    Alcohol use: Yes     Comment: 1-2 per week    Drug use: No     Family History   Problem Relation Age of Onset    Heart Disease Other     Arthritis Neg Hx     Cancer Neg Hx      She  has a past medical history of Anesthesia, Arrhythmia, Arthritis, Blood clotting disorder (HCC) (1981), Cataract, Chronic back pain, Cold sore (8/1/2016), Heart burn, Listeria meningitis (1985), NEGATIVE HISTORY OF, Pain, Pain, Pins and needles sensation, Renal disorder (2016), Snoring, TIA (transient ischemic attack) (10/2015), and TIA (transient ischemic attack) (2015).   Past Surgical History:   Procedure Laterality Date    PB SHLDR ARTHROSCOP,SURG,W/ROTAT CUFF REPB Right 5/4/2023    Procedure: Right shoulder arthroscopic rotator cuff repair, subacromial decompression, distal clavicle, excision, labral debridement;  Surgeon: Devante Lemon M.D.;  Location: SURGERY [facility];  Service: Orthopedics    PB KNEE SCOPE,DIAGNOSTIC Right 6/4/2020    Procedure: ARTHROSCOPY, KNEE;  Surgeon: Valentín Draper M.D.;  Location: SURGERY [facility];  Service: Orthopedics    MENISCECTOMY, KNEE, MEDIAL Right 6/4/2020    Procedure: MENISCECTOMY, KNEE, MEDIAL,LATERAL;  Surgeon: Valentín Draper M.D.;  Location: SURGERY [facility];  Service: Orthopedics    ORIF, WRIST Right 10/9/2019    Procedure: ORIF, WRIST- DISTAL  "RADIUS;  Surgeon: Kwabena Michelle M.D.;  Location: SURGERY [facility];  Service: Orthopedics    LIGAMENT REPAIR Right 10/9/2019    Procedure: REPAIR, LIGAMENT- SCAPHOLUNATE;  Surgeon: Kwabena Michelle M.D.;  Location: SURGERY [facility];  Service: Orthopedics    HEMORRHOIDECTOMY  8/5/2016    Procedure: HEMORRHOIDECTOMY SINGLE QUADRANT;  Surgeon: Alpesh Gregory M.D.;  Location: SURGERY [facility];  Service:     OTHER  2015    varicose vein right leg    OTHER  2015    cataracts bilateral    OTHER  2010    breast biopsy    BREAST BIOPSY      TONSILLECTOMY      TUBAL LIGATION         Exam:   /70 (BP Location: Right arm, Patient Position: Sitting, BP Cuff Size: Large adult)   Pulse 70   Temp 36.6 °C (97.9 °F) (Temporal)   Ht 1.626 m (5' 4\")   Wt 110 kg (242 lb 12.8 oz)   SpO2 96%  Body mass index is 41.68 kg/m².    Hearing good.    Dentition good  Alert, oriented in no acute distress.  Eye contact is good, speech goal directed, affect calm    Assessment and Plan. The following treatment and monitoring plan is recommended:    1. Lung nodule    2. Pure hypercholesterolemia  - ezetimibe (ZETIA) 10 MG Tab; Take 1 Tablet by mouth every day.  Dispense: 90 Tablet; Refill: 0    3. Prediabetes    4. Obesity (BMI 30-39.9)    5. Essential hypertension      Services suggested: No services needed at this time  Health Care Screening: Age-appropriate preventive services recommended by USPTF and ACIP covered by Medicare were discussed today. Services ordered if indicated and agreed upon by the patient.  Referrals offered: Community-based lifestyle interventions to reduce health risks and promote self-management and wellness, fall prevention, nutrition, physical activity, tobacco-use cessation, weight loss, and mental health services as per orders if indicated.    Discussion today about general wellness and lifestyle habits:    Prevent falls and reduce trip hazards; Cautioned about securing or removing " rugs.  Have a working fire alarm and carbon monoxide detector;   Engage in regular physical activity and social activities     Follow-up: Return in about 3 months (around 5/6/2025).    Larisa Eason MD  Family Medicine and Non - Operative Sports Medicine   [facility]

## 2025-02-06 NOTE — OP THERAPY DAILY TREATMENT
"  Outpatient Physical Therapy  DAILY TREATMENT     [facility] Outpatient Physical Therapy [address] [facility], [address]  [place] [address]  Phone:  [phone]    Date: 02/06/2025  Patient: Jodi Lopez  YOB: 1955  MRN: [id number]   Time Calculation  Start time: 0215  Stop time: 0255 Time Calculation (min): 40 minutes   Chief Complaint: No chief complaint on file.  Visit #: 2    SUBJECTIVE: Pt notes no new changes since eval.     Sxs: 10//10 pain at worst, mild to moerate other times. P1 B back ache across both sides; P2 nauseated/achy pain up to middle      Aggs: bending over (P1), cleaning cabinets/chores/packing a suitcase (has to take breaks due to the pain), walking prolonged, sitting unsupported P1-2    OBJECTIVE:  Current objective measures:   Extension slight central pinch   ---> SI screens,         Therapeutic Exercises (CPT 45083):     1. 1. UPOC 3/4/25    2. Nu step, 10', L2-5.5 seat 20-21    5. Brace with LE march, 2x10    6. bridging, x60\"H    7. sidelying hip abd, x10. x5 ea, R side x5 to fatigue    9. prone hip extension over table, x15ea, 90/90 hip    10. STS, 2x15, 10#      Therapeutic Exercise Summary: HEP  S/L hip ABD 3x10  Bridges DL 2x60\"H  Brace with LE march 3x5     Next---> Nu-step (intervals), doesn't like bands, progress posterolateral hip and trunk, weights scap/lumbar/fxl(?), functional strengthening,     Time-based treatments/modalities:  Physical Therapy Timed Treatment Charges  Therapeutic exercise minutes (CPT 05033): 40 minutes  ASSESSMENT:   Response to treatment: Pt with cont deficits in her overall trunk/hip stability. Progress as tolerated.    PLAN/RECOMMENDATIONS:   Plan for treatment: therapy treatment to continue next visit.  Planned interventions for next visit: continue with current treatment.         "

## 2025-02-10 NOTE — OP THERAPY DAILY TREATMENT
"Outpatient Physical Therapy  DAILY TREATMENT   [facility] Outpatient Physical Therapy [address] [facility], [address]  [place] [address]  Phone:  [phone]  Date: 02/11/2025  Patient: Jodi Lopez  YOB: 1955  MRN: [id number]   Time Calculation  Start time: 0815  Stop time: 0900 Time Calculation (min): 45 minutes   Chief Complaint: No chief complaint on file.  Visit #: 3    SUBJECTIVE: Notes at times some achiness at the R knee; compression feeling in the lower back with STS exercises. One time this wasn't there.     Sxs: 10/10 pain at worst, mild to moerate other times. P1 B back ache across both sides; P2 nauseated/achy pain up to middle      Aggs: bending over (P1), cleaning cabinets/chores/packing a suitcase (has to take breaks due to the pain), walking prolonged, sitting unsupported P1-2    OBJECTIVE:   Current objective measures: none taken  Extension slight central pinch   ---> SI screens        Therapeutic Exercises (CPT 88196):     1. 1. UPOC 3/4/25    2. Nu step, 12', L2-5.5 seat 21    3. self traction over table, x1'    5. Brace with LE march, 2x10    6. bridging, 3x60\"H, ball at lower calves    7. sidelying hip abd, x13/x10 ea, R faituges at 13    9. prone hip extension over table, x15ea, 90/90 hip; reviewed    10. STS, 2x15, 10#    11. mini chop, 2x10, blckTB handles      Therapeutic Exercise Summary: HEP  S/L hip ABD 3x10  Bridges DL 2x60\"H on ball  Brace with LE march 3x5  Hip ext over table  STS 10#     Next---> Nu-step (intervals), doesn't like bands, progress posterolateral hip and trunk, weights scap/lumbar/fxl(?), functional strengthening,       Time-based treatments/modalities:  Physical Therapy Timed Treatment Charges  Therapeutic exercise minutes (CPT 57272): 40 minutes  ASSESSMENT: pt improving in outer hip reps to fatigue. Still very weak overall with trunk stability. Cont as tolerated.    PLAN/RECOMMENDATIONS:   Plan for treatment: therapy treatment to continue next " visit.  Planned interventions for next visit: continue with current treatment.

## 2025-02-11 ENCOUNTER — PHYSICAL THERAPY (OUTPATIENT)
Dept: PHYSICAL THERAPY | Facility: REHABILITATION | Age: 70
End: 2025-02-11
Attending: FAMILY MEDICINE
Payer: MEDICARE

## 2025-02-11 DIAGNOSIS — M43.16 SPONDYLOLISTHESIS OF LUMBAR REGION: ICD-10-CM

## 2025-02-11 PROCEDURE — 97110 THERAPEUTIC EXERCISES: CPT

## 2025-02-12 ENCOUNTER — HOSPITAL ENCOUNTER (OUTPATIENT)
Dept: RADIOLOGY | Facility: MEDICAL CENTER | Age: 70
End: 2025-02-12
Attending: FAMILY MEDICINE
Payer: MEDICARE

## 2025-02-12 DIAGNOSIS — Z12.31 VISIT FOR SCREENING MAMMOGRAM: ICD-10-CM

## 2025-02-12 PROCEDURE — 77067 SCR MAMMO BI INCL CAD: CPT

## 2025-02-13 ENCOUNTER — PHYSICAL THERAPY (OUTPATIENT)
Dept: PHYSICAL THERAPY | Facility: REHABILITATION | Age: 70
End: 2025-02-13
Attending: FAMILY MEDICINE
Payer: MEDICARE

## 2025-02-13 DIAGNOSIS — M43.16 SPONDYLOLISTHESIS OF LUMBAR REGION: ICD-10-CM

## 2025-02-13 PROCEDURE — 97110 THERAPEUTIC EXERCISES: CPT

## 2025-02-13 NOTE — OP THERAPY DAILY TREATMENT
"Outpatient Physical Therapy  DAILY TREATMENT   [facility] Outpatient Physical Therapy [place] [address] [facility], [address]  [place] [address]  Phone:  [phone]  Date: 02/13/2025  Patient: Jodi Lopez  YOB: 1955  MRN: [id number]   Time Calculation  Start time: 1330  Stop time: 1415 Time Calculation (min): 45 minutes   Chief Complaint: Back Problem  Visit #: 4    SUBJECTIVE:    She will have her epidural for back on the 18th. She is hoping for some good pain relief in the back when this is performed since she has back pain \"all the time.\"     Sxs: 10/10 pain at worst, mild to moerate other times. P1 B back ache across both sides; P2 nauseated/achy pain up to middle      Aggs: bending over (P1), cleaning cabinets/chores/packing a suitcase (has to take breaks due to the pain), walking prolonged, sitting unsupported P1-2    OBJECTIVE:   Current objective measures: none taken  Extension slight central pinch   ---> SI screens        Therapeutic Exercises (CPT 83082):     1. 1. UPOC 3/4/25    2. Nu step, 12', L2-5.5 seat 21    3. self traction over table, x1'    5. Brace with LE march, 2x10    6. bridging, 3x60\"H, ball at lower calves    7. sidelying hip abd, 2x16/x15 ea, R faituges at 16    9. prone hip extension over table, x15ea, 90/90 hip; reviewed    10. STS, 2x15, 10#    11. mini chop, 2x10, blckTB handles    12. seated lat pulls, 2x15, blck TB handles      Therapeutic Exercise Summary: HEP  S/L hip ABD 3x10  Bridges DL 2x60\"H on ball  Brace with LE march 3x5  Hip ext over table  STS 10#     Next---> Nu-step (intervals), doesn't like bands, progress posterolateral hip and trunk, weights scap/lumbar/fxl(?), functional strengthening,       Time-based treatments/modalities:  Physical Therapy Timed Treatment Charges  Therapeutic exercise minutes (CPT 46484): 45 minutes  ASSESSMENT:   She demonstrated good motivation during session, able to increase repetitions and sets for side lying hip abduction. " She was able to tolerate new trunk strengthening exercise without increased lumbar spine pain. No adverse reactions noted during session.     PLAN/RECOMMENDATIONS:   Plan for treatment: therapy treatment to continue next visit.  Planned interventions for next visit: continue with current treatment.

## 2025-02-17 ENCOUNTER — RESULTS FOLLOW-UP (OUTPATIENT)
Dept: MEDICAL GROUP | Facility: PHYSICIAN GROUP | Age: 70
End: 2025-02-17

## 2025-02-17 NOTE — OP THERAPY DAILY TREATMENT
"Outpatient Physical Therapy  DAILY TREATMENT   [facility] Outpatient Physical Therapy [address], [address]  [place] [address]  Phone:  [phone]  Date: 02/18/2025  Patient: Jodi Lopez  YOB: 1955  MRN: [id number]   Time Calculation  Start time: 0815  Stop time: 0855 Time Calculation (min): 40 minutes   Chief Complaint: No chief complaint on file.  Visit #: 5    SUBJECTIVE: Pt indicates that her session went well. She has an epidural scheduled this PM.     Sxs: 10/10 pain at worst, mild to moerate other times. P1 B back ache across both sides; P2 nauseated/achy pain up to middle      Aggs: bending over (P1), cleaning cabinets/chores/packing a suitcase (has to take breaks due to the pain), walking prolonged, sitting unsupported P1-2    OBJECTIVE:   Current objective measures:   30 sec STS 10 reps    none taken  Extension slight central pinch   ---> SI screens        Therapeutic Exercises (CPT 23889):     1. 1. UPOC 3/4/25    2. Nu step, 12', L2-5.5 seat 21    3. self traction over table, x1'    5. Brace with LE march + UE, 2x10, \"dying bug\"    6. bridging, 3x60\"H, ball at lower calves    7. sidelying hip abd, 2x15+ea, R faituges at 19    9. prone hip extension over table, x15ea, 90/90 hip; reviewed    10. STS, 2x15, 10#    11. mini chop, 2x10, blckTB handles    12. seated lat pulls, 2x15, blck TB handles      Therapeutic Exercise Summary: HEP  S/L hip ABD 3x10  Bridges DL 2x60\"H on ball  Brace with LE march 3x5  Hip ext over table  STS 10#     Next---> Nu-step (intervals), doesn't like bands, progress posterolateral hip and trunk, weights scap/lumbar/fxl(?), functional strengthening,       Time-based treatments/modalities:  Physical Therapy Timed Treatment Charges  Therapeutic exercise minutes (CPT 84196): 40 minutes  ASSESSMENT: no reports of inc pain during the session; improving strength with functional exercises such as 30 sec STS.     PLAN/RECOMMENDATIONS:   Plan for treatment: " therapy treatment to continue next visit.  Planned interventions for next visit: continue with current treatment.

## 2025-02-18 ENCOUNTER — PHYSICAL THERAPY (OUTPATIENT)
Dept: PHYSICAL THERAPY | Facility: REHABILITATION | Age: 70
End: 2025-02-18
Attending: FAMILY MEDICINE
Payer: MEDICARE

## 2025-02-18 DIAGNOSIS — M43.16 SPONDYLOLISTHESIS OF LUMBAR REGION: ICD-10-CM

## 2025-02-18 PROCEDURE — 97110 THERAPEUTIC EXERCISES: CPT

## 2025-02-19 NOTE — OP THERAPY DAILY TREATMENT
"Outpatient Physical Therapy  DAILY TREATMENT   [facility] Outpatient Physical Therapy [address] [facility], [address]  [place] [address]  Phone:  [phone]  Date: 02/20/2025  Patient: Jodi Lopez  YOB: 1955  MRN: [id number]   Time Calculation           Chief Complaint: No chief complaint on file.  Visit #: 6    SUBJECTIVE: Feels the epidural helped. Put in L4/5 level. She f.u. with pain management when she gets back from an upcoming vacation at the end of March.     Sxs: 10/10 pain at worst, mild to moerate other times. P1 B back ache across both sides; P2 nauseated/achy pain up to middle      Aggs: bending over (P1), cleaning cabinets/chores/packing a suitcase (has to take breaks due to the pain), walking prolonged, sitting unsupported P1-2    OBJECTIVE:    Current objective measures:   30 sec STS 10 reps    none taken  Extension slight central pinch   ---> SI screens        Therapeutic Exercises (CPT 31736):     1. 1. UPOC 3/4/25    2. Nu step, 12', L2-5.5 seat 21    3. self traction over table, x1'    5. Brace with LE march + UE, 2x10, \"dying bug\"; hard at 40 sec    6. bridging, 2x60\"H, ball at lower calves    7. sidelying hip abd, 2x15+ea, R faituges at 19    8. carries, 2x150', 10#    9. prone hip extension over table, x10ea; with opposite arm and leg    10. STS, 2x15, 10#; ball squats today    11. mini chop, 2x10, blckTB handles    12. seated lat pulls, 2x15, blck TB handles      Therapeutic Exercise Summary: HEP  S/L hip ABD 3x10  Bridges DL 2x60\"H on ball  Brace with LE march 3x5  Hip ext over table  STS 10#     Next---> Nu-step (intervals), doesn't like bands, progress posterolateral hip and trunk, weights scap/lumbar/fxl(?), functional strengthening,     Time-based treatments/modalities:     ASSESSMENT: Did well today; no reports of pain today.     PLAN/RECOMMENDATIONS:   Plan for treatment: therapy treatment to continue next visit.  Planned interventions for next visit: continue " with current treatment.

## 2025-02-20 ENCOUNTER — PHYSICAL THERAPY (OUTPATIENT)
Dept: PHYSICAL THERAPY | Facility: REHABILITATION | Age: 70
End: 2025-02-20
Attending: FAMILY MEDICINE
Payer: MEDICARE

## 2025-02-20 DIAGNOSIS — M43.16 SPONDYLOLISTHESIS OF LUMBAR REGION: ICD-10-CM

## 2025-02-20 PROCEDURE — 97161 PT EVAL LOW COMPLEX 20 MIN: CPT

## 2025-02-20 PROCEDURE — 97110 THERAPEUTIC EXERCISES: CPT

## 2025-02-24 NOTE — OP THERAPY DAILY TREATMENT
"Outpatient Physical Therapy  DAILY TREATMENT   [facility] Outpatient Physical Therapy [address] [facility], [address]  [place] [address]  Phone:  [phone]    Date: 02/25/2025  Patient: Jodi Lopez  YOB: 1955  MRN: [id number]   Time Calculation  Start time: 0130  Stop time: 0215 Time Calculation (min): 45 minutes   Chief Complaint: No chief complaint on file.  Visit #: 7    SUBJECTIVE: Having more pain central pain. Feels \"stuck\" today whereas other monrings things loosen here.     Sxs: 10/10 pain at worst, mild to moerate other times. P1 B back ache across both sides; P2 nauseated/achy pain up to middle      Aggs: bending over (P1), cleaning cabinets/chores/packing a suitcase (has to take breaks due to the pain), walking prolonged, sitting unsupported P1-2    OBJECTIVE:    Current objective measures:   30 sec STS 10 reps    none taken  Extension slight central pinch   ---> SI screens        Therapeutic Exercises (CPT 38405):     1. 1. UPOC 3/4/25    2. Nu step, 12', L2-5.5 seat 21    3. self traction over table, x1'    5. Brace with LE march + UE, 2x10, \"dying bug\"; hard at 40 sec    6. bridging, 2x60\"H, ball at lower calves    7. sidelying hip abd, 2x15+ea, R faituges at 19    8. carries, 2x150', 10#---> 15    9. prone hip extension over table, x10ea; with opposite arm and leg    10. STS, 2x15, 10#; ball squats (today shuttle 8B DL x30)    11. mini chop, 2x10, blckTB handles    12. seated lat pulls, 2x15, blck TB handles    13. RDL stick, x60\"H, hard in legs per pt    14. today one set of above; time edu on gym [facility] [employer]      Therapeutic Exercise Summary: HEP  S/L hip ABD 3x10  Bridges DL 2x60\"H on ball  Brace with LE march 3x5  Hip ext over table  STS 10#     Next---> RDL; bent over rows???     Time-based treatments/modalities:  Physical Therapy Timed Treatment Charges  Therapeutic exercise minutes (CPT 83955): 45 minutes  ASSESSMENT: Pt came in with more pain today; tolerated most " of her exercises without pain. S/L hip ABD brings on lateral thigh sxs more today.     PLAN/RECOMMENDATIONS:   Plan for treatment: therapy treatment to continue next visit.  Planned interventions for next visit: continue with current treatment.

## 2025-02-25 ENCOUNTER — PHYSICAL THERAPY (OUTPATIENT)
Dept: PHYSICAL THERAPY | Facility: REHABILITATION | Age: 70
End: 2025-02-25
Attending: FAMILY MEDICINE
Payer: MEDICARE

## 2025-02-25 DIAGNOSIS — M43.16 SPONDYLOLISTHESIS OF LUMBAR REGION: ICD-10-CM

## 2025-02-25 PROCEDURE — 97110 THERAPEUTIC EXERCISES: CPT

## 2025-02-25 NOTE — OP THERAPY DAILY TREATMENT
"Outpatient Physical Therapy  DAILY TREATMENT   [facility] Outpatient Physical Therapy [address], [address]  [place] [address]  Phone:  [phone]    Date: 02/27/2025  Patient: Jodi Lopez  YOB: 1955  MRN: [id number]   Time Calculation           Chief Complaint: No chief complaint on file.  Visit #: 8    SUBJECTIVE: Notes overall feeling good with what she has been doing in PT.     Sxs: 10/10 pain at worst, mild to moerate other times. P1 B back ache across both sides; P2 nauseated/achy pain up to middle      Aggs: bending over (P1), cleaning cabinets/chores/packing a suitcase (has to take breaks due to the pain), walking prolonged, sitting unsupported P1-2    OBJECTIVE:    Current objective measures: none below:  30 sec STS 10 reps    none taken  Extension slight central pinch   ---> SI screens        Therapeutic Exercises (CPT 11962):     1. 1. UPOC 3/4/25    2. Nu step, 10', L4-6 seat 21    3. self traction over table, x1', NT    5. Brace with LE march + UE, x40\", \"dying bug\"; hard at 40 sec    6. bridging, 2x60\"H, ball at lower calves    7. sidelying hip abd, x15+ea, R faituges at 19    8. carries, 2x150', 10#---> 15    9. prone hip extension over table, x10ea; with opposite arm and leg    10. STS, 2x15, 10#; ball squats (today no shuttle 8B DL x30)    11. mini chop, x10, palloff x10, blckTB handles    12. seated lat pulls, 2x15, big purp TB handles    13. RDL stick, 3x10\"H, x60\"H hard in legs per pt    14. ---> bent over rows?      Therapeutic Exercise Summary: HEP  S/L hip ABD 3x10  Bridges DL 2x60\"H on ball  Brace with LE march 3x5  Hip ext over table  STS 10#     Next---> RDL; bent over rows???     Time-based treatments/modalities:     ASSESSMENT: No pain reported; cont to progress her overall strength.     PLAN/RECOMMENDATIONS:   Plan for treatment: therapy treatment to continue next visit.  Planned interventions for next visit: continue with current treatment.         "

## 2025-02-27 ENCOUNTER — PHYSICAL THERAPY (OUTPATIENT)
Dept: PHYSICAL THERAPY | Facility: REHABILITATION | Age: 70
End: 2025-02-27
Attending: FAMILY MEDICINE
Payer: MEDICARE

## 2025-02-27 DIAGNOSIS — M43.16 SPONDYLOLISTHESIS OF LUMBAR REGION: ICD-10-CM

## 2025-02-27 PROCEDURE — 97110 THERAPEUTIC EXERCISES: CPT

## 2025-03-03 NOTE — OP THERAPY DAILY TREATMENT
"Outpatient Physical Therapy  DAILY TREATMENT   [facility] Outpatient Physical Therapy [address] [facility], [address]  [place] [address]  Phone:  [phone]    Date: 03/04/2025  Patient: Jodi Lopez  YOB: 1955  MRN: [id number]   Time Calculation           Chief Complaint: No chief complaint on file.  Visit #: 9    SUBJECTIVE: sore in mm of back after last session. She notes that she hasn't been as high as 10     Sxs: 10/10 pain at worst, mild to moerate other times. P1 B back ache across both sides; P2 nauseated/achy pain up to middle      Aggs: bending over (P1), cleaning cabinets/chores/packing a suitcase (has to take breaks due to the pain), walking prolonged, sitting unsupported P1-2    OBJECTIVE:    Current objective measures: none below:  30 sec STS 10 reps    none taken  Extension slight central pinch   ---> SI screens        Therapeutic Exercises (CPT 48359):     1. 1. UPOC 3/4/25    2. Nu step, 12', L5-6 seat 21    3. self traction over table, x1', NT    5. Brace with LE march + UE, x40\", \"dying bug\"; hard at 45 sec    6. bridging, 2x60\"H, ball at lower calves    7. sidelying hip abd, x15+ea, R faituges at 20; L 21    8. carries, 2x150', 15#--->    9. prone hip extension over table, x10ea; with opposite arm and leg    10. STS, 2x15, 10#; ball squats (today no shuttle 8B DL x30)    11. mini chop, x10, palloff x10, blckTB handles    12. seated lat pulls, 2x15, big purp TB handles    13. RDL stick, x10, 5# bar x10 (pulling L/S improves), x60\"H hard in legs per pt---->    14. ---> bent over rows?      Therapeutic Exercise Summary: HEP  S/L hip ABD 3x10  Bridges DL 2x60\"H on ball  Brace with LE march 3x5  Hip ext over table  STS 10#     Next---> RDL; bent over rows???     Time-based treatments/modalities:     ASSESSMENT: pt cont to improve slightly with her strength measured to fatigue. Given updates for HEP and email as she will be on vacation. Plan to see her once she " returns.    PLAN/RECOMMENDATIONS:   Plan for treatment: therapy treatment to continue next visit.  Planned interventions for next visit: continue with current treatment.

## 2025-03-04 ENCOUNTER — PHYSICAL THERAPY (OUTPATIENT)
Dept: PHYSICAL THERAPY | Facility: REHABILITATION | Age: 70
End: 2025-03-04
Attending: FAMILY MEDICINE
Payer: MEDICARE

## 2025-03-04 DIAGNOSIS — M43.16 SPONDYLOLISTHESIS OF LUMBAR REGION: ICD-10-CM

## 2025-03-04 PROCEDURE — 97110 THERAPEUTIC EXERCISES: CPT

## 2025-03-06 ENCOUNTER — APPOINTMENT (OUTPATIENT)
Dept: PHYSICAL THERAPY | Facility: REHABILITATION | Age: 70
End: 2025-03-06
Attending: FAMILY MEDICINE
Payer: MEDICARE

## 2025-03-31 RX ORDER — CELECOXIB 100 MG/1
100 CAPSULE ORAL DAILY
Qty: 90 CAPSULE | Refills: 0 | Status: SHIPPED | OUTPATIENT
Start: 2025-03-31

## 2025-03-31 NOTE — TELEPHONE ENCOUNTER
Received request via: Patient    Was the patient seen in the last year in this department? Yes    Does the patient have an active prescription (recently filled or refills available) for medication(s) requested? No    Pharmacy Name: [id number] - [place], [place] - [address]     Does the patient have care home Plus and need 100-day supply? (This applies to ALL medications) Patient does not have SCP

## 2025-04-04 ENCOUNTER — PHYSICAL THERAPY (OUTPATIENT)
Dept: PHYSICAL THERAPY | Facility: REHABILITATION | Age: 70
End: 2025-04-04
Attending: FAMILY MEDICINE
Payer: MEDICARE

## 2025-04-04 DIAGNOSIS — M43.16 SPONDYLOLISTHESIS OF LUMBAR REGION: ICD-10-CM

## 2025-04-04 PROCEDURE — 97110 THERAPEUTIC EXERCISES: CPT

## 2025-04-04 NOTE — OP THERAPY PROGRESS SUMMARY
Outpatient Physical Therapy  PROGRESS SUMMARY NOTE      [facility]  [address], [address]  [place] [address]  Phone:  [phone]    Date of Visit: 04/04/2025    Patient: Jodi Lopez  YOB: 1955  MRN: [id number]     Referring Provider: Larisa Eason M.D.  [address]  [place] [address]   Referring Diagnosis Spondylolisthesis, lumbar region [M43.16]     Visit Diagnoses     ICD-10-CM   1. Spondylolisthesis of lumbar region  M43.16       Rehab Potential: fair to good    Progress Report Period: 2/4/25 - 4/4/25    Functional Assessment Used: NT        Progress Summary Details  Patient progression towards goals:  Pt has been seen for about 2 months since starting PT. She has improved AROM, strength, stability, gait, balance, and exercise tolerance since starting PT. She does still have pains that limit her functional capacity. She does still have back and hip stiffness and tightness. She does still have core and endurance limitations. She would benefit from skilled PT care to address these issues over the next 2 months or so and re-assessment.     Objective findings and assessment details: See daily note 4/4/25    Goals:   Short Term Goals:   -pt meets MCID for RMQ (eval score 50) - NT/NOT MET  -pt walks >15 min on avg with mild local LBP - MET  -pt without issues doing chores <5 min >90% of the time - MET  -pt with indep with HEP >50% of the time - MET  Short term goal time span:  2-4 weeks      Long Term Goals:    -pt meets MCID for RMQ - NT/NOT MET  -pt walks >20 min on avg with mild local LBP to improve travel - NOT MET  -pt without issues doing chores <30 min >90% of the time - NOT MET  -pt with indep with HEP >90% of the time - NOT MET  -pt meets MCID of 2-3 reps for 30 sec STS (eval 7 reps) MET  Long term goal time span:  6-8 weeks     Plan:   Therapy options:  Physical therapy treatment to continue  Planned therapy interventions:  E Stim  Unattended (CPT 96299), E Stim Attended (CPT 92683), Manual Therapy (CPT 61357), Mechanical Traction (CPT 96873), Neuromuscular Re-education (CPT 79813), Hot or Cold Pack Therapy (CPT 92249), Therapeutic Activities (CPT 26077) and Therapeutic Exercise (CPT 92966)  Frequency:  2x week  Duration in weeks:  8  Duration in visits:  16  Discussed with:  Patient    Referring provider co-signature:  I have reviewed this plan of care and my co-signature certifies the need for services.     Certification Period: 04/04/2025 to 06/03/25    Physician Signature: ________________________________ Date: ______________

## 2025-04-04 NOTE — OP THERAPY DAILY TREATMENT
"Outpatient Physical Therapy  DAILY TREATMENT   [facility] Outpatient Physical Therapy [place] [id number] [facility], [address]  [place] [address]  Phone:  [phone]    Date: 04/04/2025  Patient: Jodi Lopez  YOB: 1955  MRN: [id number]   Time Calculation  Start time: 0245  Stop time: 0330 Time Calculation (min): 45 minutes   Chief Complaint: No chief complaint on file.  Visit #: 10    SUBJECTIVE:   Traveling is always tricky for me, not able to do the Hep as much. Gym equipment at [facility]"" wasn't something I was comfortable with. Back had its moments when it was bad. Was able to see my massage therapist in [place], did help. But when in [place] my back was really painful/stressed, did see a massage therapist there and helped it calm back down. Leaving this Monday for another 32 days for a cruise, but do want to come back when I return to town.     Sxs: 10/10 pain at worst, mild to moerate other times. P1 B back ache across both sides; P2 nauseated/achy pain up to middle      Aggs: bending over (P1), cleaning cabinets/chores/packing a suitcase (has to take breaks due to the pain), walking prolonged, sitting unsupported P1-2    OBJECTIVE:    Current objective measures:  30 sec STS 10 reps today vs 7 reps @ initial eval    AROM all WFL ROM  Flexion WFL  Extension slight central pinch  RSB WFL  LSB WFL     Hip Screen all WFL normal mobility no sxs  CYRIL  FADIR/Quadrant  Flexion  IR  ER     Hip strength   Extension R 4/5, L 4/5  Abduction R 4-/5, L 4-/5  ER 4+/5 ea     piriformis/soft tissues/P-As WFL no sxs in supine             Therapeutic Exercises (CPT 76373):     2. Nu step, 12', L5 seat 21    3. self traction over table, x1', NT    5. Brace with LE march + UE, x40\", \"dying bug\"; hard at 45 sec    6. bridging, 1x42\" H, 1x60\" H, ball at lower calves    7. sidelying hip abd, x15+ea, R faituges at 16; L 16    8. carries, 2x150', 15# for 1st lap, 10# for 2nd    9. prone hip extension over table, 2x10ea, " "increased R side stiffness/tightness vs L    10. shuttle leg press, 8B 30x    11. mini chop, x10, palloff x10, NT    12. seated lat pulls, 2x15    13. RDL stick, x10, 5# bar x10 (pulling L/S improves), NT      Therapeutic Exercise Summary: HEP  S/L hip ABD 3x10  Bridges DL 2x60\"H on ball  Brace with LE march 3x5  Hip ext over table  STS 10#    Time-based treatments/modalities:  Physical Therapy Timed Treatment Charges  Therapeutic exercise minutes (CPT 03645): 40 minutes    ASSESSMENT:   Returns to PT with no new or major issues after being away for the past month or so. Decreased Hep compliance has prevented further progressions, along with slight regressions in exercise performance seen in today's session.     Does continue to have good and bad days. Is leaving again for another month or so, further limiting progressions. Plans to return to PT once back in town. Can discuss with primary PT what the plan will be once returns.     PLAN/RECOMMENDATIONS:   Plan for treatment: therapy treatment to continue next visit.  Planned interventions for next visit: continue with current treatment.         "

## 2025-05-12 ENCOUNTER — OFFICE VISIT (OUTPATIENT)
Dept: MEDICAL GROUP | Facility: PHYSICIAN GROUP | Age: 70
End: 2025-05-12
Payer: MEDICARE

## 2025-05-12 VITALS
SYSTOLIC BLOOD PRESSURE: 122 MMHG | WEIGHT: 246.2 LBS | DIASTOLIC BLOOD PRESSURE: 70 MMHG | TEMPERATURE: 97.9 F | HEART RATE: 69 BPM | OXYGEN SATURATION: 94 % | BODY MASS INDEX: 42.03 KG/M2 | HEIGHT: 64 IN

## 2025-05-12 DIAGNOSIS — M43.16 SPONDYLOLISTHESIS OF LUMBAR REGION: ICD-10-CM

## 2025-05-12 DIAGNOSIS — M12.811 ROTATOR CUFF ARTHROPATHY OF RIGHT SHOULDER: ICD-10-CM

## 2025-05-12 DIAGNOSIS — M75.21 BICEPS TENDINITIS OF RIGHT UPPER EXTREMITY: ICD-10-CM

## 2025-05-12 DIAGNOSIS — Z76.89 ENCOUNTER FOR WEIGHT MANAGEMENT: ICD-10-CM

## 2025-05-12 DIAGNOSIS — R91.1 LUNG NODULE: ICD-10-CM

## 2025-05-12 PROCEDURE — 3074F SYST BP LT 130 MM HG: CPT | Performed by: FAMILY MEDICINE

## 2025-05-12 PROCEDURE — 99214 OFFICE O/P EST MOD 30 MIN: CPT | Performed by: FAMILY MEDICINE

## 2025-05-12 PROCEDURE — 3078F DIAST BP <80 MM HG: CPT | Performed by: FAMILY MEDICINE

## 2025-05-12 ASSESSMENT — FIBROSIS 4 INDEX: FIB4 SCORE: 1.36

## 2025-05-12 NOTE — OP THERAPY DAILY TREATMENT
"Outpatient Physical Therapy  DAILY TREATMENT   [facility] Outpatient Physical Therapy [address] [facility], [address]  [place] [address]  Phone:  [phone]    Date: 05/13/2025  Patient: Jodi Lopez  YOB: 1955  MRN: [id number]   Time Calculation  Start time: 0100  Stop time: 0145 Time Calculation (min): 45 minutes   Chief Complaint: No chief complaint on file.  Visit #: 11    SUBJECTIVE: Pt is wanting to switch her case to her shoulder. She indicates she saw MD who ordered PT for this.     Sxs: 10/10 pain at worst, mild to moerate other times. P1 B back ache across both sides; P2 nauseated/achy pain up to middle      Aggs: bending over (P1), cleaning cabinets/chores/packing a suitcase (has to take breaks due to the pain), walking prolonged, sitting unsupported P1-2    OBJECTIVE:    Current objective measures: none below:  30 sec STS 10 reps    none taken  Extension slight central pinch   ---> SI screens        Therapeutic Exercises (CPT 67128):     1. 1. UPOC 5/13/25    2. Nu step, 12', L5-6 seat 21    3. self traction over table, x1', NT    5. Brace with LE march + UE, x40\", \"dying bug\"; hard at 45 sec    6. bridging, 2x60\"H, ball at lower calves    7. sidelying hip abd, x15+ea, R faituges at 20; L 21    8. carries, 2x150', 15#--->    9. prone hip extension over table, x10ea; with opposite arm and leg    10. STS, 2x15, 10#; ball squats (today no shuttle 8B DL x30)    11. mini chop, x10, palloff x10, blckTB handles    12. seated lat pulls, 2x15, big purp TB handles    13. RDL stick, x10, 5# bar x10 (pulling L/S improves), x60\"H hard in legs per pt---->    14. ---> bent over rows?      Therapeutic Exercise Summary: HEP  S/L hip ABD 3x10  Bridges DL 2x60\"H on ball  Brace with LE march 3x5  Hip ext over table  STS 10#     Next---> RDL; bent over rows???       R ARCR 2 years ago at [facility] 5/4/23. She notes she had PT for about two months after her surgery. Popping that is non-painful    Sxs: R fernando " anterior shoulder/proximal bicep sxs; rare 1-2x/week sore/achy in dorsal forearm to wrist, denies N/T or neck pain      Aggs: stiff/pain HBB, occ reaching in front far    Obj:  Flex 90% sxs  ABD 95 deg  L3*, T10    PROM *+  Flex 90%    Mob deficits present mostly  ---> next STW assessment     Therapeutic Treatments and Modalities:     1. Manual Therapy (CPT 56854), STW paraspinals   Time-based treatments/modalities:  Physical Therapy Timed Treatment Charges  Manual therapy minutes (CPT 27847): 10 minutes  Therapeutic exercise minutes (CPT 49128): 35 minutes  ASSESSMENT: Will look at shoulder next time officially once permitted from [facility] team with her referral. Given basic edu here. Other than this the rest of her back based rehab seems about similar with exercises going well. Cont to progress here as well.    PLAN/RECOMMENDATIONS:   Plan for treatment: therapy treatment to continue next visit.  Planned interventions for next visit: continue with current treatment.

## 2025-05-12 NOTE — PROGRESS NOTES
Verbal consent was acquired by the patient to use [employer] ambient listening note generation during this visit.    Subjective:     HPI:   History of Present Illness  The patient presents for evaluation of pulmonary nodules, gastroesophageal reflux disease (GERD), right shoulder pain, and weight management.    She reports the presence of pulmonary nodules but has not had the opportunity to compare imaging studies to ascertain if there has been an increase in the number of nodules. The report suggests a potential increase. 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 She has been exposed to secondhand smoke but does not currently smoke herself.    She has been experiencing severe dyspepsia, which she attributes to her use of Celecoxib (Celebrex). Consequently, she discontinued the medication a few weeks ago. She also reports difficulty in digesting beef when consumed in the evening, leading her to consume it at lunch instead. She supplements her diet with digestive enzymes when consuming beef. She suspects a malfunctioning lower esophageal sphincter, as she experiences regurgitation within 1 to 1.5 hours of lying down, even with the use of a bed wedge. Her eating and sleeping schedule varies, with intervals ranging from 4 hours to less than 2 hours. She recalls an episode of severe epigastric pain and discomfort radiating outwards during a recent cruise, which led her to question if she had gallbladder or cardiac issues. She has been taking Famotidine (Pepcid) before sleep since then. She has been off Celecoxib since 04/26/2025 and has been managing her symptoms with Acetaminophen (Tylenol Arthritis), which she reports as being effective.    She is seeking to continue her physical therapy for her back and requests that her right shoulder be included in the treatment plan. She underwent rotator cuff surgery a few  "years ago and developed a lump postoperatively, which has since resolved. However, she continues to experience pain in the joint. She reports good range of motion but experiences crepitus at the top and mild pain during certain movements. She also reports weakness on the right side and pain during rotation. She does not recall receiving any injections into the shoulder recently. Her last physical therapy session for the shoulder was following her third surgery.    She expresses a desire to lose weight without resorting to medication and has been monitoring her sodium intake. She gained approximately 5 pounds during her recent 32-day cruise, despite consuming salads and fruits. She also consumed bell peppers, radishes, green mussels, filet mignon every other day, scallops, and shrimp during the cruise.    She is not quite at the 10-year tanisha for colon cancer screening.    PAST SURGICAL HISTORY:  - Rotator cuff surgery a few years ago    SOCIAL HISTORY  She does not smoke but is exposed to secondhand smoke.    Health Maintenance: Completed    Objective:     Exam:  /70 (BP Location: Left arm, Patient Position: Sitting, BP Cuff Size: Adult)   Pulse 69   Temp 36.6 °C (97.9 °F) (Temporal)   Ht 1.626 m (5' 4\")   Wt 112 kg (246 lb 3.2 oz)   SpO2 94%   BMI 42.26 kg/m²  Body mass index is 42.26 kg/m².    Physical Exam  Vitals reviewed.   Constitutional:       Appearance: Normal appearance.   HENT:      Head: Normocephalic and atraumatic.      Right Ear: External ear normal.      Left Ear: External ear normal.      Nose: Nose normal.   Cardiovascular:      Rate and Rhythm: Normal rate and regular rhythm.      Pulses: Normal pulses.      Heart sounds: Normal heart sounds. No murmur heard.  Pulmonary:      Effort: Pulmonary effort is normal. No respiratory distress.      Breath sounds: Normal breath sounds. No wheezing.   Abdominal:      General: Abdomen is flat.      Palpations: Abdomen is soft.   Skin:     General: " Skin is warm and dry.   Neurological:      Mental Status: She is alert and oriented to person, place, and time.   Psychiatric:         Mood and Affect: Mood normal.         Behavior: Behavior normal.       General: The patient is awake, alert, oriented. Dressed appropriately with good eye contact. No acute distress.   Examination of the RIGHT shoulder: Tenderness to palpation of the proximal biceps and anterolatreal shoulder. 140 degrees forward flexion. 35 degrees external rotation. S1 internal rotation. 5/5 Strength of the supraspinatus. 4/5 strength of the infraspinatus. 4/5 strength of the subscapularis. + Speeds. - Dias. - Bear Hug. - Belly Press. - [place]'s.  Sensation is grossly intact.  2+ radial pulse.   Elbow: No gross tenderness to the medial or lateral epicondyl or radial head. No obvious deformity of the elbow.   Skin: No warmth, redness, heat or rashes.    Weakness noted with internal and external rotation against resistance.      Results  Imaging   - CT scan of the lungs: Lung nodules present    Assessment & Plan:     1. Lung nodule  CT-CHEST (THORAX) W/O      2. Biceps tendinitis of right upper extremity  Referral to Physical Therapy      3. Spondylolisthesis of lumbar region  Referral to Physical Therapy      4. Rotator cuff arthropathy of right shoulder  Referral to Physical Therapy      5. Encounter for weight management  Referral to Nutrition Services          Assessment & Plan  1. Pulmonary nodules.  - The nodules are likely benign, given their small size and the absence of any underlying lung disease.  - The rest of the lung appears healthy.  - A follow-up imaging study will be ordered for next month to monitor the size of the nodules.  - Discussed that nodules are often incidental findings and can be due to inhalation of substances, but not from aspiration.    2. Gastroesophageal reflux disease (GERD).  - Symptoms suggest GERD, which may be causing epigastric pain or chest discomfort.  -  Advised to maintain a minimum of 2 hours between eating and sleeping, and to engage in light physical activity post-dinner to aid digestion.  - Avoid foods that exacerbate symptoms, such as tomato sauces, spicy foods, alcohol, caffeine, and citrus juices.  - Over-the-counter Pepcid was recommended for use as needed before bedtime, particularly after late dinners or consumption of trigger foods. If symptoms persist, take Pepcid nightly for a few weeks to identify potential triggers.    3. Right shoulder pain.  - The bicep tendon appears to be the source of the pain, with some associated weakness in the internal and external rotators.  - Physical exam findings include pain with certain movements and weakness in internal and external rotation.  - Continue physical therapy for the back and initiate therapy for the shoulder, focusing on the bicep tendon and cuff muscles.  - No recent injections into the shoulder; last physical therapy was post-surgery a couple of years ago.    4. Weight management.  - Advised to incorporate lemon water or apple cider vinegar into the diet, and to avoid breads, pastas, and potatoes.  - Discussed the potential benefits of non-citrus options like cucumber water.  - A referral to a nutritionist was provided for further dietary guidance.  - Encouraged to reduce carbohydrate intake and consider non-medication weight loss strategies.    5. Health maintenance.  - She is not quite at the 10-year tanisha for colon cancer screening.  - Digestive health team is monitoring her screening schedule.          Return in about 3 months (around 8/12/2025) for Med check.    Please note that this dictation was created using voice recognition software. I have made every reasonable attempt to correct obvious errors, but I expect that there are errors of grammar and possibly content that I did not discover before finalizing the note.    Larisa Eason MD  Family Medicine and Non - Operative Sports Medicine    [facility]

## 2025-05-13 ENCOUNTER — PHYSICAL THERAPY (OUTPATIENT)
Dept: PHYSICAL THERAPY | Facility: REHABILITATION | Age: 70
End: 2025-05-13
Attending: FAMILY MEDICINE
Payer: MEDICARE

## 2025-05-13 DIAGNOSIS — M43.16 SPONDYLOLISTHESIS OF LUMBAR REGION: ICD-10-CM

## 2025-05-13 PROCEDURE — 97140 MANUAL THERAPY 1/> REGIONS: CPT

## 2025-05-13 PROCEDURE — 97110 THERAPEUTIC EXERCISES: CPT

## 2025-05-13 NOTE — Clinical Note
REFERRAL APPROVAL NOTICE         Sent on May 13, 2025                   Jodi Lopez  [address] [address]                   Dear Ms. Lopez,    After a careful review of the medical information and benefit coverage, [facility] has processed your referral. See below for additional details.    If applicable, you must be actively enrolled with your insurance for coverage of the authorized service. If you have any questions regarding your coverage, please contact your insurance directly.    REFERRAL INFORMATION   Referral #:  [id number]  Referred-To Department    Referred-By Provider:  Physical Therapy    Larisa Eason M.D.   Phys Therapy [facility]      [address]  [facility] [address] [phone] [address], [address]  [facility] [address]  [phone]    Referral Start Date:  05/12/2025  Referral End Date:   05/12/2026             SCHEDULING  If you do not already have an appointment, please call [phone] to make an appointment.     MORE INFORMATION  If you do not already have a [facility] [employer] account, sign up at: [email or web address]  You can access your medical information, make appointments, see lab results, billing information, and more.  If you have questions regarding this referral, please contact  the [facility] Referrals department at:             [phone]. Monday - Friday 8:00AM - 5:00PM.     Sincerely,    [facility]

## 2025-05-14 ENCOUNTER — TELEPHONE (OUTPATIENT)
Dept: PHYSICAL THERAPY | Facility: REHABILITATION | Age: 70
End: 2025-05-14
Payer: MEDICARE

## 2025-05-14 NOTE — OP THERAPY DAILY TREATMENT
"Outpatient Physical Therapy  DAILY TREATMENT   [facility] [address] [facility], [address]  [place] [address]  Phone:  [phone]    Date: 05/15/2025  Patient: Jodi Lopez  YOB: 1955  MRN: [id number]   Time Calculation           Chief Complaint: No chief complaint on file.  Visit #: 12    SUBJECTIVE:***eval?/PN?*** Pt is wanting to switch her case to her shoulder. She indicates she saw MD who ordered PT for this.     Sxs: 10/10 pain at worst, mild to moerate other times. P1 B back ache across both sides; P2 nauseated/achy pain up to middle      Aggs: bending over (P1), cleaning cabinets/chores/packing a suitcase (has to take breaks due to the pain), walking prolonged, sitting unsupported P1-2    OBJECTIVE:  ***  Current objective measures: none below:  30 sec STS 10 reps    none taken  Extension slight central pinch   ---> SI screens        Therapeutic Exercises (CPT 99888):     1. 1. UPOC 5/13/25    2. Nu step, 12', L5-6 seat 21    3. self traction over table, x1', NT    5. Brace with LE march + UE, x40\", \"dying bug\"; hard at 45 sec    6. bridging, 2x60\"H, ball at lower calves    7. sidelying hip abd, x15+ea, R faituges at 20; L 21    8. carries, 2x150', 15#--->    9. prone hip extension over table, x10ea; with opposite arm and leg    10. STS, 2x15, 10#; ball squats (today no shuttle 8B DL x30)    11. mini chop, x10, palloff x10, blckTB handles    12. seated lat pulls, 2x15, big purp TB handles    13. RDL stick, x10, 5# bar x10 (pulling L/S improves), x60\"H hard in legs per pt---->    14. ---> bent over rows?      Therapeutic Exercise Summary: HEP  S/L hip ABD 3x10  Bridges DL 2x60\"H on ball  Brace with LE march 3x5  Hip ext over table  STS 10#     Next---> RDL; bent over rows???       R ARCR 2 years ago at [facility] 5/4/23. She notes she had PT for about two months after her surgery. Popping that is non-painful    Sxs: R sharp anterior shoulder/proximal bicep sxs; rare " 1-2x/week sore/achy in dorsal forearm to wrist, denies N/T or neck pain      Aggs: stiff/pain HBB, occ reaching in front far    Obj:  Flex 90% sxs  ABD 95 deg  L3*, T10    PROM *+  Flex 90%    Mob deficits present mostly  ---> next STW assessment     Therapeutic Treatments and Modalities:     1. Manual Therapy (CPT 50023), STW paraspinals   Time-based treatments/modalities:     ASSESSMENT: *** Will look at shoulder next time officially once permitted from [facility] auth team with her referral. Given basic edu here. Other than this the rest of her back based rehab seems about similar with exercises going well. Cont to progress here as well.    PLAN/RECOMMENDATIONS:   Plan for treatment: therapy treatment to continue next visit.  Planned interventions for next visit: continue with current treatment.

## 2025-05-14 NOTE — OP THERAPY EVALUATION
Outpatient Physical Therapy  INITIAL EVALUATION    [facility] [place]  [address], [address]  [place] [address]  Phone:  [phone]    Date of Evaluation: 05/15/2025    Patient: Jodi Lopez  YOB: 1955  MRN: [id number]     Referring Provider: Larisa Eason M.D.  [address]  [place] [address]   Referring Diagnosis Bicipital tendinitis, right shoulder [M75.21];Other specific arthropathies, not elsewhere classified, right shoulder [M12.811];Spondylolisthesis, lumbar region [M43.16]     Time Calculation  Start time: 0230  Stop time: 0315 Time Calculation (min): 45 minutes         Chief Complaint: No chief complaint on file.    Visit Diagnoses     ICD-10-CM   1. Biceps tendinitis of right upper extremity  M75.21   2. Spondylolisthesis of lumbar region  M43.16   3. Rotator cuff arthropathy of right shoulder  M12.811       Date of onset of impairment: 5/15/2022    Subjective:   History of Present Illness:     Mechanism of injury:  R ARCR 2 years ago at [facility] 5/4/23. She notes she had PT for about two months after her surgery. Popping that is non-painful is reported. She wonders if she has a biceps strain or something more. She has been having issues reaching and pain. Her pain from her surgery didn't ever go away and feels this didn't resolve her issues. She was cleared by the surgeon and did a couple months of PT per her report.      Sxs: R sharp anterior shoulder/proximal bicep sxs; rare 1-2x/week sore/achy in dorsal forearm to wrist, denies N/T or neck pain     Aggs: stiff/pain HBB, occ reaching in front far    Lower back: She cont to complain of issues here; the injection she had helped quite a bit here. She is still doing some of her HEP but would like to continue PT here for this region as well as her shoulder.    Sxs: 10//10 pain at worst, mild to moerate other times. P1 B back ache across both sides; P2 nauseated/achy pain up to middle      Aggs:  "bending over (P1), cleaning cabinets/chores/packing a suitcase (has to take breaks due to the pain), walking prolonged, sitting unsupported P1-2           Past Medical History[1]  Past Surgical History[2]  Social History     Tobacco Use   • Smoking status: Never     Passive exposure: Yes   • Smokeless tobacco: Never   Substance Use Topics   • Alcohol use: Yes     Comment: 1-2 per week     Social Hx - Family and Occupational[3]    Objective     General Comments     Spine Comments   30 sec STS 10 reps      AROM all WFL ROM  Flexion WFL  Extension slight central pinch  RSB WFL  LSB WFL     Hip Screen all WFL normal mobility no sxs  CYRIL  FADIR/Quadrant  Flexion  IR  ER     Hip strength   Extension R 4/5, L 4/5  Abduction R 4-/5, L 4-/5  ER 4+/5 ea     piriformis/soft tissues/P-As WFL no sxs in supine      Shoulder Comments   Obj:  Flex 90% sxs  ABD 95 deg  L3*, T10     PROM R/L  *+, WFL  Flex 90%, WFL  ER* 50%, WFL  IR*, WFL    MMT R/L  All 5/5 at shoulder except  4+/5* ABD R  5/5* IR R    Biceps* with reaching slihgt sxs here     TTP infra/teres insertions          Therapeutic Exercises (CPT 32724):     1. UPOC 6/15/25      Therapeutic Exercise Summary: HEP (performed in clinic)  Biceps isometrics wall 3x30\"H 25% effort  ER/abd OTB 2x1'H  Self post cuff release x1'  Wand flexion x10  Wand extension x10    Also today in clinic iso ER/IR 10x5\"H with btb  Rows x20 bigpurptb    Therapeutic Treatments and Modalities:     1. Manual Therapy (CPT 48464), GII+/III GHJ mobs, STW pec, and IASTM to post cuff, dec sxs ith AROM flexion    Time-based treatments/modalities:    Physical Therapy Timed Treatment Charges  Manual therapy minutes (CPT 85534): 15 minutes  Therapeutic exercise minutes (CPT 59333): 15 minutes      Assessment, Response and Plan:   Impairments: impaired functional mobility, lacks appropriate home exercise program and pain with function    Assessment details:  PT finds s/sx consistent with GHJ pain and " mobility deficits. This is evident in decreased and painful A and PROM in multiple planes, subjective description of symptoms, and Pmhx. She can benefit from skilled PT care to address this along with continued care for her lower back of which we have carried over some of her information here and will continue to formally look at (was being seen here prior to her vacation for low back pain).   Goals:   Short Term Goals:   -pt meets MCID for QUICKDASH  -pt reaches slightly behind back to lumbar spine in order to adjust belts/clothing with mild pain  -pt with ability to reach OH into cabinets with minor sxs at most to improve IADLs    -pt meets MCID for RMQ (eval score 50) - NT/NOT MET  -pt walks >15 min on avg with mild local LBP - MET  -pt without issues doing chores <5 min >90% of the time - MET  -pt with indep with HEP >50% of the time - MET  Short term goal time span:  2-4 weeks      Long Term Goals:    -pt meets MCID for QUICKDASH  -pt reaches slightly behind back to mid/high lumbar spine in order to adjust belts/clothing with mild pain  -pt with ability to reach OH into cabinets without sxs at most to improve IADLs  -pt without pain reaching/nadira bicep region OH to improve ability to reach into cabinets with less pain/etc    -pt meets MCID for RMQ - NT/NOT MET  -pt walks >20 min on avg with mild local LBP to improve travel - NOT MET  -pt without issues doing chores <30 min >90% of the time - NOT MET  -pt with indep with HEP >90% of the time - NOT MET  -pt meets MCID of 2-3 reps for 30 sec STS (eval 7 reps) MET    Long term goal time span:  6-8 weeks    Plan:   Therapy options:  Physical therapy treatment to continue  Planned therapy interventions:  E Stim Attended (CPT 60213), E Stim Unattended (CPT 46808), Hot or Cold Pack Therapy (CPT 22597), Manual Therapy (CPT 34170), Neuromuscular Re-education (CPT 91667), Therapeutic Exercise (CPT 38035) and Therapeutic Activities (CPT 12079)  Frequency:  2x  week  Duration in weeks:  8  Duration in visits:  16  Discussed with:  Patient      Functional Assessment Used        Referring provider co-signature:  I have reviewed this plan of care and my co-signature certifies the need for services.    Certification Period: 05/15/2025 to  07/17/25    Physician Signature: ________________________________ Date: ______________                        [1]  Past Medical History:  Diagnosis Date   • Anesthesia     slow to wake, takes several days to clear, fentanly slow wake up   • Arrhythmia    • Arthritis    • Blood clotting disorder (HCC) 1981    leg   • Cataract     bilateral IOL   • Chronic back pain    • Cold sore 8/1/2016   • Heart burn    • Listeria meningitis 1985   • NEGATIVE HISTORY OF    • Pain     wrist   • Pain     right knee   • Pins and needles sensation     top of head  relieved with  tums   • Renal disorder 2016    large left kidney cyst   • Snoring    • TIA (transient ischemic attack) 10/2015    possible   • TIA (transient ischemic attack) 2015    possible?    [2]  Past Surgical History:  Procedure Laterality Date   • PB SHLDR ARTHROSCOP,SURG,W/ROTAT CUFF REPB Right 5/4/2023    Procedure: Right shoulder arthroscopic rotator cuff repair, subacromial decompression, distal clavicle, excision, labral debridement;  Surgeon: Devante Lemon M.D.;  Location: [facility];  Service: Orthopedics   • PB KNEE SCOPE,DIAGNOSTIC Right 6/4/2020    Procedure: ARTHROSCOPY, KNEE;  Surgeon: Valentín Draper M.D.;  Location: [facility];  Service: Orthopedics   • MENISCECTOMY, KNEE, MEDIAL Right 6/4/2020    Procedure: MENISCECTOMY, KNEE, MEDIAL,LATERAL;  Surgeon: Valentín Draper M.D.;  Location: [facility];  Service: Orthopedics   • ORIF, WRIST Right 10/9/2019    Procedure: ORIF, WRIST- DISTAL RADIUS;  Surgeon: Kwabena Michelle M.D.;  Location: [facility];  Service: Orthopedics   • LIGAMENT REPAIR Right 10/9/2019    Procedure: REPAIR,  LIGAMENT- SCAPHOLUNATE;  Surgeon: Kwabena Michelle M.D.;  Location: [facility];  Service: Orthopedics   • HEMORRHOIDECTOMY  8/5/2016    Procedure: HEMORRHOIDECTOMY SINGLE QUADRANT;  Surgeon: Alpesh Gregory M.D.;  Location: SUR[facility]ERY [facility];  Service:    • OTHER  2015    varicose vein right leg   • OTHER  2015    cataracts bilateral   • OTHER  2010    breast biopsy   • BREAST BIOPSY     • TONSILLECTOMY     • TUBAL LIGATION     [3]  Family and Occupational History  Socioeconomic History   • Marital status:      Spouse name: Not on file   • Number of children: Not on file   • Years of education: Not on file   • Highest education level: Associate degree: occupational, technical, or vocational program   Occupational History   • Occupation:

## 2025-05-15 ENCOUNTER — PHYSICAL THERAPY (OUTPATIENT)
Dept: PHYSICAL THERAPY | Facility: REHABILITATION | Age: 70
End: 2025-05-15
Attending: FAMILY MEDICINE
Payer: MEDICARE

## 2025-05-15 DIAGNOSIS — M43.16 SPONDYLOLISTHESIS OF LUMBAR REGION: ICD-10-CM

## 2025-05-15 DIAGNOSIS — M12.811 ROTATOR CUFF ARTHROPATHY OF RIGHT SHOULDER: ICD-10-CM

## 2025-05-15 DIAGNOSIS — M75.21 BICEPS TENDINITIS OF RIGHT UPPER EXTREMITY: Primary | ICD-10-CM

## 2025-05-15 PROCEDURE — 97110 THERAPEUTIC EXERCISES: CPT

## 2025-05-15 PROCEDURE — 97140 MANUAL THERAPY 1/> REGIONS: CPT

## 2025-05-15 PROCEDURE — 97163 PT EVAL HIGH COMPLEX 45 MIN: CPT

## 2025-05-16 NOTE — OP THERAPY DAILY TREATMENT
"Outpatient Physical Therapy  DAILY TREATMENT   [facility] Outpatient Physical Therapy [place] [id number] [facility], [address]  [place] [address]  Phone:  [phone]  Date: 05/19/2025  Patient: Jodi Lopez  YOB: 1955  MRN: [id number]   Time Calculation  Start time: 0230  Stop time: 0315 Time Calculation (min): 45 minutes   Chief Complaint: No chief complaint on file.  Visit #: 13    SUBJECTIVE:  Maybe feeling a little better since first visit for shoulder. Hard to tell. Notes less sxs maybe reaching forward but still with twisting.   Sxs: R sharp anterior shoulder/proximal bicep sxs; rare 1-2x/week sore/achy in dorsal forearm to wrist, denies N/T or neck pain     Aggs: stiff/pain HBB, occ reaching in front far, twisting arm, steering wheel      OBJECTIVE:  Current objective measures:   ABD 95 deg*    NT below:  Biceps* with reaching slihgt sxs here              Therapeutic Exercises (CPT 65958):     1. 1. UPOC 6/15/25    2. Biceps isometrics wall, 50% effort 2x30\"H    3. rows, 2x20, bigpurpTB    4. ER/abd pinkTB doubled, 2x10    5. scap squeezes, x10x5\"H, 5#DBs    11. Post cuff sleeper stretch on wall, 2x30\"H    12. wand flexion, x15, supine    13. wand extension, x10ea      Therapeutic Exercise Summary: 1. UPOC 6/15/25      Therapeutic Exercise Summary: HEP (performed in clinic)  Biceps isometrics wall 3x30\"H 50% effort  Rows 2x20 purpTB  ER/abd OTB 2x10  Self post cuff release x1'  Wand flexion x10  Post cuff sleeper stretch on wall 2x30\"H     Also today in clinic iso ER/IR 10x5\"H with btb  Rows x20 bigpurptb         Therapeutic Treatments and Modalities:     1. Manual Therapy (CPT 61059), post cuff release; GHJ mobs GIII     Time-based treatments/modalities:    Physical Therapy Timed Treatment Charges  Manual therapy minutes (CPT 30075): 15 minutes  Therapeutic exercise minutes (CPT 46491): 25 minutes  ASSESSMENT:   Response to treatment: S/Sx linear to eval. Updated HEP for cont progressions of " mobility and strength.    PLAN/RECOMMENDATIONS:   Plan for treatment: therapy treatment to continue next visit.  Planned interventions for next visit: continue with current treatment.

## 2025-05-16 NOTE — Clinical Note
REFERRAL APPROVAL NOTICE         Sent on May 16, 2025                   Jodi Lopez  [address] [place] [id number]                   Dear Ms. Lopez,    After a careful review of the medical information and benefit coverage, [facility] has processed your referral. See below for additional details.    If applicable, you must be actively enrolled with your insurance for coverage of the authorized service. If you have any questions regarding your coverage, please contact your insurance directly.    REFERRAL INFORMATION   Referral #:  [id number]  Referred-To Department    Referred-By Provider:  Lara Eason M.D.   [facility]      [address]  [facility] [id number] [phone] [address] [facility] [address] [phone]    Referral Start Date:  05/12/2025  Referral End Date:   05/12/2026             SCHEDULING  If you do not already have an appointment, please call [phone] to make an appointment.     MORE INFORMATION  If you do not already have a [facility] account, sign up at: [email or web address]  You can access your medical information, make appointments, see lab results, billing information, and more.  If you have questions regarding this referral, please contact  the [facility] Referrals department at:             [phone]. Monday - Friday 8:00AM - 5:00PM.     Sincerely,    [facility]

## 2025-05-19 ENCOUNTER — PHYSICAL THERAPY (OUTPATIENT)
Dept: PHYSICAL THERAPY | Facility: REHABILITATION | Age: 70
End: 2025-05-19
Attending: FAMILY MEDICINE
Payer: MEDICARE

## 2025-05-19 DIAGNOSIS — M12.811 ROTATOR CUFF ARTHROPATHY OF RIGHT SHOULDER: ICD-10-CM

## 2025-05-19 DIAGNOSIS — M43.16 SPONDYLOLISTHESIS OF LUMBAR REGION: ICD-10-CM

## 2025-05-19 DIAGNOSIS — M75.21 BICEPS TENDINITIS OF RIGHT UPPER EXTREMITY: Primary | ICD-10-CM

## 2025-05-19 PROCEDURE — 97110 THERAPEUTIC EXERCISES: CPT

## 2025-05-19 PROCEDURE — 97140 MANUAL THERAPY 1/> REGIONS: CPT

## 2025-05-19 NOTE — OP THERAPY DAILY TREATMENT
"Outpatient Physical Therapy  DAILY TREATMENT   [facility] Physical Therapy [place] [address] [address] [facility], [address]  [place] [address]  Phone:  [phone]  Date: 05/21/2025  Patient: Jodi Lopez  YOB: 1955  MRN: [id number]   Time Calculation  Start time: 0145  Stop time: 0230 Time Calculation (min): 45 minutes   Chief Complaint: No chief complaint on file.  Visit #: 14    SUBJECTIVE: Pt notes that her shoulder was sore after last PT session.      Sxs: R sharp anterior shoulder/proximal bicep sxs; rare 1-2x/week sore/achy in dorsal forearm to wrist, denies N/T or neck pain     Aggs: stiff/pain HBB, occ reaching in front far, twisting arm, steering wheel      OBJECTIVE:   Current objective measures:   ABD 95 deg*    NT below:  Biceps* with reaching slihgt sxs here          Therapeutic Exercises (CPT 08260):     1. 1. UPOC 6/15/25    2. Biceps isometrics wall, 50% effort 2x30\"H    3. rows, x30, bigpurpTB    4. ER/abd pinkTB doubled, 2x10    5. scap squeezes, x10x5\"H, 6#DBs    6. bicep curl, x20ea, 6#DB; 50% ROM on RUE only able d/t weakness    11. Post cuff sleeper stretch on wall, 2x30\"H, too painful so did HAC    12. wand flexion, x15, supine; skipped    13. wand extension, x10ea, skipped    16. bridging, 2x60\"H, ball at lower calves, 2x1'H, 2x5ea, ball    17. sidelying hip abd, x10 R, x11 L to fatigue      Therapeutic Exercise Summary: 1. UPOC 6/15/25      Therapeutic Exercise Summary: HEP (performed in clinic)  Biceps isometrics wall 3x30\"H 50% effort  Rows 2x20 purpTB  ER/abd OTB 2x10  Self post cuff release x1'  Wand flexion x10  Post cuff sleeper stretch on wall 2x30\"H     Also today in clinic iso ER/IR 10x5\"H with btb  Rows x20 bigpurptb         Therapeutic Treatments and Modalities:     1. Manual Therapy (CPT 94864), post cuff release; GHJ mobs GIII     Time-based treatments/modalities:    Physical Therapy Timed Treatment Charges  Manual therapy minutes (CPT 42834): 10 " minutes  Therapeutic exercise minutes (CPT 73195): 30 minutes  ASSESSMENT:   Cont to progress skilled PT care in regards to strength at the shoulder. Has sig weakness with a bicep curl compared to her LUE despite MMT score for elbow flexion being WFL. Cont to progress here as able. Added some Tx back for L/S and plan to progress posterolateral strength prior to more functional weight training again.    PLAN/RECOMMENDATIONS:   Plan for treatment: therapy treatment to continue next visit.  Planned interventions for next visit: continue with current treatment.

## 2025-05-21 ENCOUNTER — PHYSICAL THERAPY (OUTPATIENT)
Dept: PHYSICAL THERAPY | Facility: REHABILITATION | Age: 70
End: 2025-05-21
Attending: FAMILY MEDICINE
Payer: MEDICARE

## 2025-05-21 DIAGNOSIS — M75.21 BICEPS TENDINITIS OF RIGHT UPPER EXTREMITY: Primary | ICD-10-CM

## 2025-05-21 PROCEDURE — 97110 THERAPEUTIC EXERCISES: CPT

## 2025-05-21 PROCEDURE — 97140 MANUAL THERAPY 1/> REGIONS: CPT

## 2025-05-27 NOTE — OP THERAPY DAILY TREATMENT
"Outpatient Physical Therapy  DAILY TREATMENT   [facility] Outpatient Physical Therapy [place] [address] [facility], [address]  [place] [address]  Phone:  [phone]  Date: 05/28/2025  Patient: Jodi Lopez  YOB: 1955  MRN: [id number]   Time Calculation  Start time: 0145  Stop time: 0230 Time Calculation (min): 45 minutes   Chief Complaint: No chief complaint on file.  Visit #: 15    SUBJECTIVE: Pt notes she has some R sided pain in her lower back. She notes that she has been experiencing this during the upward phase of bridging. She notes stiffness in \"whole\" body in AM. Shoulder maybe feels minor improvement.      Sxs: R sharp anterior shoulder/proximal bicep sxs; rare 1-2x/week sore/achy in dorsal forearm to wrist, denies N/T or neck pain     Aggs: stiff/pain HBB, occ reaching in front far, twisting arm, steering wheel    OBJECTIVE:   Current objective measures:   RSB/LSB*  All other AROM WFL  Slight sxs with bridge*    AROM shoulder  ABD 95 deg*    NT below:  Biceps* with reaching slihgt sxs here        Therapeutic Exercises (CPT 45117):     1. 1. UPOC 6/15/25    2. Biceps isometrics wall, 70% effort 2x30\"H    3. rows, x30, bigpurpTB; skipped    4. ER/abd pinkTB doubled, 2x10    5. scap squeezes, x10x5\"H, 6#DBs    6. bicep curl, x20ea 3# on RUE, 6#DB; 50% ROM on RUE only able d/t weakness    7. scaption, sxs bicep mm    11. Post cuff sleeper stretch on wall, 2x30\"H, too painful so did HAC; NT    12. wand flexion, x15, supine; skipped    13. wand extension, x10ea, skipped    16. bridging, 2x60\"H, ball at lower calves, x5 ea, no ball today; skipped the rest due to time    17. sidelying hip abd, to fatigue, x15 R, x11 L to fatigue    18. LTR, x1'    19. open book, x15 ea, hand on ribs d/t R shoulder hx      Therapeutic Exercise Summary: 1. UPOC 6/15/25      Therapeutic Exercise Summary: HEP (performed in clinic)  Biceps isometrics wall 3x30\"H 50% effort  Rows 2x20 purpTB  ER/abd OTB 2x10  Self post " "cuff release x1'  Wand flexion x10  Post cuff sleeper stretch on wall 2x30\"H     Also today in clinic iso ER/IR 10x5\"H with btb  Rows x20 bigpurptb             Therapeutic Treatments and Modalities:     1. Manual Therapy (CPT 80896), GHJ mobs GIII; NT post cuff release;  Time-based treatments/modalities:  Physical Therapy Timed Treatment Charges  Manual therapy minutes (CPT 12735): 10 minutes  Therapeutic exercise minutes (CPT 66464): 30 minutes  ASSESSMENT: Pt with deficits still in reaching with proximal arm/shoulder pain and cont weakness with things like bicep curls. She is tolerating more volume of activity at her shoulder and we will cont to progress here. In addition, her lower back is more painful today with SB but all other ROM is WFL and no TTP at spinous processes or with percussion here. We were able to improve her sxs slightly with rotational stretching and added this to Hep the rest of this week.    PLAN/RECOMMENDATIONS:   Plan for treatment: therapy treatment to continue next visit.  Planned interventions for next visit: continue with current treatment.         "

## 2025-05-28 ENCOUNTER — PHYSICAL THERAPY (OUTPATIENT)
Dept: PHYSICAL THERAPY | Facility: REHABILITATION | Age: 70
End: 2025-05-28
Attending: FAMILY MEDICINE
Payer: MEDICARE

## 2025-05-28 DIAGNOSIS — M43.16 SPONDYLOLISTHESIS OF LUMBAR REGION: ICD-10-CM

## 2025-05-28 DIAGNOSIS — M12.811 ROTATOR CUFF ARTHROPATHY OF RIGHT SHOULDER: ICD-10-CM

## 2025-05-28 DIAGNOSIS — M75.21 BICEPS TENDINITIS OF RIGHT UPPER EXTREMITY: Primary | ICD-10-CM

## 2025-05-28 PROCEDURE — 97110 THERAPEUTIC EXERCISES: CPT

## 2025-05-28 PROCEDURE — 97140 MANUAL THERAPY 1/> REGIONS: CPT

## 2025-05-29 NOTE — OP THERAPY DAILY TREATMENT
"Outpatient Physical Therapy  DAILY TREATMENT   [facility] Outpatient Physical Therapy [place] [address] [facility], [address]  [place] [address]  Phone:  [phone]  Date: 05/30/2025  Patient: Jodi Lopez  YOB: 1955  MRN: [id number]   Time Calculation  Start time: 0930  Stop time: 1010 Time Calculation (min): 40 minutes   Chief Complaint: No chief complaint on file.  Visit #: 16    SUBJECTIVE: Pt notes that she is having R sided mid/upper lumbar pain still.    Sxs: R sharp anterior shoulder/proximal bicep sxs; rare 1-2x/week sore/achy in dorsal forearm to wrist, denies N/T or neck pain     Aggs: stiff/pain HBB, occ reaching in front far, twisting arm, steering wheel    OBJECTIVE:   Current objective measures:   RSB/LSB*  All other AROM WFL  Slight sxs with bridge*    AROM shoulder  ABD 95 deg*    NT below:  Biceps* with reaching slihgt sxs here        Therapeutic Exercises (CPT 38696):     1. 1. UPOC 6/15/25    2. Biceps isometrics wall, 70% effort 2x45\"H    3. rows/pull downs, x30ea, bigpurpTB    4. ER/abd pinkTB doubled, 2x10    5. scap squeezes, x10x5\"H, 6#DBs    6. bicep curl, x20ea 3# on RUE, NT;6#DB; 50% ROM on RUE only able d/t weakness    7. scaption, sxs bicep mm; NT    11. Post cuff sleeper stretch on wall, 2x30\"H, too painful so did HAC; NT    12. wand flexion, x15, supine; skipped    13. wand extension, x10ea, skipped    16. bridging, 2x60\"H, ball at lower calves, x5 ea, no ball today; skipped the rest due to time    17. sidelying hip abd, to fatigue, x17 R, x17 L to fatigue    18. LTR, x1'    19. open book, x15 ea, hand on ribs d/t R shoulder hx      Therapeutic Exercise Summary: 1. UPOC 6/15/25      Therapeutic Exercise Summary: HEP (performed in clinic)  Biceps isometrics wall 3x30\"H 50% effort  Pull downs 2x20 purpTB  Scap squeeze 2x10 5#  ER/abd OTB 2x10  Wand flexion x10  Wand extension x10    Bridges  Ltr  Sidelying hip abd             Therapeutic Treatments and Modalities:     1. " Manual Therapy (CPT 34912), KEVIN mobs GIII; NT post cuff release;  Time-based treatments/modalities:  Physical Therapy Timed Treatment Charges  Manual therapy minutes (CPT 18122): 15 minutes  Therapeutic exercise minutes (CPT 70361): 25 minutes  ASSESSMENT: Pt with cont pain reaching OH. L/S AROM pain with SB; this doesn't decrease today after manual. Will monitor. Cont skilled PT care.    PLAN/RECOMMENDATIONS:   Plan for treatment: therapy treatment to continue next visit.  Planned interventions for next visit: continue with current treatment.

## 2025-05-30 ENCOUNTER — PHYSICAL THERAPY (OUTPATIENT)
Dept: PHYSICAL THERAPY | Facility: REHABILITATION | Age: 70
End: 2025-05-30
Attending: FAMILY MEDICINE
Payer: MEDICARE

## 2025-05-30 DIAGNOSIS — M12.811 ROTATOR CUFF ARTHROPATHY OF RIGHT SHOULDER: ICD-10-CM

## 2025-05-30 DIAGNOSIS — M43.16 SPONDYLOLISTHESIS OF LUMBAR REGION: ICD-10-CM

## 2025-05-30 DIAGNOSIS — M75.21 BICEPS TENDINITIS OF RIGHT UPPER EXTREMITY: Primary | ICD-10-CM

## 2025-05-30 PROCEDURE — 97140 MANUAL THERAPY 1/> REGIONS: CPT

## 2025-05-30 PROCEDURE — 97110 THERAPEUTIC EXERCISES: CPT

## 2025-05-30 NOTE — OP THERAPY DAILY TREATMENT
"Outpatient Physical Therapy  DAILY TREATMENT   [facility] Outpatient Physical Therapy [place] [address] [facility], [address]  [place] [address]  Phone:  [phone]  Date: 06/02/2025  Patient: Jodi Lopez  YOB: 1955  MRN: [id number]   Time Calculation           Chief Complaint: No chief complaint on file.  Visit #: 17    SUBJECTIVE: Pt with reports of cont ant shoulder pain and back pain. Still sxs with a bridge.      Sxs: R sharp anterior shoulder/proximal bicep sxs; rare 1-2x/week sore/achy in dorsal forearm to wrist, denies N/T or neck pain     Aggs: stiff/pain HBB, occ reaching in front far, twisting arm, steering wheel    OBJECTIVE:   Current objective measures:   FIR glute; improves to L5/S1 LUE    RSB/LSB*  All other AROM WFL  Slight sxs with bridge*    AROM shoulder  ABD 95 deg*    NT below:  Biceps* with reaching slihgt sxs here        Therapeutic Exercises (CPT 52545):     1. 1. UPOC 6/15/25    2. Biceps isometrics wall, 70% effort 2x45\"H    3. rows/pull downs, x30ea, bigpurpTB    4. ER/abd pinkTB doubled, 2x10, NT    5. scap squeezes, x10x5\"H, 6#DBs; NT    6. bicep curl, 2x20ea 3# on RUE, NT;6#DB; 50% ROM on RUE only able d/t weakness    7. scaption, sxs bicep mm; NT    8. wall slides    11. HAC at wall, x15, too painful so did HAC; NT    12. wand flexion, x15, supine; skipped    13. wand extension, x10ea, skipped    14. HBB stretch, x15    15. standing hip ext, x20ea, over table    16. bridging, 2x60\"H, ball at lower calves, x5 ea, no ball today; skipped the rest due to time    17. sidelying hip abd, to fatigue, x20 ea    18. LTR, x1'    19. open book, x15 ea, hand on ribs d/t R shoulder hx; NT      Therapeutic Exercise Summary: 1. UPOC 6/15/25      Therapeutic Exercise Summary: HEP (performed in clinic)  Biceps isometrics wall 3x30\"H 50% effort  Pull downs 2x20 purpTB  Scap squeeze 2x10 5#  ER/abd OTB 2x10  Wand flexion x10  Wand extension x10    Bridges  Ltr  Sidelying hip " abd    Therapeutic Treatments and Modalities:     1. Manual Therapy (CPT 27335), GHJ mobs GIII; PROM sh IR  Time-based treatments/modalities:     ASSESSMENT: Pt cont with deficits in her ability to reach without anterior shoulder pain and mobility deficits with crepitus here. Cont to progress as tolerated. Stretching today slightly improves her ability to reach behind her back. Cont skilled PT care here as tolerated.    PLAN/RECOMMENDATIONS:   Plan for treatment: therapy treatment to continue next visit.  Planned interventions for next visit: continue with current treatment.

## 2025-06-02 ENCOUNTER — PHYSICAL THERAPY (OUTPATIENT)
Dept: PHYSICAL THERAPY | Facility: REHABILITATION | Age: 70
End: 2025-06-02
Attending: FAMILY MEDICINE
Payer: MEDICARE

## 2025-06-02 DIAGNOSIS — M43.16 SPONDYLOLISTHESIS OF LUMBAR REGION: ICD-10-CM

## 2025-06-02 DIAGNOSIS — M75.21 BICEPS TENDINITIS OF RIGHT UPPER EXTREMITY: Primary | ICD-10-CM

## 2025-06-02 DIAGNOSIS — M12.811 ROTATOR CUFF ARTHROPATHY OF RIGHT SHOULDER: ICD-10-CM

## 2025-06-02 PROCEDURE — 97110 THERAPEUTIC EXERCISES: CPT

## 2025-06-02 PROCEDURE — 97140 MANUAL THERAPY 1/> REGIONS: CPT

## 2025-06-03 ENCOUNTER — RESULTS FOLLOW-UP (OUTPATIENT)
Dept: MEDICAL GROUP | Facility: PHYSICIAN GROUP | Age: 70
End: 2025-06-03

## 2025-06-03 ENCOUNTER — HOSPITAL ENCOUNTER (OUTPATIENT)
Dept: RADIOLOGY | Facility: MEDICAL CENTER | Age: 70
End: 2025-06-03
Attending: FAMILY MEDICINE
Payer: MEDICARE

## 2025-06-03 DIAGNOSIS — R91.1 LUNG NODULE: ICD-10-CM

## 2025-06-03 PROCEDURE — 71250 CT THORAX DX C-: CPT

## 2025-06-05 ENCOUNTER — APPOINTMENT (OUTPATIENT)
Dept: PHYSICAL THERAPY | Facility: REHABILITATION | Age: 70
End: 2025-06-05
Attending: FAMILY MEDICINE
Payer: MEDICARE

## 2025-06-05 ENCOUNTER — APPOINTMENT (OUTPATIENT)
Dept: MEDICAL GROUP | Facility: PHYSICIAN GROUP | Age: 70
End: 2025-06-05
Payer: MEDICARE

## 2025-06-05 NOTE — OP THERAPY DAILY TREATMENT
"Outpatient Physical Therapy  DAILY TREATMENT   [facility] Outpatient Physical Therapy [place] [address] [facility], [address]  [place] [address]  Phone:  [phone]  Date: 06/09/2025  Patient: Jodi Lopez  YOB: 1955  MRN: [id number]   Time Calculation           Chief Complaint: No chief complaint on file.  Visit #: 18    SUBJECTIVE: Similar sxs; she notes that she cont to have some issues with her shoulder and back. Is having updated imaging after MD f/u today.      Sxs: R sharp anterior shoulder/proximal bicep sxs; rare 1-2x/week sore/achy in dorsal forearm to wrist, denies N/T or neck pain     Aggs: stiff/pain HBB, occ reaching in front far, twisting arm, steering wheel    OBJECTIVE:    Current objective measures:   FIR glute; improves to L5/S1 LUE    NT Below:  RSB/LSB*  All other AROM WFL  Slight sxs with bridge*    AROM shoulder  ABD 95 deg*    NT below:  Biceps* with reaching slihgt sxs here        Therapeutic Exercises (CPT 14383):     1. 1. UPOC 6/15/25    2. Biceps isometrics wall, 70% effort 2x45\"H    3. rows/pull downs, x30ea, bigpurpTB    4. ER/abd pinkTB doubled, 2x10, NT    5. scap squeezes, x10x5\"H, 6#DBs; NT    6. bicep curl, 2x20ea 3# on RUE, NT;6#DB; 50% ROM on RUE only able d/t weakness    7. scaption, sxs bicep mm; NT    8. wall slides, x10, sxs ant shoulder    11. HAC at wall, x15, too painful so did HAC; NT    12. wand flexion, x15, supine; skipped    13. wand extension, x10ea    14. HBB stretch, x15    15. standing hip ext, x20ea, over table    16. bridging, 2x60\"H, ball at lower calves, x5 ea, no ball today; skipped    17. sidelying hip abd, to fatigue, x20 ea    18. LTR, x1', NT    19. open book, x15 ea, hand on ribs d/t R shoulder hx; NT    20. Nu step, x6', seat 18/19      Therapeutic Exercise Summary: 1. UPOC 6/15/25      Therapeutic Exercise Summary: HEP (performed in clinic)  Biceps isometrics wall 3x30\"H 50% effort  Pull downs 2x20 purpTB  Scap squeeze 2x10 5#  ER/abd " OTB 2x10  Wand flexion x10  Wand extension x10    Bridges  Ltr  Sidelying hip abd    Therapeutic Treatments and Modalities:     1. Manual Therapy (CPT 28219), GHJ mobs GIII; PROM sh IR  Time-based treatments/modalities:     ASSESSMENT: cont pain reaching even with support; cont skilled PT care to improve load to anterior shoulder. Skipped working with her back mostly today due to time.    PLAN/RECOMMENDATIONS:   Plan for treatment: therapy treatment to continue next visit.  Planned interventions for next visit: continue with current treatment.

## 2025-06-09 ENCOUNTER — APPOINTMENT (OUTPATIENT)
Dept: RADIOLOGY | Facility: IMAGING CENTER | Age: 70
End: 2025-06-09
Attending: FAMILY MEDICINE
Payer: MEDICARE

## 2025-06-09 ENCOUNTER — PHYSICAL THERAPY (OUTPATIENT)
Dept: PHYSICAL THERAPY | Facility: REHABILITATION | Age: 70
End: 2025-06-09
Attending: FAMILY MEDICINE
Payer: MEDICARE

## 2025-06-09 ENCOUNTER — OFFICE VISIT (OUTPATIENT)
Dept: MEDICAL GROUP | Facility: PHYSICIAN GROUP | Age: 70
End: 2025-06-09
Payer: MEDICARE

## 2025-06-09 VITALS
HEIGHT: 64 IN | SYSTOLIC BLOOD PRESSURE: 144 MMHG | TEMPERATURE: 97.7 F | BODY MASS INDEX: 41.86 KG/M2 | OXYGEN SATURATION: 94 % | DIASTOLIC BLOOD PRESSURE: 80 MMHG | RESPIRATION RATE: 15 BRPM | WEIGHT: 245.2 LBS | HEART RATE: 80 BPM

## 2025-06-09 DIAGNOSIS — M12.811 ROTATOR CUFF ARTHROPATHY OF RIGHT SHOULDER: ICD-10-CM

## 2025-06-09 DIAGNOSIS — R91.1 LUNG NODULE: Primary | ICD-10-CM

## 2025-06-09 DIAGNOSIS — M75.21 BICEPS TENDINITIS OF RIGHT UPPER EXTREMITY: ICD-10-CM

## 2025-06-09 DIAGNOSIS — M43.16 SPONDYLOLISTHESIS OF LUMBAR REGION: ICD-10-CM

## 2025-06-09 DIAGNOSIS — M54.6 MIDLINE THORACIC BACK PAIN, UNSPECIFIED CHRONICITY: ICD-10-CM

## 2025-06-09 DIAGNOSIS — I10 ESSENTIAL HYPERTENSION: ICD-10-CM

## 2025-06-09 DIAGNOSIS — M75.21 BICEPS TENDINITIS OF RIGHT UPPER EXTREMITY: Primary | ICD-10-CM

## 2025-06-09 PROCEDURE — 99214 OFFICE O/P EST MOD 30 MIN: CPT | Performed by: FAMILY MEDICINE

## 2025-06-09 PROCEDURE — 97140 MANUAL THERAPY 1/> REGIONS: CPT

## 2025-06-09 PROCEDURE — 3079F DIAST BP 80-89 MM HG: CPT | Performed by: FAMILY MEDICINE

## 2025-06-09 PROCEDURE — 3077F SYST BP >= 140 MM HG: CPT | Performed by: FAMILY MEDICINE

## 2025-06-09 PROCEDURE — 73030 X-RAY EXAM OF SHOULDER: CPT | Mod: TC,RT | Performed by: RADIOLOGY

## 2025-06-09 PROCEDURE — 72070 X-RAY EXAM THORAC SPINE 2VWS: CPT | Mod: TC | Performed by: RADIOLOGY

## 2025-06-09 PROCEDURE — 97110 THERAPEUTIC EXERCISES: CPT

## 2025-06-09 ASSESSMENT — FIBROSIS 4 INDEX: FIB4 SCORE: 1.36

## 2025-06-09 ASSESSMENT — PAIN SCALES - GENERAL: PAINLEVEL_OUTOF10: 8=MODERATE-SEVERE PAIN

## 2025-06-10 ENCOUNTER — RESULTS FOLLOW-UP (OUTPATIENT)
Dept: MEDICAL GROUP | Facility: PHYSICIAN GROUP | Age: 70
End: 2025-06-10

## 2025-06-11 ENCOUNTER — PHYSICAL THERAPY (OUTPATIENT)
Dept: PHYSICAL THERAPY | Facility: REHABILITATION | Age: 70
End: 2025-06-11
Attending: FAMILY MEDICINE
Payer: MEDICARE

## 2025-06-11 DIAGNOSIS — M75.21 BICEPS TENDINITIS OF RIGHT UPPER EXTREMITY: Primary | ICD-10-CM

## 2025-06-11 DIAGNOSIS — M43.16 SPONDYLOLISTHESIS OF LUMBAR REGION: ICD-10-CM

## 2025-06-11 DIAGNOSIS — M12.811 ROTATOR CUFF ARTHROPATHY OF RIGHT SHOULDER: ICD-10-CM

## 2025-06-11 PROCEDURE — 97110 THERAPEUTIC EXERCISES: CPT

## 2025-06-11 NOTE — OP THERAPY DAILY TREATMENT
"Outpatient Physical Therapy  DAILY TREATMENT   [facility] Outpatient Physical Therapy [place] [address] [facility], [address]  [place] [address]  Phone:  [phone]  Date: 06/11/2025  Patient: Jodi Lopez  YOB: 1955  MRN: [id number]   Time Calculation           Chief Complaint: No chief complaint on file.  Visit #: 19    SUBJECTIVE: Pt with reports of her back pain still there but in a smaller radius. Her shoulder has been feeling about the same.      Sxs: R sharp anterior shoulder/proximal bicep sxs; rare 1-2x/week sore/achy in dorsal forearm to wrist, denies N/T or neck pain     Aggs: stiff/pain HBB, occ reaching in front far, twisting arm, steering wheel    OBJECTIVE:    Current objective measures:   FIR glute; improves to L5/S1 LUE    NT Below:  RSB/LSB*  All other AROM WFL  Slight sxs with bridge*    AROM shoulder  ABD 95 deg*    NT below:  Biceps* with reaching slihgt sxs here        Therapeutic Exercises (CPT 32112):     1. 1. UPOC 6/15/25    2. Biceps isometrics wall, 70% effort 2x45\"H    3. rows/pull downs, 2x20ea, bigpurpTB    4. ER/abd pinkTB doubled, 2x10, NT    5. scap squeezes, x10x5\"H, 6#DBs; NT    6. bicep curl, 2x20ea 3# on RUE, NT;6#DB; 50% ROM on RUE only able d/t weakness    7. scaption, sxs bicep mm; NT    8. wall slides, x10, sxs ant shoulder    9. sidelying shoulder flexion, x10, sxs ant shoulder    10. HBB stretch, x15, NT    11. HAC at wall, x15, too painful so did HAC; NT    12. wand flexion, x15, supine; skipped    13. wand extension, x10ea, NT    15. standing hip ext, x20ea, over table    16. bridging, x60\"H (today only able to hold x15\"H), ball at calves    17. sidelying hip abd, to fatigue, x20 ea    18. LTR, x1'    19. DKTC, x1'    20. Nu step, x6', seat 18/19      Therapeutic Exercise Summary: 1. UPOC 6/15/25      Therapeutic Exercise Summary: HEP (performed in clinic)  Biceps isometrics wall 3x30\"H 50% effort  Pull downs 2x20 purpTB  Scap squeeze 2x10 5#  ER/abd OTB " 2x10  Wand flexion x10  Wand extension x10    Bridges  Ltr  Sidelying hip abd    Therapeutic Treatments and Modalities:     1. Manual Therapy (CPT 56624), GHJ mobs GIII; PROM sh IR  Time-based treatments/modalities:     ASSESSMENT: cont pain with load to anterior shoulder; no changes yet here. In addition she did well with progressions for her lower back today without any significant issues.     PLAN/RECOMMENDATIONS:   Plan for treatment: therapy treatment to continue next visit.  Planned interventions for next visit: continue with current treatment.

## 2025-06-12 PROBLEM — R03.0 ELEVATED BLOOD PRESSURE READING WITHOUT DIAGNOSIS OF HYPERTENSION: Status: RESOLVED | Noted: 2022-03-07 | Resolved: 2025-06-12

## 2025-06-12 NOTE — PROGRESS NOTES
"Verbal consent was acquired by the patient to use [employer] ambient listening note generation during this visit     Subjective:     HPI:   History of Present Illness  The patient presents for evaluation of shoulder pain, back pain, and CT scan review.    R Shoulder Pain  - Underwent rotator cuff repair surgery a few years ago and believes she developed tendinitis from using the bolster provided post-surgery.  - Despite applying ice, using Voltaren, and undergoing physical therapy, the tendinitis has improved but persists.  - Her therapist suggested the possibility of an injection.  - Experiences significant discomfort during shoulder rotation.  - Has been receiving massages, which have improved her condition compared to a few years ago.  - Does not believe she has re-injured her shoulder.  - Has an upcoming appointment with Dr. Eason in 08/2025.    Mid-Back Pain  - Experiences a pinching sensation when performing bridges, which she has noticed more since starting PT again  - Concerned about potential disc collapse.  - Reports a pinching sensation when sitting.  - Has a history of compression fractures at T11 and T12.  - Previously received an epidural injection for neurological pain and spine issues.    CT Scan Review  - Had a CT scan done recently, which showed multiple noncalcified nodules.  - The radiologist noted that these nodules are stable and likely postinflammatory, with no recommendation for follow-up.  - The CT scan indicated a small hernia of the stomach.          Objective:     Exam:  BP (!) 144/80 (BP Location: Right arm, Patient Position: Sitting, BP Cuff Size: Adult)   Pulse 80   Temp 36.5 °C (97.7 °F) (Temporal)   Resp 15   Ht 1.626 m (5' 4\")   Wt 111 kg (245 lb 3.2 oz)   SpO2 94%   BMI 42.09 kg/m²  Body mass index is 42.09 kg/m².    Physical Exam  Constitutional:       Appearance: Normal appearance.   HENT:      Head: Normocephalic and atraumatic.      Nose: Nose normal.      " Mouth/Throat:      Mouth: Mucous membranes are moist.   Eyes:      Extraocular Movements: Extraocular movements intact.      Conjunctiva/sclera: Conjunctivae normal.      Pupils: Pupils are equal, round, and reactive to light.   Cardiovascular:      Rate and Rhythm: Normal rate and regular rhythm.      Heart sounds: No murmur heard.  Pulmonary:      Effort: Pulmonary effort is normal.      Breath sounds: Normal breath sounds.   Abdominal:      General: Abdomen is flat. Bowel sounds are normal.   Skin:     General: Skin is warm and dry.   Neurological:      General: No focal deficit present.      Mental Status: She is alert and oriented to person, place, and time.   Psychiatric:         Mood and Affect: Mood normal.             Results  - Imaging:    - CT scan:      1.  Stable small noncalcified pulmonary nodules measuring up to 4 mm.       2.  No significant new abnormality.       3.  Small to moderate hiatal hernia.    Assessment & Plan:     1. Lung nodule        2. Biceps tendinitis of right upper extremity  DX-SHOULDER 2+ RIGHT      3. Midline thoracic back pain, unspecified chronicity  DX-THORACIC SPINE-2 VIEWS      4. Essential hypertension        5. Rotator cuff arthropathy of right shoulder            Assessment & Plan  R Shoulder pain:  - Chronic  - Persistent pain following rotator cuff repair surgery and subsequent tendinitis  - Improvement noted with ice, Voltaren, and physical therapy, but discomfort persists during rotation  - Physical therapy has provided some relief, but tightness and pain continue  - Need for updated imaging discussed before considering injection therapy  - Referral to Dr. Eason for further evaluation and potential injection therapy  - X-rays to be ordered to assess the current state of the shoulder  - Patient to continue PT    Thoracic back pain:  - Progressing  - Pinching sensation during certain movements, potentially related to scoliosis and previous compression fractures at  T11 and T12  - Physical exam findings suggest tightness and discomfort in the mid-back region  - Pain possibly related to curvature of the spine discussed  - X-ray to be ordered to evaluate the spine  - Further treatment options, including injections, may be considered depending on x-ray results    Lung nodules:  - Recent CT scan shows stable noncalcified nodules, likely postinflammatory, largest measuring 4 mm  - Radiologist did not recommend follow-up for the nodules, indicating stability and no significant changes  - Findings discussed with the patient, explaining the likely postinflammatory nature of the nodules and lack of concern from the radiologist  - No further work up at this time is indicated    HTN:  - Chronic  - Uncontrolled in office today  - Is not currently on any medication and has previously been diet controlled at her last several appointments  - Patient to monitor and can start medication as needed            No follow-ups on file.    Please note that this dictation was created using voice recognition software. I have made every reasonable attempt to correct obvious errors, but I expect that there are errors of grammar and possibly content that I did not discover before finalizing the note.

## 2025-06-16 NOTE — OP THERAPY DAILY TREATMENT
"Outpatient Physical Therapy  DAILY TREATMENT   [facility] Outpatient Physical Therapy [place] [address] [employer] [facility], [address]  [place] [address]  Phone:  [phone]  Date: 06/17/2025  Patient: Jodi Lopez  YOB: 1955  MRN: [id number]   Time Calculation  Start time: 1000  Stop time: 1045 Time Calculation (min): 45 minutes   Chief Complaint: No chief complaint on file.  Visit #: 20    SUBJECTIVE:  PN Pt notes that she has slight improvements overall in mobility and pain. She notes that      Sxs: R sharp anterior shoulder/proximal bicep sxs; rare 1-2x/week sore/achy in dorsal forearm to wrist, denies N/T or neck pain     Aggs: stiff/pain HBB, occ reaching in front far, twisting arm, steering wheel    OBJECTIVE:    Current objective measures:   AROM shoulder  ABD 95 deg*  Flexion 105*  FIR glute; improves to L5/S1 LUE    L/S AROM  RSB/LSB slight sxs locally  All other AROM WFL  Slight sxs with bridge*      NT below:  Biceps* with reaching slihgt sxs here        Therapeutic Exercises (CPT 22010):     1. 1. UPOC 6/15/25    2. Biceps isometrics wall, 70% effort 2x45\"H    3. rows/pull downs, 2x20ea, bigpurpTB    4. ER/abd pinkTB doubled, 2x10, NT    5. scap squeezes, x10x5\"H, 6#DBs; NT    6. bicep curl, 2x20ea 3# on RUE, NT;6#DB; 50% ROM on RUE only able d/t weakness    7. scaption, sxs bicep mm; NT    8. wall slides, x10, sxs ant shoulder    9. sidelying shoulder flexion, x10, sxs ant shoulder    10. HBB stretch, x15, NT    11. HAC at wall, x15, too painful so did HAC; NT    12. wand flexion, x15, supine; skipped    13. wand extension, x10ea, NT    14. bent over row, x10ea, 3# DB unilateral    15. standing hip ext, x20ea, over table    16. bridging, x60\"H (today only able to hold x15\"H), ball at calves    17. sidelying hip abd, to fatigue, x20 ea    18. LTR, x1'    19. DKTC, x1'    20. Nu step, x6', seat 18/19      Therapeutic Exercise Summary: 1. UPOC 6/15/25      Therapeutic Exercise Summary: HEP " "(performed in clinic)  Biceps isometrics wall 3x30\"H 50% effort  Pull downs 2x20 purpTB  Scap squeeze 2x10 5#  ER/abd OTB 2x10  Wand flexion x10  Wand extension x10    Bridges  Ltr  Sidelying hip abd    Therapeutic Treatments and Modalities:     1. Manual Therapy (CPT 85929), GHJ mobs GIII; PROM sh IR  Time-based treatments/modalities:  Physical Therapy Timed Treatment Charges  Manual therapy minutes (CPT 63976): 10 minutes  Therapeutic exercise minutes (CPT 64964): 35 minutes  ASSESSMENT: Patient has been seen for 1 months since starting PT for her R shoulder as well as her back. She has made slight progress in her ability to reach with less pain, imrpoved AROM, and less pain with MMT. She is still significantly decreased in her AROM, and some anterior shoulder pain with functional reaches. She is following up with her MD about this later this week and at this time wants to continue PT and not pursue more invasive interventions. Cont skilled PT   PLAN/RECOMMENDATIONS:   Plan for treatment: therapy treatment to continue next visit.  Planned interventions for next visit: continue with current treatment.         "

## 2025-06-17 ENCOUNTER — PHYSICAL THERAPY (OUTPATIENT)
Dept: PHYSICAL THERAPY | Facility: REHABILITATION | Age: 70
End: 2025-06-17
Attending: FAMILY MEDICINE
Payer: MEDICARE

## 2025-06-17 DIAGNOSIS — M12.811 ROTATOR CUFF ARTHROPATHY OF RIGHT SHOULDER: ICD-10-CM

## 2025-06-17 DIAGNOSIS — M43.16 SPONDYLOLISTHESIS OF LUMBAR REGION: ICD-10-CM

## 2025-06-17 DIAGNOSIS — M75.21 BICEPS TENDINITIS OF RIGHT UPPER EXTREMITY: Primary | ICD-10-CM

## 2025-06-17 PROCEDURE — 97110 THERAPEUTIC EXERCISES: CPT

## 2025-06-17 PROCEDURE — 97140 MANUAL THERAPY 1/> REGIONS: CPT

## 2025-06-17 NOTE — OP THERAPY PROGRESS SUMMARY
Outpatient Physical Therapy  PROGRESS SUMMARY NOTE      [facility] [place]  [address], [address]  [place] [address]  Phone:  [phone]    Date of Visit: 06/17/2025    Patient: Jodi Lopez  YOB: 1955  MRN: [id number]     Referring Provider: Larisa Eason M.D.  [address]  [place] [address]   Referring Diagnosis Bicipital tendinitis, right shoulder [M75.21];Other specific arthropathies, not elsewhere classified, right shoulder [M12.811];Spondylolisthesis, lumbar region [M43.16]         Rehab Potential: good    Progress Report Period: 5/15/25-6/17/25    Functional Assessment Used QuickDASH/RMQ        Objective Findings and Assessment:   Patient progression towards goals: Patient has been seen for 1 months since starting PT for her R shoulder as well as her back. She has made slight progress in her ability to reach with less pain, imrpoved AROM, and less pain with MMT. She is still significantly decreased in her AROM, and some anterior shoulder pain with functional reaches. She is following up with her MD about this later this week and at this time wants to continue PT and not pursue more invasive interventions. Cont skilled PT     Objective findings and assessment details: See daily note 6/17/25    Goals:   Short Term Goals:   -pt meets MCID for QUICKDASH  -pt reaches slightly behind back to lumbar spine in order to adjust belts/clothing with mild pain (75% MET)  -pt with ability to reach OH into cabinets with minor sxs at most to improve IADLs (75% MET)     -pt meets MCID for RMQ (eval score 50) - NT/NOT MET  -pt walks >15 min on avg with mild local LBP - MET  -pt without issues doing chores <5 min >90% of the time - MET  -pt with indep with HEP >50% of the time - MET      Short term goal time span:  2-4 weeks      Long Term Goals:      -pt meets MCID for QUICKDASH  -pt reaches slightly behind back to mid/high lumbar spine in order to adjust  belts/clothing with mild pain (NOT MET)  -pt with ability to reach OH into cabinets without sxs at most to improve IADLs (NOT MET)  -pt without pain reaching/nadira bicep region OH to improve ability to reach into cabinets with less pain/etc (NOT MET)  -pt drives without pain (NOT MET; 50% MET more tight than painful at this moment)     -pt meets MCID for RMQ - NT/NOT MET  -pt walks >20 min on avg with mild local LBP to improve travel - NOT MET  -pt without issues doing chores <30 min >90% of the time - NOT MET  -pt with indep with HEP >90% of the time - NOT MET  -pt meets MCID of 2-3 reps for 30 sec STS (eval 7 reps) MET      Long term goal time span:  6-8 weeks    Plan:   Planned therapy interventions:  E Stim Attended (CPT 25377), E Stim Unattended (CPT 04037), Hot or Cold Pack Therapy (CPT 96777), Manual Therapy (CPT 03998), Mechanical Traction (CPT 99350), Neuromuscular Re-education (CPT 06267), Therapeutic Exercise (CPT 51550) and Therapeutic Activities (CPT 83395)  Frequency:  2x week  Duration in weeks:  8  Duration in visits:  12      Referring provider co-signature:  I have reviewed this plan of care and my co-signature certifies the need for services.     Certification Period: 06/17/2025 to 08/19/25    Physician Signature: ________________________________ Date: ______________

## 2025-06-19 ENCOUNTER — APPOINTMENT (OUTPATIENT)
Dept: PHYSICAL THERAPY | Facility: REHABILITATION | Age: 70
End: 2025-06-19
Attending: FAMILY MEDICINE
Payer: MEDICARE

## 2025-06-19 NOTE — OP THERAPY DAILY TREATMENT
"Outpatient Physical Therapy  DAILY TREATMENT   [facility] Outpatient Physical Therapy [place] [address] [employer] [facility], [address]  [place] [address]  Phone:  [phone]  Date: 06/23/2025  Patient: Jodi Lopez  YOB: 1955  MRN: [id number]   Time Calculation  Start time: 0145  Stop time: 0230 Time Calculation (min): 45 minutes   Chief Complaint: No chief complaint on file.  Visit #: 21    SUBJECTIVE: Pt notes that her shoulder continues to bug her on and off. She notes that she still has trobule reaching. Having trouble waiting on a shot from MD.       Sxs: R sharp anterior shoulder/proximal bicep sxs; rare 1-2x/week sore/achy in dorsal forearm to wrist, denies N/T or neck pain     Aggs: stiff/pain HBB, occ reaching in front far, twisting arm, steering wheel    OBJECTIVE:    Current objective measures:   AROM shoulder  ABD 95 deg*  Flexion 120*  FIR glute; improves to L5/S1 LUE    L/S AROM  RSB/LSB slight sxs locally  All other AROM WFL  Slight sxs with bridge*      NT below:  Biceps* with reaching slihgt sxs here        Therapeutic Exercises (CPT 65612):     1. 1. UPOC 7/17/25, IR x15, GTB    2. Biceps isometrics wall, 70% effort 2x45\"H    3. rows/pull downs, 2x20ea, bigpurpTB    4. ER/abd pinkTB doubled, 2x10    5. scap squeezes, x10x5\"H, NT    6. bicep curl, 2x20ea 3/4# on RUE, 6#DB; 50% ROM on RUE only able d/t pain and weakness    7. scaption, sxs bicep mm; NT    8. wall slides, x10, sxs ant shoulder    9. sidelying shoulder flexion, x10, sxs ant shoulder    10. HBB stretch, x15    11. HAC at wall, x15, too painful so did HAC; NT    12. wand flexion, x15, supine;    13. wand extension, x10ea    14. bent over row, x10ea, 3# DB unilateral    15. standing hip ext, x20ea, over table; NT    16. bridging, x60\"H (today only able to hold x15\"H), ball at calves; NT    17. sidelying hip abd, to fatigue, x20 ea    18. LTR, x1', NT    19. DKTC, x1', NT    20. Nu step, x6', seat 18/19      Therapeutic Exercise " "Summary: 1. UPOC 6/15/25      Therapeutic Exercise Summary: HEP (performed in clinic)  Biceps isometrics wall 3x30\"H 50% effort  Pull downs 2x20 purpTB  Scap squeeze 2x10 5#  ER/abd OTB 2x10  Wand flexion x10  Wand extension x10    Bridges  Ltr  Sidelying hip abd    Therapeutic Treatments and Modalities:     1. Manual Therapy (CPT 21650), GHJ mobs GIII; PROM sh IR  Time-based treatments/modalities:  Physical Therapy Timed Treatment Charges  Manual therapy minutes (CPT 34692): 15 minutes  Therapeutic exercise minutes (CPT 00831): 25 minutes  ASSESSMENT: cont deficits in her overall strength and limited by anterior shoulder pain. This is preventing us from significantly increasing her strength exercises. Cont skilled PT care.     PLAN/RECOMMENDATIONS:   Plan for treatment: therapy treatment to continue next visit.  Planned interventions for next visit: continue with current treatment.         "

## 2025-06-23 ENCOUNTER — PHYSICAL THERAPY (OUTPATIENT)
Dept: PHYSICAL THERAPY | Facility: REHABILITATION | Age: 70
End: 2025-06-23
Attending: FAMILY MEDICINE
Payer: MEDICARE

## 2025-06-23 DIAGNOSIS — M43.16 SPONDYLOLISTHESIS OF LUMBAR REGION: ICD-10-CM

## 2025-06-23 DIAGNOSIS — M75.21 BICEPS TENDINITIS OF RIGHT UPPER EXTREMITY: Primary | ICD-10-CM

## 2025-06-23 DIAGNOSIS — M12.811 ROTATOR CUFF ARTHROPATHY OF RIGHT SHOULDER: ICD-10-CM

## 2025-06-23 PROCEDURE — 97110 THERAPEUTIC EXERCISES: CPT

## 2025-06-23 PROCEDURE — 97140 MANUAL THERAPY 1/> REGIONS: CPT

## 2025-06-24 ENCOUNTER — APPOINTMENT (OUTPATIENT)
Dept: MEDICAL GROUP | Facility: PHYSICIAN GROUP | Age: 70
End: 2025-06-24
Payer: MEDICARE

## 2025-06-25 ENCOUNTER — APPOINTMENT (OUTPATIENT)
Dept: PHYSICAL THERAPY | Facility: REHABILITATION | Age: 70
End: 2025-06-25
Attending: FAMILY MEDICINE
Payer: MEDICARE

## 2025-07-09 ENCOUNTER — PHYSICAL THERAPY (OUTPATIENT)
Dept: PHYSICAL THERAPY | Facility: REHABILITATION | Age: 70
End: 2025-07-09
Attending: FAMILY MEDICINE
Payer: MEDICARE

## 2025-07-09 DIAGNOSIS — M12.811 ROTATOR CUFF ARTHROPATHY OF RIGHT SHOULDER: ICD-10-CM

## 2025-07-09 DIAGNOSIS — M43.16 SPONDYLOLISTHESIS OF LUMBAR REGION: ICD-10-CM

## 2025-07-09 DIAGNOSIS — M75.21 BICEPS TENDINITIS OF RIGHT UPPER EXTREMITY: Primary | ICD-10-CM

## 2025-07-09 PROCEDURE — 97140 MANUAL THERAPY 1/> REGIONS: CPT

## 2025-07-09 PROCEDURE — 97110 THERAPEUTIC EXERCISES: CPT

## 2025-07-09 NOTE — OP THERAPY DAILY TREATMENT
"Outpatient Physical Therapy  DAILY TREATMENT   [facility] Outpatient Physical Therapy [place] [address] [employer] [facility], [address]  [place] [address]  Phone:  [phone]  Date: 07/09/2025  Patient: Jodi Lopez  YOB: 1955  MRN: [id number]   Time Calculation           Chief Complaint: No chief complaint on file.  Visit #: 11    SUBJECTIVE: Pt notes that overall she is doing okay. Her R shoulder still hurts with rotation and maybe gotten a little better.       Sxs: R sharp anterior shoulder/proximal bicep sxs; rare 1-2x/week sore/achy in dorsal forearm to wrist, denies N/T or neck pain     Aggs: stiff/pain HBB, occ reaching in front far, twisting arm, steering wheel    OBJECTIVE:    Current objective measures:   AROM shoulder  ABD 95 deg*  FIR L5/S1 RUE*      NT below:  Flexion 120*    L/S AROM  RSB/LSB slight sxs locally  All other AROM WFL  Slight sxs with bridge*      NT below:  Biceps* with reaching slihgt sxs here        Therapeutic Exercises (CPT 57913):     1. 1. UPOC 7/17/25, IR x15, GTB    2. Biceps isometrics wall, 70% effort 2x45\"H    3. rows/pull downs, 2x20ea, bigpurpTB    4. ER/abd pinkTB doubled, 2x10    5. scap squeezes, x10x5\"H, NT    6. bicep curl, 2x20ea 3/4# on RUE, 6# DB better but still 50% range; no pain 4#    7. scaption, sxs bicep mm; NT    8. wall slides, x10, sxs ant shoulder    9. sidelying shoulder flexion, x10, sxs ant shoulder    10. HBB stretch, x15, strap to pull across    11. HAC at wall, x15, too painful so did HAC; NT    12. wand flexion, x15, supine; NT    13. wand extension, x10ea    14. bent over row, x10ea, 4# DB unilateral    15. standing hip ext, x20ea, over table; NT    16. bridging, x60\"H (today only able to hold x15\"H), ball at calves; NT    17. sidelying hip abd, to fatigue, x20 ea;NT    18. LTR, x1', NT    19. DKTC, x1', NT    20. Nu step, x10', seat 18/19      Therapeutic Exercise Summary: 1. UPOC 6/15/25      Therapeutic Exercise Summary: HEP (performed in " "clinic)  Biceps isometrics wall 3x30\"H 50% effort  Pull downs 2x20 purpTB  Scap squeeze 2x10 5#  ER/abd OTB 2x10  Wand flexion x10  Wand extension x10    Bridges  Ltr  Sidelying hip abd    Therapeutic Treatments and Modalities:     1. Manual Therapy (CPT 14473), GHJ mobs GIII; PROM sh IR  Time-based treatments/modalities:     ASSESSMENT: ant shoulder pain continues with any load to front of the shoulder; slight improvement to ROM here measured today vs prior sessions. Cont to attempt to load the shoulder here as tolerated. She may receive an injection here next week.     PLAN/RECOMMENDATIONS:   Plan for treatment: therapy treatment to continue next visit.  Planned interventions for next visit: continue with current treatment.         "

## 2025-07-14 NOTE — OP THERAPY DAILY TREATMENT
"Outpatient Physical Therapy  DAILY TREATMENT   [facility] Outpatient Physical Therapy [place] [address] [employer] [facility], [address]  [place] [address]  Phone:  [phone]  Date: 07/15/2025  Patient: Jodi Lopez  YOB: 1955  MRN: [id number]   Time Calculation  Start time: 0317  Stop time: 0400 Time Calculation (min): 43 minutes   Chief Complaint: No chief complaint on file.  Visit #: 12    SUBJECTIVE: Pt notes a lot of pain this last Sunday going down the arm too. She will see an MD about her shoulder tomorrow.     Sxs: R sharp anterior shoulder/proximal bicep sxs; rare 1-2x/week sore/achy in dorsal forearm to wrist, denies N/T or neck pain     Aggs: stiff/pain HBB, occ reaching in front far, twisting arm, steering wheel    OBJECTIVE:    Current objective measures:   AROM shoulder  ABD 95 deg*  FIR L5 RUE*    NT below:  Flexion 120*    L/S AROM  RSB/LSB slight sxs locally  All other AROM WFL  Slight sxs with bridge*      NT below:  Biceps* with reaching slihgt sxs here        Therapeutic Exercises (CPT 69011):     1. 1. UPOC 7/17/25, IR x15, GTB    2. Biceps isometrics wall, 70% effort 2x45\"H    3. rows/pull downs, 2x20ea, bigpurpTB    4. ER/abd pinkTB doubled, 2x10    5. scap squeezes, x10x5\"H, NT    6. bicep curl, 2x20ea 3/4# on RUE, 6# DB better but still 50% range; no pain 4#    7. scaption, sxs bicep mm; NT    8. wall slides, x10, sxs ant shoulder    9. sidelying shoulder flexion, x10, sxs ant shoulder    10. HBB stretch, x15, strap to pull across    11. HAC at wall, x15, too painful so did HAC; NT    12. wand flexion, x15, supine; NT    13. wand extension, x10ea    14. bent over row, x10ea, 4# DB unilateral    15. standing hip ext, x20ea, over table; NT    16. bridging, x60\"H (today only able to hold x15\"H), ball at calves; NT    17. sidelying hip abd, to fatigue, x20 ea;NT    18. LTR, x1', NT    19. DKTC, x1', NT    20. Nu step, x10', seat 18/19 L4      Therapeutic Exercise Summary: 1. UPOC 6/15/25  " "    Therapeutic Exercise Summary: HEP (performed in clinic)  Biceps isometrics wall 3x30\"H 50% effort  Pull downs 2x20 purpTB  Scap squeeze 2x10 5#  ER/abd OTB 2x10  Wand flexion x10  Wand extension x10    Bridges  Ltr  Sidelying hip abd    Therapeutic Treatments and Modalities:     1. Manual Therapy (CPT 32288), GHJ mobs GIII; PROM sh IR  Time-based treatments/modalities:  Physical Therapy Timed Treatment Charges  Manual therapy minutes (CPT 01573): 10 minutes  Therapeutic exercise minutes (CPT 67814): 30 minutes  ASSESSMENT: cont pain with anterior load to the shoulder and stiffness with OH mobility and HBB. HBB reaching is improving slightly and she cont to tolerate her rehab. Goal to continue loading program for her anterior shoulder.   PLAN/RECOMMENDATIONS:   Plan for treatment: therapy treatment to continue next visit.  Planned interventions for next visit: continue with current treatment.         "

## 2025-07-15 ENCOUNTER — PHYSICAL THERAPY (OUTPATIENT)
Dept: PHYSICAL THERAPY | Facility: REHABILITATION | Age: 70
End: 2025-07-15
Attending: FAMILY MEDICINE
Payer: MEDICARE

## 2025-07-15 DIAGNOSIS — M43.16 SPONDYLOLISTHESIS OF LUMBAR REGION: ICD-10-CM

## 2025-07-15 DIAGNOSIS — M12.811 ROTATOR CUFF ARTHROPATHY OF RIGHT SHOULDER: ICD-10-CM

## 2025-07-15 DIAGNOSIS — M75.21 BICEPS TENDINITIS OF RIGHT UPPER EXTREMITY: Primary | ICD-10-CM

## 2025-07-15 PROCEDURE — 97110 THERAPEUTIC EXERCISES: CPT

## 2025-07-15 PROCEDURE — 97140 MANUAL THERAPY 1/> REGIONS: CPT

## 2025-07-22 NOTE — OP THERAPY DAILY TREATMENT
"Outpatient Physical Therapy  DAILY TREATMENT   [facility] Outpatient Physical Therapy [place] [address] [facility], [address]  [place] [address]  Phone:  [phone]  Date: 07/24/2025  Patient: Jodi Lopez  YOB: 1955  MRN: [id number]   Time Calculation  Start time: 0230  Stop time: 0315 Time Calculation (min): 45 minutes   Chief Complaint: No chief complaint on file.  Visit #: 13    SUBJECTIVE: Pt notes that she is continuing to have a lot of pain in the bicep on her RUE.      Sxs: R sharp anterior shoulder/proximal bicep sxs; rare 1-2x/week sore/achy in dorsal forearm to wrist, denies N/T or neck pain     Aggs: stiff/pain HBB, occ reaching in front far, twisting arm, steering wheel    OBJECTIVE:     Current objective measures:   AROM shoulder  ABD 95 deg*  FIR L5 RUE*  Flexion 127*    Not tested below:  L/S AROM  RSB/LSB slight sxs locally  All other AROM WFL  Slight sxs with bridge*    NT below:  Biceps* with reaching slihgt sxs here        Therapeutic Exercises (CPT 83963):     1. 1. UPOC 9/24/25, IR x15, GTB    2. Biceps isometrics wall, 70% effort 2x45\"H    3. rows/pull downs, 2x20ea, bigpurpTB    4. ER/abd pinkTB doubled, x10x5\"H    5. scap squeezes, x10x5\"H, NT    6. bicep curl, 2x20ea 3/4# on RUE, 6# DB better but still 50% range; no pain 4#    7. scaption, sxs bicep mm; NT    8. wall slides, x10, sxs ant shoulder    9. sidelying shoulder flexion, x10, sxs ant shoulder    10. HBB stretch, x15, strap to pull across    11. HAC at wall, x15, too painful so did HAC; NT    12. wand flexion, x15, supine; NT    13. wand extension, x10ea    14. bent over row, x10ea, 4# DB unilateral    15. standing hip ext, x20ea, over table; NT    16. bridging, x60\"H (today only able to hold x15\"H), ball at calves; NT    17. sidelying hip abd, to fatigue, x20 ea;NT    18. LTR, x1', NT    19. DKTC, x1', NT    20. Nu step, x10', seat 18/19 L4      Therapeutic Exercise Summary: 1. UPOC 6/15/25      Therapeutic Exercise " "Summary: HEP (performed in clinic)  Biceps isometrics wall 3x30\"H 50% effort  Pull downs 2x20 purpTB  Scap squeeze 2x10 5#  ER/abd OTB 2x10  Wand flexion x10  Wand extension x10    Bridges  Ltr  Sidelying hip abd    Therapeutic Treatments and Modalities:     1. Manual Therapy (CPT 00308), GHJ mobs GIII; PROM sh IR  Time-based treatments/modalities:  Physical Therapy Timed Treatment Charges  Manual therapy minutes (CPT 24164): 25 minutes  Therapeutic exercise minutes (CPT 01733): 20 minutes  ASSESSMENT: Pt has been seen for a couple of months since starting PT for her R shoulder in addition to her lower back. Lately we have shifted focus to her R shoulder which continues to have issues with AROM and MMT of her anterior shoulder or with ER movements. She has continued deficits with reaching in addition to twisting at her arm. We have been able to progress her load of the shoulder somehwat and I would like to attempt to do this in order to address her overall ability to tolerate load to the front of her shoulder. I also recommend she f/u with her PCP in regards to other Tx options especially if she doesn't do well with PT over the next couple of months.    PLAN/RECOMMENDATIONS:   Plan for treatment: therapy treatment to continue next visit.  Planned interventions for next visit: continue with current treatment.         "

## 2025-07-24 ENCOUNTER — PHYSICAL THERAPY (OUTPATIENT)
Dept: PHYSICAL THERAPY | Facility: REHABILITATION | Age: 70
End: 2025-07-24
Attending: FAMILY MEDICINE
Payer: MEDICARE

## 2025-07-24 DIAGNOSIS — M75.21 BICEPS TENDINITIS OF RIGHT UPPER EXTREMITY: Primary | ICD-10-CM

## 2025-07-24 DIAGNOSIS — M43.16 SPONDYLOLISTHESIS OF LUMBAR REGION: ICD-10-CM

## 2025-07-24 DIAGNOSIS — M12.811 ROTATOR CUFF ARTHROPATHY OF RIGHT SHOULDER: ICD-10-CM

## 2025-07-24 PROCEDURE — 97110 THERAPEUTIC EXERCISES: CPT

## 2025-07-24 PROCEDURE — 97140 MANUAL THERAPY 1/> REGIONS: CPT

## 2025-07-24 NOTE — OP THERAPY PROGRESS SUMMARY
Outpatient Physical Therapy  PROGRESS SUMMARY NOTE      [facility] [place]  [address], [address]  [place] [address]  Phone:  [phone]    Date of Visit: 07/24/2025    Patient: Jodi Lopez  YOB: 1955  MRN: [id number]     Referring Provider: Larisa Eason M.D.  [address]  [place] [address]   Referring Diagnosis Bicipital tendinitis, right shoulder [M75.21];Other specific arthropathies, not elsewhere classified, right shoulder [M12.811];Spondylolisthesis, lumbar region [M43.16]     Visit Diagnoses     ICD-10-CM   1. Biceps tendinitis of right upper extremity  M75.21   2. Spondylolisthesis of lumbar region  M43.16   3. Rotator cuff arthropathy of right shoulder  M12.811       Rehab Potential: fair    Progress Report Period: 6/17-7/24/25    Functional Assessment Used [facility]          Objective Findings and Assessment:   Patient progression towards goals:  Pt has been seen for a couple of months since starting PT for her R shoulder in addition to her lower back. Lately we have shifted focus to her R shoulder which continues to have issues with AROM and MMT of her anterior shoulder or with ER movements. She has continued deficits with reaching in addition to twisting at her arm. We have been able to progress her load of the shoulder somehwat and I would like to attempt to do this in order to address her overall ability to tolerate load to the front of her shoulder. I also recommend she f/u with her PCP in regards to other Tx options especially if she doesn't do well with PT over the next couple of months.    Objective findings and assessment details: See daily note 7/24/25    Goals:   Short Term Goals:   Short Term Goals:   -pt meets MCID for QUICKDASH (NOT MET)  -pt reaches slightly behind back to lumbar spine in order to adjust belts/clothing with mild pain (MET)  -pt with ability to reach OH into cabinets with minor sxs at most to improve IADLs  (75% MET)     -pt meets MCID for RMQ (eval score 50) - NT/NOT MET  -pt walks >15 min on avg with mild local LBP - MET  -pt without issues doing chores <5 min >90% of the time - MET  -pt with indep with HEP >50% of the time - MET            Short term goal time span:  2-4 weeks      Long Term Goals:      -pt meets MCID for RMQ - NT/NOT MET  -pt walks >20 min on avg with mild local LBP to improve travel - NOT MET  -pt without issues doing chores <30 min >90% of the time - NOT MET  -pt with indep with HEP >90% of the time - NOT MET  -pt meets MCID of 2-3 reps for 30 sec STS (eval 7 reps) MET      -pt meets MCID for QUICKDASH (NOT MET)  -pt reaches slightly behind back to mid/high lumbar spine in order to adjust belts/clothing with mild pain (NOT MET)  -pt with ability to reach OH into cabinets without sxs at most to improve IADLs (NOT MET)  -pt without pain reaching/nadira bicep region OH to improve ability to reach into cabinets with less pain/etc (NOT MET)  -pt drives without pain (NOT MET; 50% MET more tight than painful at this moment)        Long term goal time span:  6-8 weeks    Plan:   Planned therapy interventions:  E Stim Attended (CPT 92604), E Stim Unattended (CPT 24509), Hot or Cold Pack Therapy (CPT 34662), Manual Therapy (CPT 84227), Neuromuscular Re-education (CPT 06113), Therapeutic Activities (CPT 90729) and Therapeutic Exercise (CPT 10071)  Frequency:  1x week  Duration in weeks:  8  Duration in visits:  6      Referring provider co-signature:  I have reviewed this plan of care and my co-signature certifies the need for services.     Certification Period: 07/24/2025 to 09/25/25    Physician Signature: ________________________________ Date: ______________

## 2025-08-01 ENCOUNTER — PHYSICAL THERAPY (OUTPATIENT)
Dept: PHYSICAL THERAPY | Facility: REHABILITATION | Age: 70
End: 2025-08-01
Attending: FAMILY MEDICINE
Payer: MEDICARE

## 2025-08-01 DIAGNOSIS — M43.16 SPONDYLOLISTHESIS OF LUMBAR REGION: ICD-10-CM

## 2025-08-01 DIAGNOSIS — M12.811 ROTATOR CUFF ARTHROPATHY OF RIGHT SHOULDER: ICD-10-CM

## 2025-08-01 DIAGNOSIS — M75.21 BICEPS TENDINITIS OF RIGHT UPPER EXTREMITY: Primary | ICD-10-CM

## 2025-08-01 PROCEDURE — 97140 MANUAL THERAPY 1/> REGIONS: CPT

## 2025-08-01 PROCEDURE — 97110 THERAPEUTIC EXERCISES: CPT

## 2025-08-12 ENCOUNTER — APPOINTMENT (OUTPATIENT)
Dept: MEDICAL GROUP | Facility: PHYSICIAN GROUP | Age: 70
End: 2025-08-12
Payer: MEDICARE

## 2025-08-12 PROBLEM — E55.9 VITAMIN D DEFICIENCY: Status: ACTIVE | Noted: 2025-08-12

## 2025-08-12 PROBLEM — M54.50 LOW BACK PAIN, UNSPECIFIED: Status: ACTIVE | Noted: 2025-08-12

## 2025-08-12 PROBLEM — M05.9 RHEUMATOID ARTHRITIS WITH POSITIVE RHEUMATOID FACTOR (HCC): Status: ACTIVE | Noted: 2025-08-12

## 2025-08-12 PROBLEM — M79.10 MUSCLE PAIN: Status: ACTIVE | Noted: 2025-08-12

## 2025-08-12 PROBLEM — N28.1 ACQUIRED CYSTIC KIDNEY DISEASE: Status: ACTIVE | Noted: 2025-08-12

## 2025-08-12 PROBLEM — E78.2 MIXED HYPERLIPIDEMIA: Status: ACTIVE | Noted: 2025-08-12

## 2025-08-12 PROBLEM — N95.8 OTHER SPECIFIED MENOPAUSAL AND PERIMENOPAUSAL DISORDERS: Status: ACTIVE | Noted: 2025-08-12

## 2025-08-12 PROBLEM — Z79.890 POSTMENOPAUSAL HRT (HORMONE REPLACEMENT THERAPY): Status: ACTIVE | Noted: 2025-08-12

## 2025-08-12 PROBLEM — K21.9 GASTROESOPHAGEAL REFLUX DISEASE WITHOUT ESOPHAGITIS: Status: ACTIVE | Noted: 2025-08-12

## 2025-08-12 PROBLEM — M48.061 SPINAL STENOSIS, LUMBAR REGION WITHOUT NEUROGENIC CLAUDICATION: Status: ACTIVE | Noted: 2025-08-12

## 2025-08-12 PROBLEM — F33.0 MAJOR DEPRESSIVE DISORDER, RECURRENT, MILD (HCC): Status: ACTIVE | Noted: 2025-08-12

## 2025-08-12 PROBLEM — G89.29 CHRONIC PAIN: Status: ACTIVE | Noted: 2025-08-12

## 2025-08-12 PROBLEM — M54.16 LUMBAR RADICULOPATHY: Status: ACTIVE | Noted: 2023-01-13

## 2025-08-12 ASSESSMENT — FIBROSIS 4 INDEX: FIB4 SCORE: 1.36

## 2025-08-14 ENCOUNTER — PHYSICAL THERAPY (OUTPATIENT)
Dept: PHYSICAL THERAPY | Facility: REHABILITATION | Age: 70
End: 2025-08-14
Attending: FAMILY MEDICINE
Payer: MEDICARE

## 2025-08-14 DIAGNOSIS — M43.16 SPONDYLOLISTHESIS OF LUMBAR REGION: ICD-10-CM

## 2025-08-14 DIAGNOSIS — M12.811 ROTATOR CUFF ARTHROPATHY OF RIGHT SHOULDER: ICD-10-CM

## 2025-08-14 DIAGNOSIS — M75.21 BICEPS TENDINITIS OF RIGHT UPPER EXTREMITY: Primary | ICD-10-CM

## 2025-08-14 PROCEDURE — 97110 THERAPEUTIC EXERCISES: CPT

## 2025-08-14 PROCEDURE — 97140 MANUAL THERAPY 1/> REGIONS: CPT

## (undated) DEVICE — PROTECTOR ULNA NERVE - (36PR/CA)

## (undated) DEVICE — BANDAGE ELASTIC 6 HONEYCOMB - 6X5YD LF (20/CA)"

## (undated) DEVICE — LACTATED RINGERS INJ 1000 ML - (14EA/CA 60CA/PF)

## (undated) DEVICE — CANISTER SUCTION RIGID RED 1500CC (40EA/CA)

## (undated) DEVICE — GOWN WARMING STANDARD FLEX - (30/CA)

## (undated) DEVICE — SUTURE 2.0MM TAPE VIOLET MUST ORDER 12 EACH AT A TIME

## (undated) DEVICE — SUTURE GENERAL

## (undated) DEVICE — WRAP COBAN SELF-ADHERENT 6 IN X  5YDS STERILE TAN (12/CA)

## (undated) DEVICE — GLOVE BIOGEL SZ 7.5 SURGICAL PF LTX - (50PR/BX 4BX/CA)

## (undated) DEVICE — SENSOR SPO2 NEO LNCS ADHESIVE (20/BX) SEE USER NOTES

## (undated) DEVICE — SODIUM CHL. IRRIGATION 0.9% 3000ML (4EA/CA 65CA/PF)

## (undated) DEVICE — KIT ANESTHESIA W/CIRCUIT & 3/LT BAG W/FILTER (20EA/CA)

## (undated) DEVICE — GLOVE BIOGEL ECLIPSE  PF LATEX SIZE 6.5 (50PR/BX)

## (undated) DEVICE — ELECTRODE DUAL RETURN W/ CORD - (50/PK)

## (undated) DEVICE — ELECTRODE 850 FOAM ADHESIVE - HYDROGEL RADIOTRNSPRNT (50/PK)

## (undated) DEVICE — SHAVER, 5.5 RESECTOR

## (undated) DEVICE — SET EXTENSION WITH 2 PORTS (48EA/CA) ***PART #2C8610 IS A SUBSTITUTE*****

## (undated) DEVICE — CHLORAPREP 26 ML APPLICATOR - ORANGE TINT(25/CA)

## (undated) DEVICE — DRAPE C-ARM LARGE 41IN X 74 IN - (10/BX 2BX/CA)

## (undated) DEVICE — STOCKINETTE, TUBULAR 4 COTTON

## (undated) DEVICE — GLOVE BIOGEL INDICATOR SZ 8 SURGICAL PF LTX - (50/BX 4BX/CA)

## (undated) DEVICE — MASK ANESTHESIA ADULT  - (100/CA)

## (undated) DEVICE — PENCIL ELECTSURG 10FT BTN SWH - (50/CA)

## (undated) DEVICE — BOVIE BLADE COATED - (50/PK)

## (undated) DEVICE — COVER LIGHT HANDLE FLEXIBLE - SOFT (2EA/PK 80PK/CA)

## (undated) DEVICE — DRAPE LARGE 3 QUARTER - (20/CA)

## (undated) DEVICE — PACK LOWER EXTREMITY - (2/CA)

## (undated) DEVICE — DRAPE ARTHROSCOPE (ACL) - (10/CA)

## (undated) DEVICE — CANNULA KIT UNIV GREY - (10/BX)

## (undated) DEVICE — SUTURE 4-0 ETHILON FS-2 18 (36PK/BX)"

## (undated) DEVICE — BAG SPONGE COUNT 10.25 X 32 - BLUE (250/CA)

## (undated) DEVICE — SUCTION INSTRUMENT YANKAUER BULBOUS TIP W/O VENT (50EA/CA)

## (undated) DEVICE — CANISTER SUCTION 3000ML MECHANICAL FILTER AUTO SHUTOFF MEDI-VAC NONSTERILE LF DISP  (40EA/CA)

## (undated) DEVICE — GLOVE BIOGEL ECLIPSE PF LATEX SIZE 9.0

## (undated) DEVICE — TOWELS CLOTH SURGICAL - (4/PK 20PK/CA)

## (undated) DEVICE — HEAD HOLDER JUNIOR/ADULT

## (undated) DEVICE — SUTURE 4-0 PROLENE PS-2 18 (36PK/BX)"

## (undated) DEVICE — TUBING DAY USE W/CARTRIDGE (10EA/BX)

## (undated) DEVICE — TUBING CLEARLINK DUO-VENT - C-FLO (48EA/CA)

## (undated) DEVICE — DETERGENT RENUZYME PLUS 10 OZ PACKET (50/BX)

## (undated) DEVICE — TAPE XBRAID TT 2.0MM (12EA/BX)

## (undated) DEVICE — PADDING CAST 4 IN STERILE - 4 X 4 YDS (24/CA)

## (undated) DEVICE — SET LEADWIRE 5 LEAD BEDSIDE DISPOSABLE ECG (1SET OF 5/EA)

## (undated) DEVICE — HUMID-VENT HEAT AND MOISTURE EXCHANGE- (50/BX)

## (undated) DEVICE — GLOVE BIOGEL SZ 8 SURGICAL PF LTX - (50PR/BX 4BX/CA)

## (undated) DEVICE — SUTURE 4-0 ETHILON FS-1 18 (36PK/BX)"

## (undated) DEVICE — SLING STABLE MEDIUM

## (undated) DEVICE — SUTURE 2-0 MONOCRYL CT-1

## (undated) DEVICE — GLOVE BIOGEL PI ORTHO SZ 8 PF LF (40PR/BX)

## (undated) DEVICE — SUTURE PASSER SELF CAPTURE

## (undated) DEVICE — TUBING PATIENT W/CONNECTOR REDEUCE (1EA)

## (undated) DEVICE — PADDING CAST 6 IN STERILE - 6 X 4 YDS (24/CA)

## (undated) DEVICE — GLOVE BIOGEL SZ 6.5 SURGICAL PF LTX (50PR/BX 4BX/CA)

## (undated) DEVICE — ARTHROWAND TURBOVAC 3.5/90 SCT

## (undated) DEVICE — DRAPE STRLE REG TOWEL 18X24 - (10/BX 4BX/CA)"

## (undated) DEVICE — PACK ARTHROSCOPY - (2EA//CA)

## (undated) DEVICE — CONNECTOR HUBLESS DRAINAGE - ONE WAY (20/BX)

## (undated) DEVICE — SHAVER4.0 AGGRESSIVE + FORMLA (5EA/BX)

## (undated) DEVICE — SUTURE 3-0 ETHILON FS-1 - (36/BX) 30 INCH

## (undated) DEVICE — TUBING PUMP WITH CONNECTOR REDEUCE (1EA)

## (undated) DEVICE — SODIUM CHL IRRIGATION 0.9% 1000ML (12EA/CA)

## (undated) DEVICE — TUBE CONNECTING SUCTION - CLEAR PLASTIC STERILE 72 IN (50EA/CA)

## (undated) DEVICE — DRAPETIBURON SHOULDER W/POUCH - (5EA/CA)

## (undated) DEVICE — GLOVE BIOGEL PI INDICATOR SZ 6.5 SURGICAL PF LF - (50/BX 4BX/CA)

## (undated) DEVICE — SPIDER SHOULDER HOLDER (12EA/BX)

## (undated) DEVICE — TOWEL STOP TIMEOUT SAFETY FLAG (40EA/CA)

## (undated) DEVICE — NEPTUNE 4 PORT MANIFOLD - (20/PK)

## (undated) DEVICE — PACK SHOULDER ARTHROSCOPY SM - (2EA/CA)

## (undated) DEVICE — WATER IRRIGATION STERILE 1000ML (12EA/CA)

## (undated) DEVICE — STOCKINET BIAS 4 IN STERILE - (20/CA)

## (undated) DEVICE — TUBING CASSETTE CROSSFLOW INTEGRATED (10EA/CA)

## (undated) DEVICE — WATER IRRIG. STER. 1500 ML - (9/CA)

## (undated) DEVICE — KIT ROOM DECONTAMINATION

## (undated) DEVICE — GLOVE BIOGEL PI ORTHO SZ 8.5 PF LF (40/BX)

## (undated) DEVICE — SENSOR OXIMETER ADULT SPO2 RD SET (20EA/BX)